# Patient Record
Sex: MALE | Race: WHITE | Employment: OTHER | URBAN - METROPOLITAN AREA
[De-identification: names, ages, dates, MRNs, and addresses within clinical notes are randomized per-mention and may not be internally consistent; named-entity substitution may affect disease eponyms.]

---

## 2017-03-08 ENCOUNTER — OFFICE VISIT (OUTPATIENT)
Dept: FAMILY MEDICINE | Facility: CLINIC | Age: 67
End: 2017-03-08
Payer: COMMERCIAL

## 2017-03-08 ENCOUNTER — TRANSFERRED RECORDS (OUTPATIENT)
Dept: HEALTH INFORMATION MANAGEMENT | Facility: CLINIC | Age: 67
End: 2017-03-08

## 2017-03-08 VITALS
BODY MASS INDEX: 28.01 KG/M2 | DIASTOLIC BLOOD PRESSURE: 70 MMHG | SYSTOLIC BLOOD PRESSURE: 124 MMHG | WEIGHT: 189.1 LBS | TEMPERATURE: 98.4 F | HEART RATE: 76 BPM | HEIGHT: 69 IN

## 2017-03-08 DIAGNOSIS — L08.9 RIGHT KNEE SKIN INFECTION: Primary | ICD-10-CM

## 2017-03-08 DIAGNOSIS — Z96.651 STATUS POST TOTAL RIGHT KNEE REPLACEMENT: ICD-10-CM

## 2017-03-08 DIAGNOSIS — D50.9 IRON DEFICIENCY ANEMIA, UNSPECIFIED IRON DEFICIENCY ANEMIA TYPE: ICD-10-CM

## 2017-03-08 LAB
BASOPHILS # BLD AUTO: 0 10E9/L (ref 0–0.2)
BASOPHILS NFR BLD AUTO: 0.2 %
DIFFERENTIAL METHOD BLD: ABNORMAL
EOSINOPHIL # BLD AUTO: 0.1 10E9/L (ref 0–0.7)
EOSINOPHIL NFR BLD AUTO: 0.8 %
ERYTHROCYTE [DISTWIDTH] IN BLOOD BY AUTOMATED COUNT: 14.2 % (ref 10–15)
ERYTHROCYTE [SEDIMENTATION RATE] IN BLOOD BY WESTERGREN METHOD: 56 MM/H (ref 0–20)
HCT VFR BLD AUTO: 33.3 % (ref 40–53)
HGB BLD-MCNC: 10.6 G/DL (ref 13.3–17.7)
IMM GRANULOCYTES # BLD: 0 10E9/L (ref 0–0.4)
IMM GRANULOCYTES NFR BLD: 0.1 %
LYMPHOCYTES # BLD AUTO: 1.9 10E9/L (ref 0.8–5.3)
LYMPHOCYTES NFR BLD AUTO: 18.4 %
MCH RBC QN AUTO: 29.4 PG (ref 26.5–33)
MCHC RBC AUTO-ENTMCNC: 31.8 G/DL (ref 31.5–36.5)
MCV RBC AUTO: 92 FL (ref 78–100)
MONOCYTES # BLD AUTO: 1.4 10E9/L (ref 0–1.3)
MONOCYTES NFR BLD AUTO: 14 %
NEUTROPHILS # BLD AUTO: 6.8 10E9/L (ref 1.6–8.3)
NEUTROPHILS NFR BLD AUTO: 66.5 %
PLATELET # BLD AUTO: 337 10E9/L (ref 150–450)
RBC # BLD AUTO: 3.61 10E12/L (ref 4.4–5.9)
WBC # BLD AUTO: 10.2 10E9/L (ref 4–11)

## 2017-03-08 PROCEDURE — 99214 OFFICE O/P EST MOD 30 MIN: CPT | Performed by: PHYSICIAN ASSISTANT

## 2017-03-08 PROCEDURE — 85652 RBC SED RATE AUTOMATED: CPT | Performed by: PHYSICIAN ASSISTANT

## 2017-03-08 PROCEDURE — 85025 COMPLETE CBC W/AUTO DIFF WBC: CPT | Performed by: PHYSICIAN ASSISTANT

## 2017-03-08 PROCEDURE — 36415 COLL VENOUS BLD VENIPUNCTURE: CPT | Performed by: PHYSICIAN ASSISTANT

## 2017-03-08 RX ORDER — AMOXICILLIN 250 MG
1 CAPSULE ORAL
COMMUNITY
Start: 2017-02-09 | End: 2018-03-27

## 2017-03-08 RX ORDER — HYDROXYZINE PAMOATE 25 MG/1
25 CAPSULE ORAL
COMMUNITY
Start: 2017-01-11 | End: 2019-07-11

## 2017-03-08 ASSESSMENT — ANXIETY QUESTIONNAIRES
3. WORRYING TOO MUCH ABOUT DIFFERENT THINGS: NOT AT ALL
5. BEING SO RESTLESS THAT IT IS HARD TO SIT STILL: NOT AT ALL
GAD7 TOTAL SCORE: 0
2. NOT BEING ABLE TO STOP OR CONTROL WORRYING: NOT AT ALL
7. FEELING AFRAID AS IF SOMETHING AWFUL MIGHT HAPPEN: NOT AT ALL
6. BECOMING EASILY ANNOYED OR IRRITABLE: NOT AT ALL
1. FEELING NERVOUS, ANXIOUS, OR ON EDGE: NOT AT ALL

## 2017-03-08 ASSESSMENT — PATIENT HEALTH QUESTIONNAIRE - PHQ9: 5. POOR APPETITE OR OVEREATING: NOT AT ALL

## 2017-03-08 NOTE — MR AVS SNAPSHOT
"              After Visit Summary   3/8/2017    Juan Lindsay    MRN: [id number]           Patient Information     Date Of Birth          1950        Visit Information        Provider Department      3/8/2017 11:40 AM Nguyen Lincoln PA-C [facility]        Today's Diagnoses     Right knee skin infection    -  1    Status post total right knee replacement           Follow-ups after your visit        Who to contact     Normal or non-critical lab and imaging results will be communicated to you by [email or web address], letter or phone within 4 business days after the clinic has received the results. If you do not hear from us within 7 days, please contact the clinic through [email or web address] or phone. If you have a critical or abnormal lab result, we will notify you by phone as soon as possible.  Submit refill requests through Consumr or call your pharmacy and they will forward the refill request to us. Please allow 3 business days for your refill to be completed.          If you need to speak with a  for additional information , please call: [phone]             Additional Information About Your Visit        [email or web address] Information     Consumr gives you secure access to your electronic health record. If you see a primary care provider, you can also send messages to your care team and make appointments. If you have questions, please call your primary care clinic.  If you do not have a primary care provider, please call [phone] and they will assist you.        Care EveryWhere ID     This is your Care EveryWhere ID. This could be used by other organizations to access your [facility] medical records  [id number]        Your Vitals Were     Pulse Temperature Height BMI (Body Mass Index)          76 98.4  F (36.9  C) (Tympanic) 5' 9.25\" (1.759 m) 27.72 kg/m2         Blood Pressure from Last 3 Encounters:   03/08/17 124/70   10/20/16 138/82   07/27/16 120/68    Weight from Last 3 Encounters:   03/08/17 189 lb " 1.6 oz (85.8 kg)   10/20/16 184 lb (83.5 kg)   07/27/16 197 lb 8 oz (89.6 kg)              We Performed the Following     CBC with platelets differential     Erythrocyte sedimentation rate auto          Today's Medication Changes          These changes are accurate as of: 3/8/17 11:59 PM.  If you have any questions, ask your nurse or doctor.               These medicines have changed or have updated prescriptions.        Dose/Directions    escitalopram 20 MG tablet   Commonly known as:  LEXAPRO   This may have changed:  how much to take   Used for:  Anxiety state        Dose:  20 mg   Take 1 tablet (20 mg) by mouth daily   Quantity:  30 tablet   Refills:  0                Primary Care Provider Office Phone # Fax #    Radha Vivar [phone]       [facility] [address] [facility] [address]        Thank you!     Thank you for choosing [facility]  for your care. Our goal is always to provide you with excellent care. Hearing back from our patients is one way we can continue to improve our services. Please take a few minutes to complete the written survey that you may receive in the mail after your visit with us. Thank you!             Your Updated Medication List - Protect others around you: Learn how to safely use, store and throw away your medicines at www.disposemymeds.org.          This list is accurate as of: 3/8/17 11:59 PM.  Always use your most recent med list.                   Brand Name Dispense Instructions for use    escitalopram 20 MG tablet    LEXAPRO    30 tablet    Take 1 tablet (20 mg) by mouth daily       fluticasone 50 MCG/ACT spray    FLONASE    3 Bottle    Spray 1-2 sprays into both nostrils daily       gabapentin 400 MG capsule    NEURONTIN     Take 1 capsule by mouth 3 times daily       hydrOXYzine 25 MG capsule    VISTARIL     Take 25 mg by mouth       MS CONTIN 15 MG 12 hr tablet   Generic drug:  morphine      Take 2 tablets (30 mg) by mouth 4  times daily       NORCO  MG per tablet   Generic drug:  HYDROcodone-acetaminophen      Take 2 tablets by mouth 3 times daily.       SENIOR MENS FORMULA OR      Take 1 tablet by mouth daily.       senna-docusate 8.6-50 MG per tablet    SENOKOT-S;PERICOLACE     Take 1 tablet by mouth       VITAMIN B12 PO      Take 1 tablet by mouth daily

## 2017-03-08 NOTE — NURSING NOTE
"Chief Complaint   Patient presents with     Knee Pain       Initial /75 (BP Location: Right arm, Patient Position: Chair, Cuff Size: Adult Regular)  Pulse 76  Temp 98.4  F (36.9  C) (Tympanic)  Ht 5' 9.25\" (1.759 m)  Wt 189 lb 1.6 oz (85.8 kg)  BMI 27.72 kg/m2 Estimated body mass index is 27.72 kg/(m^2) as calculated from the following:    Height as of this encounter: 5' 9.25\" (1.759 m).    Weight as of this encounter: 189 lb 1.6 oz (85.8 kg).  Medication Reconciliation: complete   Kathy Mcgill CMA  "

## 2017-03-08 NOTE — LETTER
[facility]  [address] [place] [id number]  [phone]        February 26, 2018    Juan Lindsay  [id number] [facility] [address]              Dear Juan Lindsay    This is to remind you that your non fasting lab is due.    You may call our office at [phone] to schedule an appointment.    Please disregard this notice if you have already had your labs drawn or made an appointment.        Sincerely,        Nguyen Lincoln PA-C

## 2017-03-09 ASSESSMENT — ANXIETY QUESTIONNAIRES: GAD7 TOTAL SCORE: 0

## 2017-03-09 ASSESSMENT — PATIENT HEALTH QUESTIONNAIRE - PHQ9: SUM OF ALL RESPONSES TO PHQ QUESTIONS 1-9: 1

## 2017-03-11 ENCOUNTER — TRANSFERRED RECORDS (OUTPATIENT)
Dept: HEALTH INFORMATION MANAGEMENT | Facility: CLINIC | Age: 67
End: 2017-03-11

## 2017-03-15 ENCOUNTER — TELEPHONE (OUTPATIENT)
Dept: FAMILY MEDICINE | Facility: CLINIC | Age: 67
End: 2017-03-15

## 2017-03-15 NOTE — TELEPHONE ENCOUNTER
DIANA Mendoza: He sees the surgeon on 3/ Abram said he has not been taking extra iron the past 2 months.  He said that we will start taking it again. He said that he saw the Orthopedist and was admitted to the hospital and on  3/16/17 they cleaned it all out. The infection went into my system and so I have PICC line and I have to self give IV antibiotic- cefazolin 3 times a day. Also taking rifampin. Feeling better now and the PICC line is going well. He sees the surgeon on 3/17/17. He said that the homecare nurse visits too to check on the PICC line.  Saúl Burch RN

## 2017-03-15 NOTE — TELEPHONE ENCOUNTER
Patient Result Comments   Entered by Nguyen Lincoln PA-C at 3/10/2017  6:38 PM   Red Valverde,   Your hemoglobin is low. Are you still taking iron?   How did the ortho appointment go? Did you have a joint infection?   Kelly Lincoln PA-C

## 2017-03-15 NOTE — TELEPHONE ENCOUNTER
Message left on answering machine to call the [facility] RN back.  Saúl Burch RN

## 2017-03-17 ENCOUNTER — TRANSFERRED RECORDS (OUTPATIENT)
Dept: HEALTH INFORMATION MANAGEMENT | Facility: CLINIC | Age: 67
End: 2017-03-17

## 2017-03-22 ENCOUNTER — MEDICAL CORRESPONDENCE (OUTPATIENT)
Dept: HEALTH INFORMATION MANAGEMENT | Facility: CLINIC | Age: 67
End: 2017-03-22

## 2017-04-06 ENCOUNTER — TRANSFERRED RECORDS (OUTPATIENT)
Dept: HEALTH INFORMATION MANAGEMENT | Facility: CLINIC | Age: 67
End: 2017-04-06

## 2017-04-07 ENCOUNTER — TELEPHONE (OUTPATIENT)
Dept: FAMILY MEDICINE | Facility: CLINIC | Age: 67
End: 2017-04-07

## 2017-04-07 NOTE — TELEPHONE ENCOUNTER
Nano from [facility] calling to state that they are discharging Juan.  They were seeing him for infusion through his pic line. [facility] had contacted [facility] as they thought they would be helping him with his future infusions, but they will not be.  She is wondering what the options are for his future treatments?  They needed to discharge him as he is no longer home bound.  Please review and advise.  Thank you..Toya Hoyos

## 2017-04-07 NOTE — TELEPHONE ENCOUNTER
He has never followed up in this office and we are not managing this antibiotic treatment. I think his surgeon has been so I don't know what they are planning on doing. Maybe they should be asked. Radha Vivar M.D.

## 2017-04-07 NOTE — TELEPHONE ENCOUNTER
I jus tfound a consult from Dr. Tramaine fair in [place] about him he needs ivabx until 4/18. He will need to come in for the infusions somewhere I guess. Radha Vivar M.D.

## 2017-04-10 NOTE — TELEPHONE ENCOUNTER
So, do I tell Abram to contact Dr. Redd for the orders and have Abram call the Infusion Clinic?  Saúl Burch RN

## 2017-04-11 NOTE — TELEPHONE ENCOUNTER
I think Dr. Redd has been ordering the iv antibiotics he also need s labs that have not been being done. It is either his offic or orthopedics . I have not been doing anything. Radha Vivar M.D.;

## 2017-04-12 NOTE — TELEPHONE ENCOUNTER
Called patient and told him Dr. Vivar reccomends contacting Dr. Redd. Patient then stated that he saw Tramaine last week and gave him instructions to stop the infusion on 4-18 and then start oral antibiotic medications but patient's current  home care is discharging him from IV therapy as he is not home bound now. Patient then states that he only needs the PICC re-taped and then removed on 4-18 as patient is doing his own infusions 3 times per day. Patient was told by the [facility] to seek a [facility] to have this last step done. Told the patient this nurse would contact [facility] and get information as to how to proceed. Contacted Radha at [facility] who informs this nurse that due to insurance the patient will need to have this done at an infusion clinic as not able to be done in the home. Radha gave this nurse 2 contacts: [phone] in [place] or [phone] in [place]. Called patient back and patient informed this nurse that [facility] put in the PICC and he could contact their clinic to see if he can have the last steps finished through [facility]. Told patient that this seems like the logical route. Asked patient to call the clinic back to follow up that this taken care of or if patient wishes to switch back to [facility], can go from there.  Dot

## 2017-04-19 ENCOUNTER — MEDICAL CORRESPONDENCE (OUTPATIENT)
Dept: HEALTH INFORMATION MANAGEMENT | Facility: CLINIC | Age: 67
End: 2017-04-19

## 2017-04-26 ENCOUNTER — OFFICE VISIT (OUTPATIENT)
Dept: FAMILY MEDICINE | Facility: CLINIC | Age: 67
End: 2017-04-26
Payer: COMMERCIAL

## 2017-04-26 VITALS
WEIGHT: 174.2 LBS | HEIGHT: 69 IN | DIASTOLIC BLOOD PRESSURE: 74 MMHG | BODY MASS INDEX: 25.8 KG/M2 | HEART RATE: 72 BPM | SYSTOLIC BLOOD PRESSURE: 132 MMHG | TEMPERATURE: 97.7 F

## 2017-04-26 DIAGNOSIS — M25.50 MULTIPLE JOINT PAIN: ICD-10-CM
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
## 2017-04-26 DIAGNOSIS — Z96.659 INFECTION OF PROSTHETIC KNEE JOINT, SEQUELA: ICD-10-CM

## 2017-04-26 DIAGNOSIS — T84.59XS INFECTION OF PROSTHETIC KNEE JOINT, SEQUELA: ICD-10-CM

## 2017-04-26 PROCEDURE — G0180 MD CERTIFICATION HHA PATIENT: HCPCS | Performed by: FAMILY MEDICINE

## 2017-04-26 PROCEDURE — 99214 OFFICE O/P EST MOD 30 MIN: CPT | Performed by: PHYSICIAN ASSISTANT

## 2017-04-26 RX ORDER — DOXYCYCLINE 100 MG/1
200 CAPSULE ORAL ONCE
Qty: 2 CAPSULE | Refills: 0 | Status: SHIPPED | OUTPATIENT
Start: 2017-04-26 | End: 2017-04-26

## 2017-04-26 RX ORDER — GABAPENTIN 600 MG/1
600 TABLET ORAL 3 TIMES DAILY
Status: ON HOLD | COMMUNITY
Start: 2017-04-11 | End: 2020-07-19

## 2017-04-26 RX ORDER — DIAZEPAM 5 MG
5 TABLET ORAL PRN
COMMUNITY
Start: 2017-04-24 | End: 2019-09-03

## 2017-04-26 RX ORDER — IBUPROFEN 600 MG/1
600 TABLET, FILM COATED ORAL 2 TIMES DAILY
Status: ON HOLD | COMMUNITY
Start: 2017-03-20 | End: 2020-07-19

## 2017-04-26 RX ORDER — ZOLPIDEM TARTRATE 10 MG/1
10 TABLET ORAL
COMMUNITY
Start: 2017-04-05

## 2017-04-26 NOTE — PROGRESS NOTES
"  SUBJECTIVE:                                                    Juan Lindsay is a 66 year old male who presents to clinic today for the following health issues:      *Having inflamed joints in shoulders and right foot. Wondering if these are side effects from the antibiotics he is on or from getting bit by a deer tick. Noticed the tick on Monday. He did have a bullseye.  **Making sure that he doesn't have lyme's.    He pulled tick off 2 days ago. Wasn't engorged. Was a deer tick he thinks  Had 1 inch bullseye right around tick he thinks    He's not sure when he got the tick, [place]  \"I've always had some\" joint pain from suspected lymes in 1998 - was treated \"permanent damage\"  - bilateral shoulders, right foot  Last few days right foot, left shoulder, left knee  On chronic pain meds for low back, knee    No rashes  Eating/drinking okay  No other new symptoms   Fevers/chills - no    Total knee right side 11/2016. Then treated for joint infection 3/2017  Had surgery to \"clean out knee\" after last visit, kept him in the hospital for sepsis  He is not taking iron tablets due to levaquin  He is currently on Rifampin - he thinks for 50 days  1 week ago started levaquin. Will be on this he thinks 4 months  Was getting infusions, took out picc line out 1 week ago, site healed fine  He is going to see infectious disease around May 18  He sees surgeon in 2 days    His ortho/ID records are in care everywhere  ID:  Kirk Redd MD - 04/06/2017 10:20 AM CDT  Formatting of this note may be different from the original.  Assessment:  1. R TKA done 11/8/16  2. Fell on R knee one month ago. Knee became acutely swollen over past 4 days:  Infected R TKA s/p I&D, liner exchange 3/8. Culture with mssa.  Tolerating Rx well. CRP is declining.  Recommendations:  1. 6 weeks of IV cefazolin + Rifampin 600 mg/day planned from 3/8- 4/18.  Followed by 18 weeks of Levaquin 750 mg/day + Rifampin 600 mg/day starting "   2. Monitor CBC, CRP, creatinine weekly on IV antibiotics (this has not been done yet!  4. RTC with me in 4 weeks.      Problem list and histories reviewed & adjusted, as indicated.  Additional history: as documented    Patient Active Problem List   Diagnosis     Sleep apnea     ALCOH DEP not active for 20 years.     Tobacco use disorder     Allergic rhinitis due to other allergen     PAIN BACK, LOW     Anxiety state     Sedative, hypnotic or anxiolytic abuse, continuous     Moderate major depression (H)     Osteopenia     Health Care Home     Obstructive sleep apnea     Iron deficiency anemia     Anemia     Advanced directives, counseling/discussion     Long QT interval     Aftercare following joint replacement surgery     Infection of prosthetic knee joint (H)     Myofascial pain     Arthralgia of shoulder     Encounter for other administrative examinations     Prepatellar bursitis     Degeneration of intervertebral disc of lumbosacral region     Past Surgical History:   Procedure Laterality Date     COLONOSCOPY  10/8/1998    Colonoscopy     SURGICAL HISTORY OF -   1999    Uvulopalatopharyngoplasty with Tonsillecotomy     SURGICAL HISTORY OF -   2003    Septoplasty     SURGICAL HISTORY OF -       L Elbow     SURGICAL HISTORY OF -   2004     L Knee Arthroscopy     SURGICAL HISTORY OF -   10/2004    L Rotator Cuff Repair     SURGICAL HISTORY OF -       Ruptured appendix       Social History   Substance Use Topics     Smoking status: Light Tobacco Smoker     Packs/day: 0.50     Years: 25.00     Types: Cigarettes     Last attempt to quit: 2014     Smokeless tobacco: Never Used     Alcohol use No     Family History   Problem Relation Age of Onset     Lipids Mother      Alzheimer Disease Mother            Anemia Mother      Questionable     Lipids Father      CEREBROVASCULAR DISEASE Father      CANCER Father      Neurologic Disorder Brother      atacksia     Substance Abuse Brother   "    Alcohol/Drug Brother      Alcohal and Rx abuse. Suicide 2007     Psychotic Disorder Brother      Thyroid Disease Brother            Reviewed and updated as needed this visit by clinical staff  Tobacco  Allergies  Meds  Problems  Med Hx  Surg Hx  Fam Hx  Soc Hx        Reviewed and updated as needed this visit by Provider  Tobacco  Allergies  Meds  Problems  Med Hx  Surg Hx  Fam Hx  Soc Hx          ROS:  Other than noted above, general, HEENT, respiratory, cardiac, MS, and gastrointestinal systems are negative.     OBJECTIVE:                                                    /74 (BP Location: Right arm, Patient Position: Chair, Cuff Size: Adult Large)  Pulse 72  Temp 97.7  F (36.5  C) (Tympanic)  Ht 5' 9.25\" (1.759 m)  Wt 174 lb 3.2 oz (79 kg)  BMI 25.54 kg/m2  Body mass index is 25.54 kg/(m^2).  GENERAL: healthy, alert and no distress  EYES: Eyes grossly normal to inspection, PERRL and conjunctivae and sclerae normal  HENT: ear canals and TM's normal, nose and mouth without ulcers or lesions  NECK: no adenopathy, no asymmetry, masses, or scars and thyroid normal to palpation  RESP: lungs clear to auscultation - no rales, rhonchi or wheezes  CV: regular rate and rhythm, normal S1 S2, no S3 or S4, no murmur, click or rub, no peripheral edema and peripheral pulses strong  ABDOMEN: soft, nontender, no hepatosplenomegaly, no masses and bowel sounds normal  MS: no gross musculoskeletal defects noted, no edema  Shoulders full ROM  Bilateral knees show large scars from knee replacements  No tenderness to palpate hands/fingers  Foot/ankle full ROM. No tenderness to palpation. He states pain is at top of foot  NEURO: Normal strength and tone, mentation intact and speech normal  BACK: no CVA tenderness, no paralumbar tenderness    SKIN: POSITIVE Left lower inner thigh - small <1cm scab no bullseye or spreading redness     ASSESSMENT/PLAN:                                                  "     ASSESSMENT/PLAN:      ICD-10-CM    1. Tick bite, initial encounter W57.XXXA doxycycline Monohydrate 100 MG CAPS   2. Infection of prosthetic knee joint, sequela T84.59XS     Z96.659    3. Multiple joint pain M25.50      Discussed unlikely for Lymes transmission given amount of time with tick contact, not really enough time for joint pain from tick bite. discussed symptoms and treatment. Patient has chronic joint/muscle pain, is on chronic narcotics, and currently being treated with 2 antibiotics for joint infection. He is followed by infectious disease.  To be seen if bullseye rash, fevers, chills, joint pains, body aches, feeling ill.     Patient Instructions   Take 1 dose of doxycycline to prevent Lymes  Follow up with infectious disease for this    Nguyen Lincoln PA-C  [facility]

## 2017-04-26 NOTE — NURSING NOTE
"Chief Complaint   Patient presents with     other     thinks he might have lymes       Initial There were no vitals taken for this visit. Estimated body mass index is 27.72 kg/(m^2) as calculated from the following:    Height as of 3/8/17: 5' 9.25\" (1.759 m).    Weight as of 3/8/17: 189 lb 1.6 oz (85.8 kg).  Medication Reconciliation: complete   Chetna Pulliam CMA    "

## 2017-04-26 NOTE — LETTER
My Depression Action Plan  Name: Juan Lindsay   Date of Birth 1950  Date: 4/26/2017    My doctor: Radha Vivar   My clinic: [address] [address] [phone]          GREEN    ZONE   Good Control    What it looks like:     Things are going generally well. You have normal up s and down s. You may even feel depressed from time to time, but bad moods usually last less than a day.   What you need to do:  1. Continue to care for yourself (see self care plan)  2. Check your depression survival kit and update it as needed  3. Follow your physician s recommendations including any medication.  4. Do not stop taking medication unless you consult with your physician first.           YELLOW         ZONE Getting Worse    What it looks like:     Depression is starting to interfere with your life.     It may be hard to get out of bed; you may be starting to isolate yourself from others.    Symptoms of depression are starting to last most all day and this has happened for several days.     You may have suicidal thoughts but they are not constant.   What you need to do:     1. Call your care team, your response to treatment will improve if you keep your care team informed of your progress. Yellow periods are signs an adjustment may need to be made.     2. Continue your self-care, even if you have to fake it!    3. Talk to someone in your support network    4. Open up your depression survival kit           RED    ZONE Medical Alert - Get Help    What it looks like:     Depression is seriously interfering with your life.     You may experience these or other symptoms: You can t get out of bed most days, can t work or engage in other necessary activities, you have trouble taking care of basic hygiene, or basic responsibilities, thoughts of suicide or death that will not go away, self-injurious behavior.     What you need to do:  1. Call your care team and request a  same-day appointment. If they are not available (weekends or after hours) call your local crisis line, emergency room or 911.      Electronically signed by: Chetna Pulliam, April 26, 2017    Depression Self Care Plan / Survival Kit    Self-Care for Depression  Here s the deal. Your body and mind are really not as separate as most people think.  What you do and think affects how you feel and how you feel influences what you do and think. This means if you do things that people who feel good do, it will help you feel better.  Sometimes this is all it takes.  There is also a place for medication and therapy depending on how severe your depression is, so be sure to consult with your medical provider and/ or Behavioral Health Consultant if your symptoms are worsening or not improving.     In order to better manage my stress, I will:    Exercise  Get some form of exercise, every day. This will help reduce pain and release endorphins, the  feel good  chemicals in your brain. This is almost as good as taking antidepressants!  This is not the same as joining a gym and then never going! (they count on that by the way ) It can be as simple as just going for a walk or doing some gardening, anything that will get you moving.      Hygiene   Maintain good hygiene (Get out of bed in the morning, Make your bed, Brush your teeth, Take a shower, and Get dressed like you were going to work, even if you are unemployed).  If your clothes don't fit try to get ones that do.    Diet  I will strive to eat foods that are good for me, drink plenty of water, and avoid excessive sugar, caffeine, alcohol, and other mood-altering substances.  Some foods that are helpful in depression are: complex carbohydrates, B vitamins, flaxseed, fish or fish oil, fresh fruits and vegetables.    Psychotherapy  I agree to participate in Individual Therapy (if recommended).    Medication  If prescribed medications, I agree to take them.  Missing doses can result in  serious side effects.  I understand that drinking alcohol, or other illicit drug use, may cause potential side effects.  I will not stop my medication abruptly without first discussing it with my provider.    Staying Connected With Others  I will stay in touch with my friends, family members, and my primary care provider/team.    Use your imagination  Be creative.  We all have a creative side; it doesn t matter if it s oil painting, sand castles, or mud pies! This will also kick up the endorphins.    Witness Beauty  (AKA stop and smell the roses) Take a look outside, even in mid-winter. Notice colors, textures. Watch the squirrels and birds.     Service to others  Be of service to others.  There is always someone else in need.  By helping others we can  get out of ourselves  and remember the really important things.  This also provides opportunities for practicing all the other parts of the program.    Humor  Laugh and be silly!  Adjust your TV habits for less news and crime-drama and more comedy.    Control your stress  Try breathing deep, massage therapy, biofeedback, and meditation. Find time to relax each day.     My support system    Clinic Contact:  Phone number:    Contact 1:  Phone number:    Contact 2:  Phone number:    Confucianism/:  Phone number:    Therapist:  Phone number:    Local crisis center:    Phone number:    Other community support:  Phone number:

## 2017-04-26 NOTE — MR AVS SNAPSHOT
"              After Visit Summary   4/26/2017    Juan Lindsay    MRN: [id number]           Patient Information     Date Of Birth          1950        Visit Information        Provider Department      4/26/2017 11:20 AM Nguyen Lincoln PA-C [facility]        Today's Diagnoses     Tick bite, initial encounter    -  1      Care Instructions    Take 1 dose of doxycycline to prevent Lymes  Follow up with infectious disease for this        Follow-ups after your visit        Who to contact     Normal or non-critical lab and imaging results will be communicated to you by [employer], letter or phone within 4 business days after the clinic has received the results. If you do not hear from us within 7 days, please contact the clinic through [employer] or phone. If you have a critical or abnormal lab result, we will notify you by phone as soon as possible.  Submit refill requests through [employer] or call your pharmacy and they will forward the refill request to us. Please allow 3 business days for your refill to be completed.          If you need to speak with a  for additional information , please call: [phone]             Additional Information About Your Visit        MyChart Information     [employer] gives you secure access to your electronic health record. If you see a primary care provider, you can also send messages to your care team and make appointments. If you have questions, please call your primary care clinic.  If you do not have a primary care provider, please call [phone] and they will assist you.        Care EveryWhere ID     This is your Care EveryWhere ID. This could be used by other organizations to access your [place] medical records  [id number]        Your Vitals Were     Pulse Temperature Height BMI (Body Mass Index)          72 97.7  F (36.5  C) (Tympanic) 5' 9.25\" (1.759 m) 25.54 kg/m2         Blood Pressure from Last 3 Encounters:   04/26/17 132/74   03/08/17 " 124/70   10/20/16 138/82    Weight from Last 3 Encounters:   04/26/17 174 lb 3.2 oz (79 kg)   03/08/17 189 lb 1.6 oz (85.8 kg)   10/20/16 184 lb (83.5 kg)              We Performed the Following     DEPRESSION ACTION PLAN (DAP)          Today's Medication Changes          These changes are accurate as of: 4/26/17 12:34 PM.  If you have any questions, ask your nurse or doctor.               Start taking these medicines.        Dose/Directions    doxycycline Monohydrate 100 MG Caps   Used for:  Tick bite, initial encounter   Started by:  Nguyen Lincoln PA-C        Dose:  200 mg   Take 2 capsules (200 mg) by mouth once for 1 dose   Quantity:  2 capsule   Refills:  0         These medicines have changed or have updated prescriptions.        Dose/Directions    escitalopram 20 MG tablet   Commonly known as:  LEXAPRO   This may have changed:  how much to take   Used for:  Anxiety state        Dose:  20 mg   Take 1 tablet (20 mg) by mouth daily   Quantity:  30 tablet   Refills:  0            Where to get your medicines      These medications were sent to [facility] - [address]  [address], [place] [address]    Hours:  SEL[facility] Phone:  [phone]     doxycycline Monohydrate 100 MG Caps                Primary Care Provider Office Phone # Fax #    Radha Vivar [phone]       [facility] [id number] [place] [address]        Thank you!     Thank you for choosing [facility]  for your care. Our goal is always to provide you with excellent care. Hearing back from our patients is one way we can continue to improve our services. Please take a few minutes to complete the written survey that you may receive in the mail after your visit with us. Thank you!             Your Updated Medication List - Protect others around you: Learn how to safely use, store and throw away your medicines at  www.disposemymeds.org.          This list is accurate as of: 4/26/17 12:34 PM.  Always use your most recent med list.                   Brand Name Dispense Instructions for use    aspirin  MG EC tablet      Take 325 mg by mouth       diazepam 5 MG tablet    VALIUM     Take 5 mg by mouth       DILAUDID PO      Take by mouth every 4 hours as needed for moderate to severe pain       doxycycline Monohydrate 100 MG Caps     2 capsule    Take 2 capsules (200 mg) by mouth once for 1 dose       escitalopram 20 MG tablet    LEXAPRO    30 tablet    Take 1 tablet (20 mg) by mouth daily       fluticasone 50 MCG/ACT spray    FLONASE    3 Bottle    Spray 1-2 sprays into both nostrils daily       * gabapentin 400 MG capsule    NEURONTIN     Take 1 capsule by mouth 3 times daily       * gabapentin 600 MG tablet    NEURONTIN     Take 600 mg by mouth 3 times daily       hydrOXYzine 25 MG capsule    VISTARIL     Take 25 mg by mouth       ibuprofen 600 MG tablet    ADVIL/MOTRIN     Take 600 mg by mouth 2 times daily       LEVOFLOXACIN PO      Take 750 mg by mouth daily       MS CONTIN 15 MG 12 hr tablet   Generic drug:  morphine      Take 2 tablets (30 mg) by mouth 4 times daily       NORCO  MG per tablet   Generic drug:  HYDROcodone-acetaminophen      Take 2 tablets by mouth 3 times daily.       RIFAMPIN PO      Take 300 mg by mouth daily       SENIOR MENS FORMULA OR      Take 1 tablet by mouth daily Reported on 4/26/2017       senna-docusate 8.6-50 MG per tablet    SENOKOT-S;PERICOLACE     Take 1 tablet by mouth       VITAMIN B12 PO      Take 1 tablet by mouth daily Reported on 4/26/2017       zolpidem 10 MG tablet    AMBIEN     Take 10 mg by mouth       * Notice:  This list has 2 medication(s) that are the same as other medications prescribed for you. Read the directions carefully, and ask your doctor or other care provider to review them with you.

## 2017-04-29 PROBLEM — Z47.1 AFTERCARE FOLLOWING JOINT REPLACEMENT SURGERY: Status: ACTIVE | Noted: 2017-03-17

## 2017-04-29 PROBLEM — M70.40 PREPATELLAR BURSITIS: Status: ACTIVE | Noted: 2017-03-08

## 2017-04-29 PROBLEM — Z96.659 INFECTION OF PROSTHETIC KNEE JOINT (H): Status: ACTIVE | Noted: 2017-04-29

## 2017-04-29 PROBLEM — T84.59XA INFECTION OF PROSTHETIC KNEE JOINT (H): Status: ACTIVE | Noted: 2017-04-29

## 2017-07-05 ENCOUNTER — COMMUNICATION - HEALTHEAST (OUTPATIENT)
Dept: INFECTIOUS DISEASES | Facility: CLINIC | Age: 67
End: 2017-07-05

## 2017-07-05 DIAGNOSIS — Z96.659 INFECTED PROSTHETIC KNEE JOINT, SUBSEQUENT ENCOUNTER: ICD-10-CM

## 2017-07-05 DIAGNOSIS — T84.59XD INFECTED PROSTHETIC KNEE JOINT, SUBSEQUENT ENCOUNTER: ICD-10-CM

## 2017-07-10 ENCOUNTER — COMMUNICATION - HEALTHEAST (OUTPATIENT)
Dept: INFECTIOUS DISEASES | Facility: CLINIC | Age: 67
End: 2017-07-10

## 2017-07-19 ENCOUNTER — COMMUNICATION - HEALTHEAST (OUTPATIENT)
Dept: INFECTIOUS DISEASES | Facility: CLINIC | Age: 67
End: 2017-07-19

## 2017-07-24 ENCOUNTER — COMMUNICATION - HEALTHEAST (OUTPATIENT)
Dept: INFECTIOUS DISEASES | Facility: CLINIC | Age: 67
End: 2017-07-24

## 2017-07-24 DIAGNOSIS — T84.59XS INFECTED PROSTHETIC KNEE JOINT, SEQUELA: ICD-10-CM

## 2017-07-24 DIAGNOSIS — Z96.659 INFECTED PROSTHETIC KNEE JOINT, SEQUELA: ICD-10-CM

## 2017-08-02 ENCOUNTER — COMMUNICATION - HEALTHEAST (OUTPATIENT)
Dept: INFECTIOUS DISEASES | Facility: CLINIC | Age: 67
End: 2017-08-02

## 2017-08-02 DIAGNOSIS — Z96.659 INFECTED PROSTHETIC KNEE JOINT, SUBSEQUENT ENCOUNTER: ICD-10-CM

## 2017-08-02 DIAGNOSIS — T84.59XD INFECTED PROSTHETIC KNEE JOINT, SUBSEQUENT ENCOUNTER: ICD-10-CM

## 2017-09-05 ENCOUNTER — COMMUNICATION - HEALTHEAST (OUTPATIENT)
Dept: INFECTIOUS DISEASES | Facility: CLINIC | Age: 67
End: 2017-09-05

## 2017-09-05 DIAGNOSIS — Z96.659 INFECTED PROSTHETIC KNEE JOINT, SUBSEQUENT ENCOUNTER: ICD-10-CM

## 2017-09-05 DIAGNOSIS — T84.59XD INFECTED PROSTHETIC KNEE JOINT, SUBSEQUENT ENCOUNTER: ICD-10-CM

## 2017-09-28 ENCOUNTER — TRANSFERRED RECORDS (OUTPATIENT)
Dept: HEALTH INFORMATION MANAGEMENT | Facility: CLINIC | Age: 67
End: 2017-09-28

## 2018-03-27 ENCOUNTER — OFFICE VISIT (OUTPATIENT)
Dept: FAMILY MEDICINE | Facility: CLINIC | Age: 68
End: 2018-03-27
Payer: COMMERCIAL

## 2018-03-27 VITALS
DIASTOLIC BLOOD PRESSURE: 84 MMHG | BODY MASS INDEX: 25.92 KG/M2 | HEART RATE: 64 BPM | OXYGEN SATURATION: 97 % | TEMPERATURE: 98.8 F | HEIGHT: 69 IN | WEIGHT: 175 LBS | SYSTOLIC BLOOD PRESSURE: 139 MMHG

## 2018-03-27 DIAGNOSIS — F17.200 TOBACCO USE DISORDER: ICD-10-CM

## 2018-03-27 DIAGNOSIS — T84.59XS INFECTION OF PROSTHETIC KNEE JOINT, SEQUELA: ICD-10-CM

## 2018-03-27 DIAGNOSIS — Z13.6 ENCOUNTER FOR ABDOMINAL AORTIC ANEURYSM SCREENING: ICD-10-CM

## 2018-03-27 DIAGNOSIS — J01.01 ACUTE RECURRENT MAXILLARY SINUSITIS: Primary | ICD-10-CM

## 2018-03-27 DIAGNOSIS — Z11.59 NEED FOR HEPATITIS C SCREENING TEST: ICD-10-CM

## 2018-03-27 DIAGNOSIS — Z96.659 INFECTION OF PROSTHETIC KNEE JOINT, SEQUELA: ICD-10-CM

## 2018-03-27 DIAGNOSIS — F32.1 MODERATE MAJOR DEPRESSION (H): ICD-10-CM

## 2018-03-27 DIAGNOSIS — Z13.220 LIPID SCREENING: ICD-10-CM

## 2018-03-27 DIAGNOSIS — J30.2 CHRONIC SEASONAL ALLERGIC RHINITIS, UNSPECIFIED TRIGGER: ICD-10-CM

## 2018-03-27 PROCEDURE — 99213 OFFICE O/P EST LOW 20 MIN: CPT | Performed by: PHYSICIAN ASSISTANT

## 2018-03-27 RX ORDER — FLUTICASONE PROPIONATE 50 MCG
1-2 SPRAY, SUSPENSION (ML) NASAL DAILY
Qty: 3 BOTTLE | Refills: 3 | Status: SHIPPED | OUTPATIENT
Start: 2018-03-27 | End: 2019-04-29

## 2018-03-27 NOTE — PATIENT INSTRUCTIONS
Stop your OTC nasal sprays  Start flonase  augmentin antibiotics  Let us know if this is not improving your congestion    Abdominal aorta ultrasound: For [facility] ([place], [place], [facility]) imaging departments: call [phone] to schedule    You are due for lab tests  - future lab order is already placed in computer system  Fasting labs: no food or drink (except water) for 12 hours before labs, make lab appointment   [facility] Lab Hours  Mon 7:45 am - 6:30 pm   Tues-Fri 7:45 am - 4:45 pm   Ph: [phone] - to schedule

## 2018-03-27 NOTE — PROGRESS NOTES
"SUBJECTIVE:                                                    Juan Lindsay is a 67 year old male who presents to clinic today for the following health issues:    ENT Symptoms             Symptoms: cc Present Absent Comment   Fever/Chills   x    Fatigue  x  Not sleeping well do to sinus pressure and pain   Muscle Aches   x    Eye Irritation   x    Sneezing   x    Nasal Brooks/Drg  x     Sinus Pressure/Pain x x     Loss of smell   x    Dental pain   x    Sore Throat   x    Swollen Glands   x    Ear Pain/Fullness   xx    Cough   x    Wheeze   x    Chest Pain   x    Shortness of breath   x    Rash       Other         Symptom duration:  a couple months   Symptom severity:  moderate   Treatments tried:  nasal spray, allegra D at night   Contacts:  none     Allergies to dust, mold in the winter, pollens. Was tested years ago  He uses flonase sometimes  He used decongestant nasal spray \"too long\"    He is taking lexapro 30 mg from psychiatry [facility], in [place]  See care everywhere for records from ortho and pain clinic  He currently does not have knee infection. Has upcoming appointment with ortho to discuss next steps as he still has a lot of knee pain  He is not taking antibiotics now, since September    Problem list and histories reviewed & adjusted, as indicated.  Additional history: none  According to [facility] Database, last controlled prescription was:  Regular fills of medications from Dr. Rupesh Roach [place]. See care everywhere    Patient Active Problem List   Diagnosis     Sleep apnea     ALCOH DEP not active for 20 years.     Tobacco use disorder     Allergic rhinitis due to other allergen     PAIN BACK, LOW     Anxiety state     Sedative, hypnotic or anxiolytic abuse, continuous     Moderate major depression (H)     Osteopenia     Health Care Home     Obstructive sleep apnea     Iron deficiency anemia     Anemia     Advanced directives, counseling/discussion     Long QT interval     Aftercare " "following joint replacement surgery     Infection of prosthetic knee joint (H)     Myofascial pain     Arthralgia of shoulder     Encounter for other administrative examinations     Prepatellar bursitis     Degeneration of intervertebral disc of lumbosacral region     Past Surgical History:   Procedure Laterality Date     COLONOSCOPY  10/8/1998    Colonoscopy     SURGICAL HISTORY OF -   1999    Uvulopalatopharyngoplasty with Tonsillecotomy     SURGICAL HISTORY OF -   2003    Septoplasty     SURGICAL HISTORY OF -       L Elbow     SURGICAL HISTORY OF -   2004     L Knee Arthroscopy     SURGICAL HISTORY OF -   10/2004    L Rotator Cuff Repair     SURGICAL HISTORY OF -       Ruptured appendix       Social History   Substance Use Topics     Smoking status: Light Tobacco Smoker     Packs/day: 0.50     Years: 25.00     Types: Cigarettes     Last attempt to quit: 2014     Smokeless tobacco: Never Used     Alcohol use No     Family History   Problem Relation Age of Onset     Lipids Mother      Alzheimer Disease Mother            Anemia Mother      Questionable     Lipids Father      CEREBROVASCULAR DISEASE Father      CANCER Father      Neurologic Disorder Brother      atacksia     Substance Abuse Brother      Alcohol/Drug Brother      Alcohal and Rx abuse. Suicide 2007     Psychotic Disorder Brother      Thyroid Disease Brother          Labs reviewed in EPIC    ROS:  Other than noted above, general, HEENT, respiratory, cardiac, MS, and gastrointestinal systems are negative.     OBJECTIVE:                                                    /84  Pulse 64  Temp 98.8  F (37.1  C) (Tympanic)  Ht 5' 9.29\" (1.76 m)  Wt 175 lb (79.4 kg)  SpO2 97%  BMI 25.63 kg/m2 Body mass index is 25.63 kg/(m^2).   GENERAL: healthy, alert, well nourished, well hydrated, no distress  HENT: ear canals- normal; TMs- normal; Nose- POSITIVE congestion; Mouth- no ulcers, no lesions POSITIVE tender with sinus " palpation  NECK: no tenderness, no adenopathy, no asymmetry, no masses, no stiffness; thyroid- normal to palpation  RESP: lungs clear to auscultation - no rales, no rhonchi, no wheezes  CV: regular rates and rhythm, normal S1 S2, no S3 or S4 and no murmur, no click or rub -  ABDOMEN: soft, no tenderness, no  hepatosplenomegaly, no masses, normal bowel sounds  MS: extremities- no gross deformities noted, no edema       ASSESSMENT/PLAN:                                                      ASSESSMENT/PLAN:      ICD-10-CM    1. Acute recurrent maxillary sinusitis J01.01 amoxicillin-clavulanate (AUGMENTIN) 875-125 MG per tablet   2. Chronic seasonal allergic rhinitis, unspecified trigger J30.2 fluticasone (FLONASE) 50 MCG/ACT spray   3. Tobacco use disorder F17.200    4. Moderate major depression (H) F32.1    5. Encounter for abdominal aortic aneurysm screening Z13.6 **Comprehensive metabolic panel FUTURE 1yr     US abdominal aorta limited   6. Need for hepatitis C screening test Z11.59 **Hepatitis C Screen Reflex to RNA FUTURE anytime   7. Lipid screening Z13.220 Lipid panel reflex to direct LDL Fasting   8. Infection of prosthetic knee joint, sequela T84.59XS     Z96.659      Health maintenance discussed.  Did discuss overuse of nasal decongestant sprays causing rebound/worsening symptoms. He states he knows this and this has happened in the past, and he uses flonase and that helps.    Patient Instructions   Stop your OTC nasal sprays  Start flonase  augmentin antibiotics  Let us know if this is not improving your congestion    Abdominal aorta ultrasound: For [facility] ([facility]) imaging departments: call [phone] to schedule    You are due for lab tests     reports that he has been smoking Cigarettes.  He has a 12.50 pack-year smoking history. He has never used smokeless tobacco.  Tobacco Cessation Action Plan: Information offered: Patient not interested at this time  Still smoking, patch has  worked in the past. <1ppd. He feels he is getting near ready to  Quit    Nguyen Lincoln PA-C   [facility]

## 2018-03-27 NOTE — MR AVS SNAPSHOT
After Visit Summary   3/27/2018    Juan Lindsay    MRN: [id number]           Patient Information     Date Of Birth          1950        Visit Information        Provider Department      3/27/2018 10:20 AM Nguyen Lincoln PA-C [facility]        Today's Diagnoses     Acute recurrent maxillary sinusitis    -  1    Chronic seasonal allergic rhinitis, unspecified trigger        Tobacco use disorder        Moderate major depression (H)        Encounter for abdominal aortic aneurysm screening        Need for hepatitis C screening test        Lipid screening          Care Instructions    Stop your OTC nasal sprays  Start flonase  augmentin antibiotics  Let us know if this is not improving your congestion    Abdominal aorta ultrasound: For [facility] imaging departments: call [phone] to schedule    You are due for lab tests  - future lab order is already placed in computer system  Fasting labs: no food or drink (except water) for 12 hours before labs, make lab appointment   [facility] Lab Hours  Mon 7:45 am - 6:30 pm   Tues-Fri 7:45 am - 4:45 pm   Ph: [phone] - to schedule           Follow-ups after your visit        Future tests that were ordered for you today     Open Future Orders        Priority Expected Expires Ordered    Lipid panel reflex to direct LDL Fasting Routine 2/25/2019 3/27/2019 3/27/2018    **Comprehensive metabolic panel FUTURE 1yr Routine 2/25/2019 3/27/2019 3/27/2018    **Hepatitis C Screen Reflex to RNA FUTURE anytime Routine 3/27/2018 3/27/2019 3/27/2018    US abdominal aorta limited Routine  3/27/2019 3/27/2018            Who to contact     Normal or non-critical lab and imaging results will be communicated to you by MyChart, letter or phone within 4 business days after the clinic has received the results. If you do not hear from us within 7 days, please contact the clinic through MyChart or phone. If you have a critical or  "abnormal lab result, we will notify you by phone as soon as possible.  Submit refill requests through [employer] or call your pharmacy and they will forward the refill request to us. Please allow 3 business days for your refill to be completed.          If you need to speak with a  for additional information , please call: [phone]             Additional Information About Your Visit        [employer] Information     [employer] gives you secure access to your electronic health record. If you see a primary care provider, you can also send messages to your care team and make appointments. If you have questions, please call your primary care clinic.  If you do not have a primary care provider, please call [phone] and they will assist you.        Care EveryWhere ID     This is your Care EveryWhere ID. This could be used by other organizations to access your [place] medical records  [id number]        Your Vitals Were     Pulse Temperature Height Pulse Oximetry BMI (Body Mass Index)       64 98.8  F (37.1  C) (Tympanic) 5' 9.29\" (1.76 m) 97% 25.63 kg/m2        Blood Pressure from Last 3 Encounters:   03/27/18 139/84   04/26/17 132/74   03/08/17 124/70    Weight from Last 3 Encounters:   03/27/18 175 lb (79.4 kg)   04/26/17 174 lb 3.2 oz (79 kg)   03/08/17 189 lb 1.6 oz (85.8 kg)                 Today's Medication Changes          These changes are accurate as of 3/27/18 11:16 AM.  If you have any questions, ask your nurse or doctor.               Start taking these medicines.        Dose/Directions    amoxicillin-clavulanate 875-125 MG per tablet   Commonly known as:  AUGMENTIN   Used for:  Acute recurrent maxillary sinusitis   Started by:  Nguyen Lincoln PA-C        Dose:  1 tablet   Take 1 tablet by mouth 2 times daily   Quantity:  20 tablet   Refills:  0         These medicines have changed or have updated prescriptions.        Dose/Directions    escitalopram 20 MG tablet   Commonly known as:  " LEXAPRO   This may have changed:  how much to take   Used for:  Anxiety state        Dose:  20 mg   Take 1 tablet (20 mg) by mouth daily   Quantity:  30 tablet   Refills:  0       gabapentin 600 MG tablet   Commonly known as:  NEURONTIN   This may have changed:  Another medication with the same name was removed. Continue taking this medication, and follow the directions you see here.   Changed by:  Nguyen Lincoln PA-C        Dose:  600 mg   Take 600 mg by mouth 3 times daily   Refills:  0            Where to get your medicines      These medications were sent to [facility] - [address]  [address], [place] [address]    [facility]:  [facility] Phone:  [phone]     amoxicillin-clavulanate 875-125 MG per tablet    fluticasone 50 MCG/ACT spray                Primary Care Provider Office Phone # Fax #    Radha Vivar [phone] [address] [facility] [address]        Equal Access to Services     [facility] AH: Hadii aad ku hadasho Soomaali, waaxda luqadaha, qaybta kaalmada adeegyada, waxay idiin hayshayn jesenia peoples . So [facility] [phone].    ATENCIÓN: Si habla español, tiene a alejo disposición servicios gratuitos de asistencia lingüística. [facility] [phone].    We comply with applicable federal civil rights laws and [place] laws. We do not discriminate on the basis of race, color, national origin, age, disability, sex, sexual orientation, or gender identity.            Thank you!     Thank you for choosing [facility]  for your care. Our goal is always to provide you with excellent care. Hearing back from our patients is one way we can continue to improve our services. Please take a few minutes to complete the written survey that you may receive in the mail after your visit with us. Thank you!             Your Updated Medication List - Protect others around you: Learn how to safely use, store  and throw away your medicines at www.disposemymeds.org.          This list is accurate as of 3/27/18 11:16 AM.  Always use your most recent med list.                   Brand Name Dispense Instructions for use Diagnosis    amoxicillin-clavulanate 875-125 MG per tablet    AUGMENTIN    20 tablet    Take 1 tablet by mouth 2 times daily    Acute recurrent maxillary sinusitis       aspirin  MG EC tablet      Take 325 mg by mouth        diazepam 5 MG tablet    VALIUM     Take 5 mg by mouth        DILAUDID PO      Take by mouth every 4 hours as needed for moderate to severe pain        escitalopram 20 MG tablet    LEXAPRO    30 tablet    Take 1 tablet (20 mg) by mouth daily    Anxiety state       fluticasone 50 MCG/ACT spray    FLONASE    3 Bottle    Spray 1-2 sprays into both nostrils daily    Chronic seasonal allergic rhinitis, unspecified trigger       gabapentin 600 MG tablet    NEURONTIN     Take 600 mg by mouth 3 times daily        hydrOXYzine 25 MG capsule    VISTARIL     Take 25 mg by mouth        ibuprofen 600 MG tablet    ADVIL/MOTRIN     Take 600 mg by mouth 2 times daily        IRON (FERROUS GLUCONATE) PO           MS CONTIN 15 MG 12 hr tablet   Generic drug:  morphine      Take 2 tablets (30 mg) by mouth 4 times daily        NORCO  MG per tablet   Generic drug:  HYDROcodone-acetaminophen      Take 2 tablets by mouth 3 times daily.        SENIOR MENS FORMULA OR      Take 1 tablet by mouth daily Reported on 4/26/2017        VITAMIN B12 PO      Take 1 tablet by mouth daily Reported on 4/26/2017        zolpidem 10 MG tablet    AMBIEN     Take 10 mg by mouth

## 2018-03-28 ASSESSMENT — PATIENT HEALTH QUESTIONNAIRE - PHQ9: SUM OF ALL RESPONSES TO PHQ QUESTIONS 1-9: 6

## 2018-08-03 ENCOUNTER — OFFICE VISIT (OUTPATIENT)
Dept: FAMILY MEDICINE | Facility: CLINIC | Age: 68
End: 2018-08-03
Payer: COMMERCIAL

## 2018-08-03 VITALS
SYSTOLIC BLOOD PRESSURE: 118 MMHG | TEMPERATURE: 97.9 F | HEART RATE: 86 BPM | DIASTOLIC BLOOD PRESSURE: 72 MMHG | BODY MASS INDEX: 26.62 KG/M2 | WEIGHT: 181.8 LBS | OXYGEN SATURATION: 97 %

## 2018-08-03 DIAGNOSIS — M25.512 CHRONIC LEFT SHOULDER PAIN: ICD-10-CM

## 2018-08-03 DIAGNOSIS — R53.83 OTHER FATIGUE: Primary | ICD-10-CM

## 2018-08-03 DIAGNOSIS — D50.9 IRON DEFICIENCY ANEMIA, UNSPECIFIED IRON DEFICIENCY ANEMIA TYPE: ICD-10-CM

## 2018-08-03 DIAGNOSIS — G89.29 CHRONIC LEFT SHOULDER PAIN: ICD-10-CM

## 2018-08-03 LAB
ALBUMIN SERPL-MCNC: 3.5 G/DL (ref 3.4–5)
ALP SERPL-CCNC: 75 U/L (ref 40–150)
ALT SERPL W P-5'-P-CCNC: 14 U/L (ref 0–70)
ANION GAP SERPL CALCULATED.3IONS-SCNC: 5 MMOL/L (ref 3–14)
AST SERPL W P-5'-P-CCNC: 19 U/L (ref 0–45)
BASOPHILS # BLD AUTO: 0 10E9/L (ref 0–0.2)
BASOPHILS NFR BLD AUTO: 0.2 %
BILIRUB SERPL-MCNC: 0.4 MG/DL (ref 0.2–1.3)
BUN SERPL-MCNC: 14 MG/DL (ref 7–30)
CALCIUM SERPL-MCNC: 8.4 MG/DL (ref 8.5–10.1)
CHLORIDE SERPL-SCNC: 109 MMOL/L (ref 94–109)
CO2 SERPL-SCNC: 26 MMOL/L (ref 20–32)
CREAT SERPL-MCNC: 1.2 MG/DL (ref 0.66–1.25)
DIFFERENTIAL METHOD BLD: ABNORMAL
EOSINOPHIL # BLD AUTO: 0.1 10E9/L (ref 0–0.7)
EOSINOPHIL NFR BLD AUTO: 0.8 %
ERYTHROCYTE [DISTWIDTH] IN BLOOD BY AUTOMATED COUNT: 13.8 % (ref 10–15)
GFR SERPL CREATININE-BSD FRML MDRD: 61 ML/MIN/1.7M2
GLUCOSE SERPL-MCNC: 90 MG/DL (ref 70–99)
HCT VFR BLD AUTO: 38 % (ref 40–53)
HGB BLD-MCNC: 12.3 G/DL (ref 13.3–17.7)
IRON SATN MFR SERPL: 4 % (ref 15–46)
IRON SERPL-MCNC: 13 UG/DL (ref 35–180)
LYMPHOCYTES # BLD AUTO: 3.4 10E9/L (ref 0.8–5.3)
LYMPHOCYTES NFR BLD AUTO: 22 %
MCH RBC QN AUTO: 32.4 PG (ref 26.5–33)
MCHC RBC AUTO-ENTMCNC: 32.4 G/DL (ref 31.5–36.5)
MCV RBC AUTO: 100 FL (ref 78–100)
MONOCYTES # BLD AUTO: 0.8 10E9/L (ref 0–1.3)
MONOCYTES NFR BLD AUTO: 5.2 %
NEUTROPHILS # BLD AUTO: 11.1 10E9/L (ref 1.6–8.3)
NEUTROPHILS NFR BLD AUTO: 71.8 %
PLATELET # BLD AUTO: 230 10E9/L (ref 150–450)
POTASSIUM SERPL-SCNC: 4.1 MMOL/L (ref 3.4–5.3)
PROT SERPL-MCNC: 6.8 G/DL (ref 6.8–8.8)
RBC # BLD AUTO: 3.8 10E12/L (ref 4.4–5.9)
SODIUM SERPL-SCNC: 140 MMOL/L (ref 133–144)
TIBC SERPL-MCNC: 294 UG/DL (ref 240–430)
WBC # BLD AUTO: 15.5 10E9/L (ref 4–11)

## 2018-08-03 PROCEDURE — 36415 COLL VENOUS BLD VENIPUNCTURE: CPT | Performed by: FAMILY MEDICINE

## 2018-08-03 PROCEDURE — 85025 COMPLETE CBC W/AUTO DIFF WBC: CPT | Performed by: FAMILY MEDICINE

## 2018-08-03 PROCEDURE — 99214 OFFICE O/P EST MOD 30 MIN: CPT | Performed by: FAMILY MEDICINE

## 2018-08-03 PROCEDURE — 83540 ASSAY OF IRON: CPT | Performed by: FAMILY MEDICINE

## 2018-08-03 PROCEDURE — 80053 COMPREHEN METABOLIC PANEL: CPT | Performed by: FAMILY MEDICINE

## 2018-08-03 PROCEDURE — 83550 IRON BINDING TEST: CPT | Performed by: FAMILY MEDICINE

## 2018-08-03 NOTE — MR AVS SNAPSHOT
After Visit Summary   8/3/2018    Juan Lindsay    MRN: [id number]           Patient Information     Date Of Birth          1950        Visit Information        Provider Department      8/3/2018 1:00 PM Víctor Grant MD [facility]        Today's Diagnoses     Other fatigue    -  1    Chronic left shoulder pain           Follow-ups after your visit        Additional Services     ORTHO  REFERRAL       [facility] is referring you to the Orthopedic  Services at [facility].       The  Representative will assist you in the coordination of your Orthopedic and Musculoskeletal Care as prescribed by your physician.    The  Representative will call you within 1 business day to help schedule your appointment, or you may contact the  Representative at:    All areas ~ [phone]     Type of Referral : Non Surgical     Has had left shoulder pain for many months. Had surgery on the left shoulder over 30 yrs ago. An old x-ray showed separation of the AC joint. ? If he will benefit from a cortisone shot  Timeframe requested: 3 - 5 days    Coverage of these services is subject to the terms and limitations of your health insurance plan.  Please call member services at your health plan with any benefit or coverage questions.      If X-rays, CT or MRI's have been performed, please contact the facility where they were done to arrange for , prior to your scheduled appointment.  Please bring this referral request to your appointment and present it to your specialist.                  Who to contact     If you have questions or need follow up information about today's clinic visit or your schedule please contact [facility] directly at [phone].  Normal or non-critical lab and imaging results will be communicated to you by MyChart, letter or phone within 4 business days after the clinic  has received the results. If you do not hear from us within 7 days, please contact the clinic through [employer] or phone. If you have a critical or abnormal lab result, we will notify you by phone as soon as possible.  Submit refill requests through [employer] or call your pharmacy and they will forward the refill request to us. Please allow 3 business days for your refill to be completed.          Additional Information About Your Visit        [facility] Information     [employer] gives you secure access to your electronic health record. If you see a primary care provider, you can also send messages to your care team and make appointments. If you have questions, please call your primary care clinic.  If you do not have a primary care provider, please call [phone] and they will assist you.        Care EveryWhere ID     This is your Care EveryWhere ID. This could be used by other organizations to access your [facility] medical records  [id number]        Your Vitals Were     Pulse Temperature Pulse Oximetry BMI (Body Mass Index)          86 97.9  F (36.6  C) (Tympanic) 97% 26.62 kg/m2         Blood Pressure from Last 3 Encounters:   08/03/18 118/72   03/27/18 139/84   04/26/17 132/74    Weight from Last 3 Encounters:   08/03/18 181 lb 12.8 oz (82.5 kg)   03/27/18 175 lb (79.4 kg)   04/26/17 174 lb 3.2 oz (79 kg)              We Performed the Following     CBC with platelets differential     Comprehensive metabolic panel     Iron and iron binding capacity     ORTHO [facility] REFERRAL          Today's Medication Changes          These changes are accurate as of 8/3/18  1:12 PM.  If you have any questions, ask your nurse or doctor.               These medicines have changed or have updated prescriptions.        Dose/Directions    escitalopram 20 MG tablet   Commonly known as:  LEXAPRO   This may have changed:  how much to take   Used for:  Anxiety state        Dose:  20 mg   Take 1 tablet (20 mg) by mouth daily   Quantity:  30  tablet   Refills:  0                Primary Care Provider Office Phone # Fax #    Radha Vivar [phone] [address] [place] [address]        Equal Access to Services     JESÚS GEORGESDEANNA : Haddeja rodríguez palacios keyanao Soniya, waaxda luqadaha, qaybta kaalmada roberto, ryen hernandezstephanie ruelas. So [facility] [phone].    ATENCIÓN: Si habla español, tiene a alejo disposición servicios gratuitos de asistencia lingüística. Ll[facility] [phone].    We comply with applicable federal civil rights laws and [place] laws. We do not discriminate on the basis of race, color, national origin, age, disability, sex, sexual orientation, or gender identity.            Thank you!     Thank you for choosing [facility]  for your care. Our goal is always to provide you with excellent care. Hearing back from our patients is one way we can continue to improve our services. Please take a few minutes to complete the written survey that you may receive in the mail after your visit with us. Thank you!             Your Updated Medication List - Protect others around you: Learn how to safely use, store and throw away your medicines at www.disposemymeds.org.          This list is accurate as of 8/3/18  1:12 PM.  Always use your most recent med list.                   Brand Name Dispense Instructions for use Diagnosis    aspirin 325 MG EC tablet      Take 325 mg by mouth        diazepam 5 MG tablet    VALIUM     Take 5 mg by mouth        DILAUDID PO      Take by mouth every 4 hours as needed for moderate to severe pain        escitalopram 20 MG tablet    LEXAPRO    30 tablet    Take 1 tablet (20 mg) by mouth daily    Anxiety state       fluticasone 50 MCG/ACT spray    FLONASE    3 Bottle    Spray 1-2 sprays into both nostrils daily    Chronic seasonal allergic rhinitis, unspecified trigger       gabapentin 600 MG tablet    NEURONTIN     Take 600 mg by mouth 3 times daily        hydrOXYzine  25 MG capsule    VISTARIL     Take 25 mg by mouth        ibuprofen 600 MG tablet    ADVIL/MOTRIN     Take 600 mg by mouth 2 times daily        IRON (FERROUS GLUCONATE) PO           MS CONTIN 15 MG 12 hr tablet   Generic drug:  morphine      Take 2 tablets (30 mg) by mouth 4 times daily        NORCO  MG per tablet   Generic drug:  HYDROcodone-acetaminophen      Take 2 tablets by mouth 3 times daily.        SENIOR MENS FORMULA OR      Take 1 tablet by mouth daily Reported on 4/26/2017        VITAMIN B12 PO      Take 1 tablet by mouth daily Reported on 4/26/2017        zolpidem 10 MG tablet    AMBIEN     Take 10 mg by mouth

## 2018-08-03 NOTE — PROGRESS NOTES
SUBJECTIVE:   Juan Lindsay is a 68 year old male who presents to clinic today for the following health issues:      Joint Pain    Onset: 2-3 months, worsening    Description:   Location: left shoulder  Character: Sharp and Gnawing    Intensity: moderate    Progression of Symptoms: worse    Accompanying Signs & Symptoms:  Other symptoms: numbness and radiates to his neck    History:   Previous similar pain: YES- HX of shoulder surgery      Precipitating factors:   Trauma or overuse: no     Alleviating factors:  Improved by: Norco, Dilaudid- has through [facility] from low back injury    Therapies Tried and outcome: Ibu, asa, chronic pain meds for low back pain    History as above also he has been more tired than usual.willike some labs done.        Problem list and histories reviewed & adjusted, as indicated.  Additional history: as documented    Patient Active Problem List   Diagnosis     Sleep apnea     ALCOH DEP not active for 28 years     Tobacco use disorder     Allergic rhinitis due to other allergen     PAIN BACK, LOW     Anxiety state     Sedative, hypnotic or anxiolytic abuse, continuous     Moderate major depression (H)     Osteopenia     Health Care Home     Obstructive sleep apnea     Iron deficiency anemia     Anemia     Advanced directives, counseling/discussion     Long QT interval     Aftercare following joint replacement surgery     Infection of prosthetic knee joint (H)     Myofascial pain     Arthralgia of shoulder     Encounter for other administrative examinations     Prepatellar bursitis     Degeneration of intervertebral disc of lumbosacral region     Past Surgical History:   Procedure Laterality Date     COLONOSCOPY  10/8/1998    Colonoscopy     SURGICAL HISTORY OF -   7/12/1999    Uvulopalatopharyngoplasty with Tonsillecotomy     SURGICAL HISTORY OF -   9/2003    Septoplasty     SURGICAL HISTORY OF -   2002    L Elbow     SURGICAL HISTORY OF -   4/2004     L Knee  Arthroscopy     SURGICAL HISTORY OF -   10/2004    L Rotator Cuff Repair     SURGICAL HISTORY OF -       Ruptured appendix       Social History   Substance Use Topics     Smoking status: Light Tobacco Smoker     Packs/day: 0.50     Years: 25.00     Types: Cigarettes     Last attempt to quit: 2014     Smokeless tobacco: Never Used     Alcohol use No     Family History   Problem Relation Age of Onset     Lipids Mother      Alzheimer Disease Mother            Anemia Mother      Questionable     Lipids Father      Cerebrovascular Disease Father      Cancer Father      Neurologic Disorder Brother      atacksia     Substance Abuse Brother      Alcohol/Drug Brother      Alcohal and Rx abuse. Suicide 2007     Psychotic Disorder Brother      Thyroid Disease Brother          Current Outpatient Prescriptions   Medication Sig Dispense Refill     aspirin  MG EC tablet Take 325 mg by mouth       Cyanocobalamin (VITAMIN B12 PO) Take 1 tablet by mouth daily Reported on 2017       escitalopram (LEXAPRO) 20 MG tablet Take 1 tablet (20 mg) by mouth daily (Patient taking differently: Take 30 mg by mouth daily ) 30 tablet 0     ferrous gluconate (FERGON) 324 (38 Fe) MG tablet Take 1 tablet (324 mg) by mouth daily (with breakfast) 100 tablet 0     gabapentin (NEURONTIN) 600 MG tablet Take 600 mg by mouth 3 times daily       hydrocodone-acetaminophen (NORCO)  MG per tablet Take 2 tablets by mouth 3 times daily.       HYDROmorphone HCl (DILAUDID PO) Take by mouth every 4 hours as needed for moderate to severe pain       hydrOXYzine (VISTARIL) 25 MG capsule Take 25 mg by mouth       ibuprofen (ADVIL/MOTRIN) 600 MG tablet Take 600 mg by mouth 2 times daily       IRON, FERROUS GLUCONATE, PO        morphine (MS CONTIN) 15 MG 12 hr tablet Take 2 tablets (30 mg) by mouth 4 times daily       diazepam (VALIUM) 5 MG tablet Take 5 mg by mouth       fluticasone (FLONASE) 50 MCG/ACT spray Spray 1-2 sprays into both  nostrils daily 3 Bottle 3     Nutritional Supplements (SENIOR MENS FORMULA OR) Take 1 tablet by mouth daily Reported on 4/26/2017       zolpidem (AMBIEN) 10 MG tablet Take 10 mg by mouth       No Active Allergies  BP Readings from Last 3 Encounters:   08/03/18 118/72   03/27/18 139/84   04/26/17 132/74    Wt Readings from Last 3 Encounters:   08/03/18 181 lb 12.8 oz (82.5 kg)   03/27/18 175 lb (79.4 kg)   04/26/17 174 lb 3.2 oz (79 kg)                  Labs reviewed in EPIC    Reviewed and updated as needed this visit by clinical staff       Reviewed and updated as needed this visit by Provider         ROS:  Constitutional, HEENT, cardiovascular, pulmonary, gi and gu systems are negative, except as otherwise noted.    OBJECTIVE:     /72 (BP Location: Left arm, Patient Position: Chair, Cuff Size: Adult Regular)  Pulse 86  Temp 97.9  F (36.6  C) (Tympanic)  Wt 181 lb 12.8 oz (82.5 kg)  SpO2 97%  BMI 26.62 kg/m2  Body mass index is 26.62 kg/(m^2).  GENERAL: healthy, alert and no distress  EYES: Eyes grossly normal to inspection, PERRL and conjunctivae and sclerae normal  HENT: ear canals and TM's normal, nose and mouth without ulcers or lesions  NECK: no adenopathy, no asymmetry, masses, or scars and thyroid normal to palpation  RESP: lungs clear to auscultation - no rales, rhonchi or wheezes  CV: regular rate and rhythm, normal S1 S2, no S3 or S4, no murmur, click or rub, no peripheral edema and peripheral pulses strong  ABDOMEN: soft, nontender, no hepatosplenomegaly, no masses and bowel sounds normal  MS: decreased range of motion in left shoulder    Diagnostic Test Results:  none     ASSESSMENT/PLAN:         ICD-10-CM    1. Other fatigue R53.83 CBC with platelets differential     Comprehensive metabolic panel     Iron and iron binding capacity   2. Chronic left shoulder pain M25.512 ORTHO  REFERRAL    G89.29    3. Iron deficiency anemia, unspecified iron deficiency anemia type D50.9 ferrous  gluconate (FERGON) 324 (38 Fe) MG tablet       FUTURE APPOINTMENTS:       - Follow-up visit as needed.    Víctor Grant MD  [facility]

## 2018-08-03 NOTE — NURSING NOTE
"Initial /72 (BP Location: Left arm, Patient Position: Chair, Cuff Size: Adult Regular)  Pulse 86  Temp 97.9  F (36.6  C) (Tympanic)  Wt 181 lb 12.8 oz (82.5 kg)  SpO2 97%  BMI 26.62 kg/m2 Estimated body mass index is 26.62 kg/(m^2) as calculated from the following:    Height as of 3/27/18: 5' 9.29\" (1.76 m).    Weight as of this encounter: 181 lb 12.8 oz (82.5 kg). .    Melani Israel    "

## 2018-08-06 NOTE — PROGRESS NOTES
Please inform patient that test result showed iron deficiency anemia.I would recommend taking Iron supplements. White count was a bit high. We can recheck this as patient was asymtomatic  Thank you.     Víctor Grant M.D.

## 2018-08-08 RX ORDER — FERROUS GLUCONATE 324(38)MG
324 TABLET ORAL
Qty: 100 TABLET | Refills: 0 | Status: SHIPPED | OUTPATIENT
Start: 2018-08-08

## 2018-08-16 ENCOUNTER — OFFICE VISIT (OUTPATIENT)
Dept: ORTHOPEDICS | Facility: CLINIC | Age: 68
End: 2018-08-16
Payer: COMMERCIAL

## 2018-08-16 ENCOUNTER — RADIANT APPOINTMENT (OUTPATIENT)
Dept: GENERAL RADIOLOGY | Facility: CLINIC | Age: 68
End: 2018-08-16
Attending: FAMILY MEDICINE
Payer: COMMERCIAL

## 2018-08-16 VITALS
BODY MASS INDEX: 26.81 KG/M2 | DIASTOLIC BLOOD PRESSURE: 76 MMHG | SYSTOLIC BLOOD PRESSURE: 122 MMHG | HEIGHT: 69 IN | WEIGHT: 181 LBS

## 2018-08-16 DIAGNOSIS — M25.512 PAIN IN JOINT OF LEFT SHOULDER: Primary | ICD-10-CM

## 2018-08-16 DIAGNOSIS — M25.512 PAIN IN JOINT OF LEFT SHOULDER: ICD-10-CM

## 2018-08-16 PROCEDURE — 99203 OFFICE O/P NEW LOW 30 MIN: CPT | Mod: 25 | Performed by: FAMILY MEDICINE

## 2018-08-16 PROCEDURE — 73030 X-RAY EXAM OF SHOULDER: CPT | Mod: LT

## 2018-08-16 PROCEDURE — 20611 DRAIN/INJ JOINT/BURSA W/US: CPT | Mod: LT | Performed by: FAMILY MEDICINE

## 2018-08-16 RX ORDER — TRIAMCINOLONE ACETONIDE 40 MG/ML
40 INJECTION, SUSPENSION INTRA-ARTICULAR; INTRAMUSCULAR ONCE
Qty: 1 ML | Refills: 0 | OUTPATIENT
Start: 2018-08-16 | End: 2018-08-16

## 2018-08-16 NOTE — PROGRESS NOTES
"Juan Lindsay  :  1950  DOS: 2018  MRN: [id number]    Sports Medicine Clinic Visit    PCP: Radha Vivar    Juan Lindsay is a 68 year old Right hand dominant male who is seen in consultation at the request of  Víctor Grant M.D. presenting with left shoulder pain.    Injury: chronic, worse in the last 5 year(s).  Pain located over left anterior shoulder.  Reports intermittent radiating, pain to left elbow.  Additional Features:  Positive: weakness.  Symptoms are better with Rest.  Symptoms are worse with: position needed to drive car or .  Other evaluation and/or treatments so far consists of: No Treatment tried to date.  Recent imaging completed: No recent imaging completed.  Prior History of related problems: 20+ yrs shoulde surgery related to never    Social History: retired, former [employer]    Review of Systems  Musculoskeletal: as above  Remainder of review of systems is negative including constitutional, CV, pulmonary, GI, Skin and Neurologic except as noted in HPI or medical history.    Past Medical History:   Diagnosis Date     Allergic rhinitis due to other allergen      Depressive disorder, not elsewhere classified      Other and unspecified alcohol dependence, unspecified drinking behavior      Tobacco use disorder      Unspecified sleep apnea      Past Surgical History:   Procedure Laterality Date     COLONOSCOPY  10/8/1998    Colonoscopy     SURGICAL HISTORY OF -   1999    Uvulopalatopharyngoplasty with Tonsillecotomy     SURGICAL HISTORY OF -   2003    Septoplasty     SURGICAL HISTORY OF -       L Elbow     SURGICAL HISTORY OF -   2004     L Knee Arthroscopy     SURGICAL HISTORY OF -   10/2004    L Rotator Cuff Repair     SURGICAL HISTORY OF -       Ruptured appendix       Objective  /76 (BP Location: Right arm, Patient Position: Chair, Cuff Size: Adult Regular)  Ht 5' 9\" (1.753 m)  Wt 181 lb (82.1 kg)  BMI 26.73 kg/m2    General: " healthy, alert and in no distress    HEENT: no scleral icterus or conjunctival erythema   Skin: no suspicious lesions or rash. No jaundice.   CV: regular rhythm by palpation, 2+ distal pulses, no pedal edema    Resp: normal respiratory effort without conversational dyspnea   Psych: normal mood and affect    Gait: nonantalgic, appropriate coordination and balance   Neuro: normal light touch sensory exam of the extremities. Motor strength as noted below     Left Shoulder exam    ROM:        Full active and passive ROM with flexion, extension, abduction, internal and external rotation.    Tender:        acromioclavicular joint       subacromial space    Non Tender:       remainder of shoulder       sternoclavicular joint       posterior shoulder       periscapular region    Strength:        abduction 5/5       internal rotation 5/5       external rotation 5/5       adduction 5/5    Impingement testing:        neg (-) Natarajan       positive (+) empty can       positive (+) crossover       neg (-) O'dimas       neg (-) crank    Stability testing:       neg (-) anterior glide       neg (-) sulcus sign    Skin:       no visible deformities       well perfused       capillary refill brisk    Sensation:        normal sensation over shoulder and upper extremity     Ultrasound Guided Left Subacromial Injection    Technique: The risks of the procedure were explained to the patient.  A consent was signed for the subacromial injection.  The patient was evaluated with a Codota ultrasound machine using a 12 MHz linear probe.     The Left subacromial area was prepped and draped in a sterile manner.  Ultrasound identification of the supraspinatus tendon and subacromial space. The probe was placed in long axis to the supraspinatus tendon.   A 1.5 inch 22 gauge needle was placed under ultrasound guidance into the subacromial space.  A mixture of 3 ml's of 0.5% ropivacaine and 1 ml kenalog (40mg/ml) was injected without difficulty.   The needle was removed and there was good hemostasis without complications.  There was ultrasound documentation of needle placement and injection.       Radiology  XR SHOULDER LT G/E 3 VW 8/16/2018 9:25 AM      HISTORY: ; Pain in joint of left shoulder         IMPRESSION: Absence of the distal clavicular head, presumably related  prior osteotomy, unchanged from 5/14/2014. The shoulder otherwise  appears within normal limits.       Assessment:  1. Pain in joint of left shoulder        Plan:  Discussed the assessment with the patient.  Follow up: prn  Some pain over AC joint today, as well as subacromial space  No AC joint injection available given prior distal clavicular osteotomy  XR images independently visualized and reviewed with patient today in clinic  PT available  Reassuring XR from standpoint of DJD, no GH significant OA appreciated   RTC appears strong and functional, despite irritation/inflammation sx  Expectations and goals of CSI reviewed  Often 2-3 days for steroid effect, and can take up to two weeks for maximum effect  We discussed modified progressive pain-free activity as tolerated  Do not overuse in first two weeks if feeling better due to concern for vulnerability while steroid is working  Supportive care reviewed  All questions were answered today  Contact us with additional questions or concerns  Signs and sx of concern reviewed    Thanks very much for sending this nice gentleman to us, I will keep you updated with his progress      Jovany Holbrook DO, CAQ  Primary Care Sports Medicine  [facility]

## 2018-08-16 NOTE — MR AVS SNAPSHOT
After Visit Summary   8/16/2018    Juan Lindsay    MRN: [id number]           Patient Information     Date Of Birth          1950        Visit Information        Provider Department      8/16/2018 9:00 AM Jovany Holbrook DO [facility]        Today's Diagnoses     Pain in joint of left shoulder    -  1       Follow-ups after your visit        Your next 10 appointments already scheduled     Aug 21, 2018 10:00 AM CDT   Pre-Op physical with Radha Vivar MD   [facility] ([facility]    [address] [facility] [address] [phone]              Who to contact     If you have questions or need follow up information about today's clinic visit or your schedule please contact [facility] directly at [phone].  Normal or non-critical lab and imaging results will be communicated to you by MyChart, letter or phone within 4 business days after the clinic has received the results. If you do not hear from us within 7 days, please contact the clinic through MyChart or phone. If you have a critical or abnormal lab result, we will notify you by phone as soon as possible.  Submit refill requests through [employer] or call your pharmacy and they will forward the refill request to us. Please allow 3 business days for your refill to be completed.          Additional Information About Your Visit        MyChart Information     [employer] gives you secure access to your electronic health record. If you see a primary care provider, you can also send messages to your care team and make appointments. If you have questions, please call your primary care clinic.  If you do not have a primary care provider, please call [phone] and they will assist you.        Care EveryWhere ID     This is your Care EveryWhere ID. This could be used by other organizations to access your [facility] medical records  [id number]       "  Your Vitals Were     Height BMI (Body Mass Index)                5' 9\" (1.753 m) 26.73 kg/m2           Blood Pressure from Last 3 Encounters:   08/16/18 122/76   08/03/18 118/72   03/27/18 139/84    Weight from Last 3 Encounters:   08/16/18 181 lb (82.1 kg)   08/03/18 181 lb 12.8 oz (82.5 kg)   03/27/18 175 lb (79.4 kg)              We Performed the Following     HC ARTHROCENTESIS ASPIR&/INJ MAJOR JT/BURSA W/US     TRIAMCINOLONE ACET INJ NOS          Today's Medication Changes          These changes are accurate as of 8/16/18 11:40 AM.  If you have any questions, ask your nurse or doctor.               Start taking these medicines.        Dose/Directions    triamcinolone acetonide 40 MG/ML injection   Commonly known as:  KENALOG   Used for:  Pain in joint of left shoulder   Started by:  Jovany Holbrook DO        Dose:  40 mg   1 mL (40 mg) by INTRA-ARTICULAR route once for 1 dose   Quantity:  1 mL   Refills:  0         These medicines have changed or have updated prescriptions.        Dose/Directions    escitalopram 20 MG tablet   Commonly known as:  LEXAPRO   This may have changed:  how much to take   Used for:  Anxiety state        Dose:  20 mg   Take 1 tablet (20 mg) by mouth daily   Quantity:  30 tablet   Refills:  0            Where to get your medicines      Some of these will need a paper prescription and others can be bought over the counter.  Ask your nurse if you have questions.     You don't need a prescription for these medications     triamcinolone acetonide 40 MG/ML injection                Primary Care Provider Office Phone # Fax #    Radha Vivar [phone] [address] [facility] [address]        Equal Access to Services     [facility] RYAN AH: Norman Koehler, shandra swain, qaybta kaalmada ryne mejia. So [facility] [phone].    ATENCIÓN: Si habla español, tiene a alejo disposición servicios gratuitos de " asistencia lingüística. Avinash al [phone].    We comply with applicable federal civil rights laws and [place] laws. We do not discriminate on the basis of race, color, national origin, age, disability, sex, sexual orientation, or gender identity.            Thank you!     Thank you for choosing [facility]  for your care. Our goal is always to provide you with excellent care. Hearing back from our patients is one way we can continue to improve our services. Please take a few minutes to complete the written survey that you may receive in the mail after your visit with us. Thank you!             Your Updated Medication List - Protect others around you: Learn how to safely use, store and throw away your medicines at www.disposemymeds.org.          This list is accurate as of 8/16/18 11:40 AM.  Always use your most recent med list.                   Brand Name Dispense Instructions for use Diagnosis    aspirin 325 MG EC tablet      Take 325 mg by mouth        diazepam 5 MG tablet    VALIUM     Take 5 mg by mouth        DILAUDID PO      Take by mouth every 4 hours as needed for moderate to severe pain        escitalopram 20 MG tablet    LEXAPRO    30 tablet    Take 1 tablet (20 mg) by mouth daily    Anxiety state       fluticasone 50 MCG/ACT spray    FLONASE    3 Bottle    Spray 1-2 sprays into both nostrils daily    Chronic seasonal allergic rhinitis, unspecified trigger       gabapentin 600 MG tablet    NEURONTIN     Take 600 mg by mouth 3 times daily        hydrOXYzine 25 MG capsule    VISTARIL     Take 25 mg by mouth        ibuprofen 600 MG tablet    ADVIL/MOTRIN     Take 600 mg by mouth 2 times daily        * IRON (FERROUS GLUCONATE) PO           * ferrous gluconate 324 (38 Fe) MG tablet    FERGON    100 tablet    Take 1 tablet (324 mg) by mouth daily (with breakfast)    Iron deficiency anemia, unspecified iron deficiency anemia type       MS CONTIN 15 MG 12 hr tablet   Generic  drug:  morphine      Take 2 tablets (30 mg) by mouth 4 times daily        NORCO  MG per tablet   Generic drug:  HYDROcodone-acetaminophen      Take 2 tablets by mouth 3 times daily.        SENIOR MENS FORMULA OR      Take 1 tablet by mouth daily Reported on 4/26/2017        triamcinolone acetonide 40 MG/ML injection    KENALOG    1 mL    1 mL (40 mg) by INTRA-ARTICULAR route once for 1 dose    Pain in joint of left shoulder       VITAMIN B12 PO      Take 1 tablet by mouth daily Reported on 4/26/2017        zolpidem 10 MG tablet    AMBIEN     Take 10 mg by mouth        * Notice:  This list has 2 medication(s) that are the same as other medications prescribed for you. Read the directions carefully, and ask your doctor or other care provider to review them with you.

## 2018-08-16 NOTE — LETTER
2018         RE: Juan Lindsay  [address] [facility] [address]        Dear Colleague,    Thank you for referring your patient, Juan Lindsay, to the [facility]. Please see a copy of my visit note below.    Juan Lindsay  :  1950  DOS: 2018  MRN: [id number]    Sports Medicine Clinic Visit    PCP: Radha Vivar    Juan Lindsay is a 68 year old Right hand dominant male who is seen in consultation at the request of  Víctor Grant M.D. presenting with left shoulder pain.    Injury: chronic, worse in the last 5 year(s).  Pain located over left anterior shoulder.  Reports intermittent radiating, pain to left elbow.  Additional Features:  Positive: weakness.  Symptoms are better with Rest.  Symptoms are worse with: position needed to drive car or .  Other evaluation and/or treatments so far consists of: No Treatment tried to date.  Recent imaging completed: No recent imaging completed.  Prior History of related problems: 20+ yrs shoulde surgery related to never    Social History: retired, former [employer]    Review of Systems  Musculoskeletal: as above  Remainder of review of systems is negative including constitutional, CV, pulmonary, GI, Skin and Neurologic except as noted in HPI or medical history.    Past Medical History:   Diagnosis Date     Allergic rhinitis due to other allergen      Depressive disorder, not elsewhere classified      Other and unspecified alcohol dependence, unspecified drinking behavior      Tobacco use disorder      Unspecified sleep apnea      Past Surgical History:   Procedure Laterality Date     COLONOSCOPY  10/8/1998    Colonoscopy     SURGICAL HISTORY OF -   1999    Uvulopalatopharyngoplasty with Tonsillecotomy     SURGICAL HISTORY OF -   2003    Septoplasty     SURGICAL HISTORY OF -       L Elbow     SURGICAL HISTORY OF -   2004     L Knee Arthroscopy     SURGICAL HISTORY OF -    "10/2004    L Rotator Cuff Repair     SURGICAL HISTORY OF -   19996    Ruptured appendix       Objective  /76 (BP Location: Right arm, Patient Position: Chair, Cuff Size: Adult Regular)  Ht 5' 9\" (1.753 m)  Wt 181 lb (82.1 kg)  BMI 26.73 kg/m2    General: healthy, alert and in no distress    HEENT: no scleral icterus or conjunctival erythema   Skin: no suspicious lesions or rash. No jaundice.   CV: regular rhythm by palpation, 2+ distal pulses, no pedal edema    Resp: normal respiratory effort without conversational dyspnea   Psych: normal mood and affect    Gait: nonantalgic, appropriate coordination and balance   Neuro: normal light touch sensory exam of the extremities. Motor strength as noted below     Left Shoulder exam    ROM:        Full active and passive ROM with flexion, extension, abduction, internal and external rotation.    Tender:        acromioclavicular joint       subacromial space    Non Tender:       remainder of shoulder       sternoclavicular joint       posterior shoulder       periscapular region    Strength:        abduction 5/5       internal rotation 5/5       external rotation 5/5       adduction 5/5    Impingement testing:        neg (-) Natarajan       positive (+) empty can       positive (+) crossover       neg (-) O[place]imas       neg (-) crank    Stability testing:       neg (-) anterior glide       neg (-) sulcus sign    Skin:       no visible deformities       well perfused       capillary refill brisk    Sensation:        normal sensation over shoulder and upper extremity     Ultrasound Guided Left Subacromial Injection    Technique: The risks of the procedure were explained to the patient.  A consent was signed for the subacromial injection.  The patient was evaluated with a [employer] ultrasound machine using a 12 MHz linear probe.     The Left subacromial area was prepped and draped in a sterile manner.  Ultrasound identification of the supraspinatus tendon and subacromial " space. The probe was placed in long axis to the supraspinatus tendon.   A 1.5 inch 22 gauge needle was placed under ultrasound guidance into the subacromial space.  A mixture of 3 ml's of 0.5% ropivacaine and 1 ml kenalog (40mg/ml) was injected without difficulty.  The needle was removed and there was good hemostasis without complications.  There was ultrasound documentation of needle placement and injection.       Radiology  XR SHOULDER LT G/E 3 VW 8/16/2018 9:25 AM      HISTORY: ; Pain in joint of left shoulder         IMPRESSION: Absence of the distal clavicular head, presumably related  prior osteotomy, unchanged from 5/14/2014. The shoulder otherwise  appears within normal limits.       Assessment:  1. Pain in joint of left shoulder        Plan:  Discussed the assessment with the patient.  Follow up: prn  Some pain over AC joint today, as well as subacromial space  No AC joint injection available given prior distal clavicular osteotomy  XR images independently visualized and reviewed with patient today in clinic  PT available  Reassuring XR from standpoint of DJD, no GH significant OA appreciated   RTC appears strong and functional, despite irritation/inflammation sx  Expectations and goals of CSI reviewed  Often 2-3 days for steroid effect, and can take up to two weeks for maximum effect  We discussed modified progressive pain-free activity as tolerated  Do not overuse in first two weeks if feeling better due to concern for vulnerability while steroid is working  Supportive care reviewed  All questions were answered today  Contact us with additional questions or concerns  Signs and sx of concern reviewed    Thanks very much for sending this nice gentleman to us, I will keep you updated with his progress      Jovany Holbrook DO, CATISHA  Primary Care Sports Medicine  [facility]               Again, thank you for allowing me to participate in the care of your patient.         Sincerely,        Jovany Holbrook, DO

## 2018-08-21 ENCOUNTER — OFFICE VISIT (OUTPATIENT)
Dept: FAMILY MEDICINE | Facility: CLINIC | Age: 68
End: 2018-08-21
Payer: COMMERCIAL

## 2018-08-21 VITALS
WEIGHT: 182.7 LBS | HEART RATE: 76 BPM | HEIGHT: 69 IN | BODY MASS INDEX: 27.06 KG/M2 | SYSTOLIC BLOOD PRESSURE: 132 MMHG | TEMPERATURE: 97.9 F | DIASTOLIC BLOOD PRESSURE: 72 MMHG

## 2018-08-21 DIAGNOSIS — T84.038S LOOSENING OF KNEE JOINT PROSTHESIS, SEQUELA: ICD-10-CM

## 2018-08-21 DIAGNOSIS — D50.9 IRON DEFICIENCY ANEMIA, UNSPECIFIED IRON DEFICIENCY ANEMIA TYPE: ICD-10-CM

## 2018-08-21 DIAGNOSIS — Z96.659 LOOSENING OF KNEE JOINT PROSTHESIS, SEQUELA: ICD-10-CM

## 2018-08-21 DIAGNOSIS — Z01.818 PREOP GENERAL PHYSICAL EXAM: Primary | ICD-10-CM

## 2018-08-21 LAB
ALBUMIN UR-MCNC: 30 MG/DL
APPEARANCE UR: CLEAR
BACTERIA #/AREA URNS HPF: ABNORMAL /HPF
BASOPHILS # BLD AUTO: 0 10E9/L (ref 0–0.2)
BASOPHILS NFR BLD AUTO: 0.4 %
BILIRUB UR QL STRIP: NEGATIVE
COLOR UR AUTO: YELLOW
DIFFERENTIAL METHOD BLD: ABNORMAL
EOSINOPHIL # BLD AUTO: 0.2 10E9/L (ref 0–0.7)
EOSINOPHIL NFR BLD AUTO: 1.9 %
ERYTHROCYTE [DISTWIDTH] IN BLOOD BY AUTOMATED COUNT: 14.2 % (ref 10–15)
FERRITIN SERPL-MCNC: 52 NG/ML (ref 26–388)
GLUCOSE UR STRIP-MCNC: NEGATIVE MG/DL
HCT VFR BLD AUTO: 42.5 % (ref 40–53)
HGB BLD-MCNC: 13.6 G/DL (ref 13.3–17.7)
HGB UR QL STRIP: NEGATIVE
KETONES UR STRIP-MCNC: NEGATIVE MG/DL
LEUKOCYTE ESTERASE UR QL STRIP: NEGATIVE
LYMPHOCYTES # BLD AUTO: 3.7 10E9/L (ref 0.8–5.3)
LYMPHOCYTES NFR BLD AUTO: 33.3 %
MCH RBC QN AUTO: 31.6 PG (ref 26.5–33)
MCHC RBC AUTO-ENTMCNC: 32 G/DL (ref 31.5–36.5)
MCV RBC AUTO: 99 FL (ref 78–100)
MONOCYTES # BLD AUTO: 1.1 10E9/L (ref 0–1.3)
MONOCYTES NFR BLD AUTO: 10.3 %
NEUTROPHILS # BLD AUTO: 6 10E9/L (ref 1.6–8.3)
NEUTROPHILS NFR BLD AUTO: 54.1 %
NITRATE UR QL: NEGATIVE
PH UR STRIP: 5.5 PH (ref 5–7)
PLATELET # BLD AUTO: 358 10E9/L (ref 150–450)
RBC # BLD AUTO: 4.3 10E12/L (ref 4.4–5.9)
RBC #/AREA URNS AUTO: ABNORMAL /HPF
SOURCE: ABNORMAL
SP GR UR STRIP: >1.03 (ref 1–1.03)
UROBILINOGEN UR STRIP-ACNC: 0.2 EU/DL (ref 0.2–1)
WBC # BLD AUTO: 11.1 10E9/L (ref 4–11)
WBC #/AREA URNS AUTO: ABNORMAL /HPF

## 2018-08-21 PROCEDURE — 85025 COMPLETE CBC W/AUTO DIFF WBC: CPT | Performed by: FAMILY MEDICINE

## 2018-08-21 PROCEDURE — 99214 OFFICE O/P EST MOD 30 MIN: CPT | Performed by: FAMILY MEDICINE

## 2018-08-21 PROCEDURE — 82728 ASSAY OF FERRITIN: CPT | Performed by: FAMILY MEDICINE

## 2018-08-21 PROCEDURE — 81001 URINALYSIS AUTO W/SCOPE: CPT | Performed by: FAMILY MEDICINE

## 2018-08-21 PROCEDURE — 36415 COLL VENOUS BLD VENIPUNCTURE: CPT | Performed by: FAMILY MEDICINE

## 2018-08-21 PROCEDURE — 93000 ELECTROCARDIOGRAM COMPLETE: CPT | Performed by: FAMILY MEDICINE

## 2018-08-21 NOTE — PROGRESS NOTES
[facility]  [address] [address]  [phone]  Dept: [phone]    PRE-OP EVALUATION:  Today's date: 2018    Juan Lindsay (: 1950) presents for pre-operative evaluation assessment as requested by [facility].  He requires evaluation and anesthesia risk assessment prior to undergoing surgery/procedure for treatment of RIGHT TOTAL KNEE ARTHROPLASTY REVISION .    Proposed Surgery/ Procedure: Right Knee Replacement   Date of Surgery/ Procedure: 18  Time of Surgery/ Procedure: [facility]  Hospital/Surgical Facility: [facility]   Fax number for surgical facility: [phone]  Primary Physician: Radha Vivar  Type of Anesthesia Anticipated: to be determined    Patient has a Health Care Directive or Living Will:  YES    1. NO - Do you have a history of heart attack, stroke, stent, bypass or surgery on an artery in the head, neck, heart or legs?  2. NO - Do you ever have any pain or discomfort in your chest?  3. NO - Do you have a history of  Heart Failure?  4. NO - Are you troubled by shortness of breath when: walking on the level, up a slight hill or at night?  5. NO - Do you currently have a cold, bronchitis or other respiratory infection?  6. NO - Do you have a cough, shortness of breath or wheezing?  7. YES - DO YOU SOMETIMES GET PAINS IN THE CALVES OF YOUR LEGS WHEN YOU WALK? Due to theknees   8. NO - Do you or anyone in your family have previous history of blood clots?  9. NO - Do you or does anyone in your family have a serious bleeding problem such as prolonged bleeding following surgeries or cuts?  10. YES - HAVE YOU EVER HAD PROBLEMS WITH ANEMIA OR BEEN TOLD TO TAKE IRON PILLS? Currently   11. NO - Have you had any abnormal blood loss such as black, tarry or bloody stools, or abnormal vaginal bleeding?  12. NO - Have you ever had a blood transfusion?  13. NO - Have you or any of your relatives ever had problems with  anesthesia?  14. NO - Do you have sleep apnea, excessive snoring or daytime drowsiness?  15. NO - Do you have any prosthetic heart valves?  16. YES - DO YOU HAVE PROSTHETIC JOINTS? Knees   17. NO - Is there any chance that you may be pregnant?      HPI:     HPI related to upcoming procedure: previous knee infection now with loose components         See problem list for active medical problems.  Problems all longstanding and stable, except as noted/documented.  See ROS for pertinent symptoms related to these conditions.                                                                                                                                                          .    MEDICAL HISTORY:     Patient Active Problem List    Diagnosis Date Noted     Infection of prosthetic knee joint (H) 04/29/2017     Priority: Medium     Aftercare following joint replacement surgery 03/17/2017     Priority: Medium     Prepatellar bursitis 03/08/2017     Priority: Medium     Long QT interval 08/03/2016     Priority: Medium     Myofascial pain 05/08/2015     Priority: Medium     Advanced directives, counseling/discussion 01/12/2015     Priority: Medium     Advance Care Planning:   ACP Review and Resources Provided:  Reviewed chart for advance care plan.  Juan RUIZ Angélica has no plan or code status on file however states presence of ACP document. Copy requested. Added by Keisha Hernandez on 1/12/2015             Anemia 11/03/2014     Priority: Medium     Iron deficiency anemia 04/30/2014     Priority: Medium     Encounter for other administrative examinations 03/04/2014     Priority: Medium     Overview:   [facility] with Dr. Rupesh Roach       Obstructive sleep apnea 07/01/2013     Priority: Medium     Arthralgia of shoulder 07/23/2012     Priority: Medium     Osteopenia 10/19/2011     Priority: Medium     10/19/2011 - recommended 5oo mg calcium with vit D tid. recheck in two years.       Moderate major depression (H) 10/12/2011      Priority: Medium     Sedative, hypnotic or anxiolytic abuse, continuous 03/04/2010     Priority: Medium     He came to see me one week ago complaining of significant increase in his anxiety. He told me that he had been off the Valium 5 mg twice a day since last May.  This was not true.  He had just seen Dr. Mallory at [facility] on February 8, 2010 and had been prescribed Valium 5 mg twice a day. During the visit I told him I would give him Ativan 1 mg up to three times a day for one months for love #90 pills.  I gave him a referral to psychiatry.  I will not be prescribing narcotics or tranquilizers in the future.       Anxiety state 09/18/2006     Priority: Medium     Problem list name updated by automated process. Provider to review       PAIN BACK, LOW 06/19/2006     Priority: Medium     Chronic - seen at [facility] 1/13/11 - continue same MS contin, norco, ambien dosing  F/u in March       Sleep apnea      Priority: Medium     Problem list name updated by automated process. Provider to review       ALCOH DEP not active for 28 years      Priority: Medium     Tobacco use disorder      Priority: Medium     Allergic rhinitis due to other allergen      Priority: Medium     Degeneration of intervertebral disc of lumbosacral region 05/25/2006     Priority: Medium     Health Care Home 06/27/2013     Priority: Low     EMERGENCY CARE PLAN  June 27, 2013: No current Care Coordination follow up planned. Please refer if Care Coordination services are needed.    Presenting Problem Signs and Symptoms Treatment Plan   Questions or concerns   during clinic hours   I will call my clinic directly:  [facility]  [id number] [place] [address]  [phone].    Questions or concerns outside clinic hours   I will call the 24 hour nurse line at   [phone] or [phone].   Need to schedule an appointment   I will call the 24 hour scheduling team at [phone] or my clinic directly at  [phone].    Same day treatment     I will call my clinic first, nurse line if after hours, urgent care and express care if needed.   Clinic care coordination services (regular clinic hours)     I will call a clinic care coordinator directly:     Festus Osman RN  Mon, Tues, Fri - [phone]  Wed, Thurs - [phone]    Ania Moyer, :    [phone]    Or call my clinic at [phone] and ask to speak with care coordination.   Crisis Services: Behavioral or Mental Health  [facility] 24 Hour Phone Line  [phone]    [facility] 24 Hour Crisis Services  [phone]    [facility] (Behavioral Health Providers) Network [phone]    [facility]   [phone]       Emergency treatment -- Immediately    CAll 911           Past Medical History:   Diagnosis Date     Allergic rhinitis due to other allergen      Depressive disorder, not elsewhere classified      Other and unspecified alcohol dependence, unspecified drinking behavior      Tobacco use disorder      Unspecified sleep apnea      Past Surgical History:   Procedure Laterality Date     COLONOSCOPY  10/8/1998    Colonoscopy     SURGICAL HISTORY OF -   7/12/1999    Uvulopalatopharyngoplasty with Tonsillecotomy     SURGICAL HISTORY OF -   9/2003    Septoplasty     SURGICAL HISTORY OF -   2002    L Elbow     SURGICAL HISTORY OF -   4/2004     L Knee Arthroscopy     SURGICAL HISTORY OF -   10/2004    L Rotator Cuff Repair     SURGICAL HISTORY OF -   19996    Ruptured appendix     Current Outpatient Prescriptions   Medication Sig Dispense Refill     aspirin  MG EC tablet Take 325 mg by mouth       Cyanocobalamin (VITAMIN B12 PO) Take 1 tablet by mouth 2 times daily Reported on 4/26/2017       diazepam (VALIUM) 5 MG tablet Take 5 mg by mouth as needed        escitalopram (LEXAPRO) 20 MG tablet Take 1 tablet (20 mg) by mouth daily (Patient taking differently: Take 30 mg by mouth daily ) 30 tablet 0     ferrous gluconate (FERGON)  "324 (38 Fe) MG tablet Take 1 tablet (324 mg) by mouth daily (with breakfast) 100 tablet 0     fluticasone (FLONASE) 50 MCG/ACT spray Spray 1-2 sprays into both nostrils daily 3 Bottle 3     gabapentin (NEURONTIN) 600 MG tablet Take 600 mg by mouth 3 times daily       hydrocodone-acetaminophen (NORCO)  MG per tablet Take 2 tablets by mouth 3 times daily.       HYDROmorphone HCl (DILAUDID PO) Take by mouth every 4 hours as needed for moderate to severe pain       hydrOXYzine (VISTARIL) 25 MG capsule Take 25 mg by mouth       ibuprofen (ADVIL/MOTRIN) 600 MG tablet Take 600 mg by mouth 2 times daily       morphine (MS CONTIN) 15 MG 12 hr tablet Take 2 tablets (30 mg) by mouth 4 times daily       Nutritional Supplements (SENIOR MENS FORMULA OR) Take 1 tablet by mouth daily Reported on 4/26/2017       zolpidem (AMBIEN) 10 MG tablet Take 10 mg by mouth       OTC products: None, except as noted above    No Active Allergies   Latex Allergy: NO    Social History   Substance Use Topics     Smoking status: Light Tobacco Smoker     Packs/day: 0.50     Years: 25.00     Types: Cigarettes     Last attempt to quit: 9/1/2014     Smokeless tobacco: Never Used     Alcohol use No     History   Drug Use No       REVIEW OF SYSTEMS:   Constitutional, neuro, ENT, endocrine, pulmonary, cardiac, gastrointestinal, genitourinary, musculoskeletal, integument and psychiatric systems are negative, except as otherwise noted.    EXAM:   /72  Pulse 76  Temp 97.9  F (36.6  C) (Tympanic)  Ht 5' 9\" (1.753 m)  Wt 182 lb 11.2 oz (82.9 kg)  BMI 26.98 kg/m2    GENERAL APPEARANCE: healthy, alert and no distress     EYES: EOMI,  PERRL     HENT: ear canals and TM's normal and nose and mouth without ulcers or lesions     NECK: no adenopathy, no asymmetry, masses, or scars and thyroid normal to palpation     RESP: lungs clear to auscultation - no rales, rhonchi or wheezes     CV: regular rates and rhythm, normal S1 S2, no S3 or S4 and no murmur, " click or rub     ABDOMEN:  soft, nontender, no HSM or masses and bowel sounds normal     MS: extremities normal- no gross deformities noted, no evidence of inflammation in joints, FROM in all extremities.     SKIN: no suspicious lesions or rashes     NEURO: Normal strength and tone, sensory exam grossly normal, mentation intact and speech normal     PSYCH: mentation appears normal. and affect normal/bright     LYMPHATICS: No cervical adenopathy    DIAGNOSTICS:     EKG: appears normal, NSR, normal axis, normal intervals, no acute ST/T changes c/w ischemia, no LVH by voltage criteria, unchanged from previous tracings  Labs Resulted Today:   Results for orders placed or performed in visit on 08/21/18   CBC with platelets and differential   Result Value Ref [facility]    WBC 11.1 (H) 4.0 - 11.0 10e9/L    RBC Count 4.30 (L) 4.4 - 5.9 10e12/L    Hemoglobin 13.6 13.3 - 17.7 g/dL    Hematocrit 42.5 40.0 - 53.0 %    MCV 99 78 - 100 fl    MCH 31.6 26.5 - 33.0 pg    MCHC 32.0 31.5 - 36.5 g/dL    RDW 14.2 10.0 - 15.0 %    Platelet Count 358 150 - 450 10e9/L    Diff Method Automated Method     % Neutrophils 54.1 %    % Lymphocytes 33.3 %    % Monocytes 10.3 %    % Eosinophils 1.9 %    % Basophils 0.4 %    Absolute Neutrophil 6.0 1.6 - 8.3 10e9/L    Absolute Lymphocytes 3.7 0.8 - 5.3 10e9/L    Absolute Monocytes 1.1 0.0 - 1.3 10e9/L    Absolute Eosinophils 0.2 0.0 - 0.7 10e9/L    Absolute Basophils 0.0 0.0 - 0.2 10e9/L   Ferritin   Result Value Ref Range    Ferritin 52 26 - 388 ng/mL   *UA reflex to Microscopic and Culture ([facility] (except [place] and [place])   Result Value Ref [facility]    Color Urine Yellow     Appearance Urine Clear     Glucose Urine Negative NEG^Negative mg/dL    Bilirubin Urine Negative NEG^Negative    Ketones Urine Negative NEG^Negative mg/dL    Specific Gravity Urine >1.030 1.003 - 1.035    Blood Urine Negative NEG^Negative    pH Urine 5.5 5.0 - 7.0 pH    Protein Albumin Urine 30 (A)  NEG^Negative mg/dL    Urobilinogen Urine 0.2 0.2 - 1.0 EU/dL    Nitrite Urine Negative NEG^Negative    Leukocyte Esterase Urine Negative NEG^Negative    Source Midstream Urine    Urine Microscopic   Result Value Ref [facility]    WBC Urine 0 - 5 OTO5^0 - 5 /HPF    RBC Urine O - 2 OTO2^O - 2 /HPF    Bacteria Urine Few (A) NEG^Negative /HPF       Recent Labs   Lab Test  08/03/18   1313  03/08/17   1232  10/20/16   1453   HGB  12.3*  10.6*  11.3*   PLT  230  337  321   NA  140   --   139   POTASSIUM  4.1   --   4.5   CR  1.20   --   1.19        IMPRESSION:   Reason for surgery/procedure: revision of knee due to pain     The proposed surgical procedure is considered INTERMEDIATE risk.    REVISED CARDIAC RISK INDEX  The patient has the following serious cardiovascular risks for perioperative complications such as (MI, PE, VFib and 3  AV Block):  No serious cardiac risks  INTERPRETATION: 0 risks: Class I (very low risk - 0.4% complication rate)    The patient has the following additional risks for perioperative complications:  High tolerance to opioid analgesics due to chronic narcotic use       ICD-10-CM    1. Preop general physical exam Z01.818 EKG 12-lead complete w/read - Clinics     *UA reflex to Microscopic and Culture ([facility] (except [place] and [place])     *UA reflex to Microscopic and Culture ([facility] (except [place] and [place])     Urine Microscopic     CANCELED: *UA reflex to Microscopic and Culture ([facility] (except [place] and [place])   2. Loosening of knee joint prosthesis, sequela T84.038S     Z96.659    3. Iron deficiency anemia, unspecified iron deficiency anemia type D50.9 CBC with platelets and differential     Ferritin       RECOMMENDATIONS:     --Consult hospital rounder / IM to assist post-op medical management    --Patient is to take all scheduled medications on the day of surgery EXCEPT for modifications listed below.    APPROVAL  GIVEN to proceed with proposed procedure, without further diagnostic evaluation       Signed Electronically by: Radha Vivar MD    Copy of this evaluation report is provided to requesting physician.    [facility] Preop Guidelines    Revised Cardiac Risk Index

## 2018-08-21 NOTE — MR AVS SNAPSHOT
After Visit Summary   8/21/2018    Juan Lindsay    MRN: [id number]           Patient Information     Date Of Birth          1950        Visit Information        Provider Department      8/21/2018 10:00 AM Radha Vivar MD [facility]        Today's Diagnoses     Preop general physical exam    -  1    Iron deficiency anemia, unspecified iron deficiency anemia type          Care Instructions      Before Your Surgery      Call your surgeon if there is any change in your health. This includes signs of a cold or flu (such as a sore throat, runny nose, cough, rash or fever).    Do not smoke, drink alcohol or take over the counter medicine (unless your surgeon or primary care doctor tells you to) for the 24 hours before and after surgery.    If you take prescribed drugs: Follow your doctor s orders about which medicines to take and which to stop until after surgery.    Eating and drinking prior to surgery: follow the instructions from your surgeon    Take a shower or bath the night before surgery. Use the soap your surgeon gave you to gently clean your skin. If you do not have soap from your surgeon, use your regular soap. Do not shave or scrub the surgery site.  Wear clean pajamas and have clean sheets on your bed.           Follow-ups after your visit        Who to contact     Normal or non-critical lab and imaging results will be communicated to you by [employer]te[employer], letter or phone within 4 business days after the clinic has received the results. If you do not hear from us within 7 days, please contact the clinic through MyChart or phone. If you have a critical or abnormal lab result, we will notify you by phone as soon as possible.  Submit refill requests through [email or web address] or call your pharmacy and they will forward the refill request to us. Please allow 3 business days for your refill to be completed.          If you need to speak with a  for additional information , please  "call: [phone]             Additional Information About Your Visit        [employer] Information     [facility] gives you secure access to your electronic health record. If you see a primary care provider, you can also send messages to your care team and make appointments. If you have questions, please call your primary care clinic.  If you do not have a primary care provider, please call [phone] and they will assist you.        Care EveryWhere ID     This is your Care EveryWhere ID. This could be used by other organizations to access your [facility] medical records  [id number]        Your Vitals Were     Pulse Temperature Height BMI (Body Mass Index)          76 97.9  F (36.6  C) (Tympanic) 5' 9\" (1.753 m) 26.98 kg/m2         Blood Pressure from Last 3 Encounters:   08/21/18 132/72   08/16/18 122/76   08/03/18 118/72    Weight from Last 3 Encounters:   08/21/18 182 lb 11.2 oz (82.9 kg)   08/16/18 181 lb (82.1 kg)   08/03/18 181 lb 12.8 oz (82.5 kg)              We Performed the Following     *UA reflex to Microscopic and Culture ([facility] (except [place] and [place])     CBC with platelets and differential     EKG 12-lead complete w/read - Clinics     Ferritin          Today's Medication Changes          These changes are accurate as of 8/21/18 10:29 AM.  If you have any questions, ask your nurse or doctor.               These medicines have changed or have updated prescriptions.        Dose/Directions    escitalopram 20 MG tablet   Commonly known as:  LEXAPRO   This may have changed:  how much to take   Used for:  Anxiety state        Dose:  20 mg   Take 1 tablet (20 mg) by mouth daily   Quantity:  30 tablet   Refills:  0       ferrous gluconate 324 (38 Fe) MG tablet   Commonly known as:  FERGON   This may have changed:  Another medication with the same name was removed. Continue taking this medication, and follow the directions you see here.   Used for:  Iron deficiency anemia, " unspecified iron deficiency anemia type        Dose:  324 mg   Take 1 tablet (324 mg) by mouth daily (with breakfast)   Quantity:  100 tablet   Refills:  0                Primary Care Provider Office Phone # Fax #    Radha Vivar [phone] [address] [facility] [address]        Equal Access to Services     [facility] Center[facility] : Hadii aad ku hadasho Soomaali, waaxda luqadaha, qaybta kaalmada adeegyada, waxshelly sarain hayshayn adeyang nassar lamaria tn . So [facility] [phone].    ATENCIÓN: Si habla español, tiene a alejo disposición servicios gratuitos de asistencia lingüística. Miname al [phone].    We comply with applicable federal civil rights laws and [place] laws. We do not discriminate on the basis of race, color, national origin, age, disability, sex, sexual orientation, or gender identity.            Thank you!     Thank you for choosing [facility]  for your care. Our goal is always to provide you with excellent care. Hearing back from our patients is one way we can continue to improve our services. Please take a few minutes to complete the written survey that you may receive in the mail after your visit with us. Thank you!             Your Updated Medication List - Protect others around you: Learn how to safely use, store and throw away your medicines at www.disposemymeds.org.          This list is accurate as of 8/21/18 10:29 AM.  Always use your most recent med list.                   Brand Name Dispense Instructions for use Diagnosis    aspirin 325 MG EC tablet      Take 325 mg by mouth        diazepam 5 MG tablet    VALIUM     Take 5 mg by mouth as needed        DILAUDID PO      Take by mouth every 4 hours as needed for moderate to severe pain        escitalopram 20 MG tablet    LEXAPRO    30 tablet    Take 1 tablet (20 mg) by mouth daily    Anxiety state       ferrous gluconate 324 (38 Fe) MG tablet    FERGON    100 tablet    Take 1 tablet (324 mg) by mouth daily (with  breakfast)    Iron deficiency anemia, unspecified iron deficiency anemia type       fluticasone 50 MCG/ACT spray    FLONASE    3 Bottle    Spray 1-2 sprays into both nostrils daily    Chronic seasonal allergic rhinitis, unspecified trigger       gabapentin 600 MG tablet    NEURONTIN     Take 600 mg by mouth 3 times daily        hydrOXYzine 25 MG capsule    VISTARIL     Take 25 mg by mouth        ibuprofen 600 MG tablet    ADVIL/MOTRIN     Take 600 mg by mouth 2 times daily        MS CONTIN 15 MG 12 hr tablet   Generic drug:  morphine      Take 2 tablets (30 mg) by mouth 4 times daily        NORCO  MG per tablet   Generic drug:  HYDROcodone-acetaminophen      Take 2 tablets by mouth 3 times daily.        SENIOR MENS FORMULA OR      Take 1 tablet by mouth daily Reported on 4/26/2017        VITAMIN B12 PO      Take 1 tablet by mouth 2 times daily Reported on 4/26/2017        zolpidem 10 MG tablet    AMBIEN     Take 10 mg by mouth

## 2018-08-27 ENCOUNTER — TELEPHONE (OUTPATIENT)
Dept: FAMILY MEDICINE | Facility: CLINIC | Age: 68
End: 2018-08-27

## 2018-08-27 NOTE — TELEPHONE ENCOUNTER
Message  Received: 4 days ago       Radha Vivar MD  P [facility] Triage Pool                     Please call and tell patient that his labs were much better. I have cleared him for surgery .

## 2018-08-27 NOTE — TELEPHONE ENCOUNTER
Called patient to relay Dr Nicholson response. Informed patient of order to recheck his CBC at his convince at any [place] lab/clinic. Faxed H&P [place] FORBES/ELIAS

## 2018-08-30 DIAGNOSIS — Z13.220 LIPID SCREENING: ICD-10-CM

## 2018-08-30 DIAGNOSIS — D72.829 LEUKOCYTOSIS, UNSPECIFIED TYPE: ICD-10-CM

## 2018-08-30 DIAGNOSIS — Z11.59 NEED FOR HEPATITIS C SCREENING TEST: ICD-10-CM

## 2018-08-30 LAB
BASOPHILS # BLD AUTO: 0.1 10E9/L (ref 0–0.2)
BASOPHILS NFR BLD AUTO: 0.5 %
CHOLEST SERPL-MCNC: 179 MG/DL
DIFFERENTIAL METHOD BLD: ABNORMAL
EOSINOPHIL # BLD AUTO: 0.2 10E9/L (ref 0–0.7)
EOSINOPHIL NFR BLD AUTO: 2.3 %
ERYTHROCYTE [DISTWIDTH] IN BLOOD BY AUTOMATED COUNT: 14.6 % (ref 10–15)
HCT VFR BLD AUTO: 41.2 % (ref 40–53)
HCV AB SERPL QL IA: NONREACTIVE
HDLC SERPL-MCNC: 70 MG/DL
HGB BLD-MCNC: 13.4 G/DL (ref 13.3–17.7)
LDLC SERPL CALC-MCNC: 88 MG/DL
LYMPHOCYTES # BLD AUTO: 2.9 10E9/L (ref 0.8–5.3)
LYMPHOCYTES NFR BLD AUTO: 31 %
MCH RBC QN AUTO: 31.8 PG (ref 26.5–33)
MCHC RBC AUTO-ENTMCNC: 32.5 G/DL (ref 31.5–36.5)
MCV RBC AUTO: 98 FL (ref 78–100)
MONOCYTES # BLD AUTO: 0.8 10E9/L (ref 0–1.3)
MONOCYTES NFR BLD AUTO: 8.4 %
NEUTROPHILS # BLD AUTO: 5.4 10E9/L (ref 1.6–8.3)
NEUTROPHILS NFR BLD AUTO: 57.8 %
NONHDLC SERPL-MCNC: 109 MG/DL
PLATELET # BLD AUTO: 250 10E9/L (ref 150–450)
RBC # BLD AUTO: 4.22 10E12/L (ref 4.4–5.9)
TRIGL SERPL-MCNC: 105 MG/DL
WBC # BLD AUTO: 9.4 10E9/L (ref 4–11)

## 2018-08-30 PROCEDURE — 86803 HEPATITIS C AB TEST: CPT | Performed by: FAMILY MEDICINE

## 2018-08-30 PROCEDURE — 36415 COLL VENOUS BLD VENIPUNCTURE: CPT | Performed by: FAMILY MEDICINE

## 2018-08-30 PROCEDURE — 80061 LIPID PANEL: CPT | Performed by: PHYSICIAN ASSISTANT

## 2018-08-30 PROCEDURE — 85025 COMPLETE CBC W/AUTO DIFF WBC: CPT | Performed by: FAMILY MEDICINE

## 2018-08-31 ENCOUNTER — MYC MEDICAL ADVICE (OUTPATIENT)
Dept: FAMILY MEDICINE | Facility: CLINIC | Age: 68
End: 2018-08-31

## 2018-09-11 ENCOUNTER — TRANSFERRED RECORDS (OUTPATIENT)
Dept: HEALTH INFORMATION MANAGEMENT | Facility: CLINIC | Age: 68
End: 2018-09-11

## 2018-09-17 ENCOUNTER — OFFICE VISIT (OUTPATIENT)
Dept: FAMILY MEDICINE | Facility: CLINIC | Age: 68
End: 2018-09-17
Payer: COMMERCIAL

## 2018-09-17 VITALS
WEIGHT: 182 LBS | BODY MASS INDEX: 26.96 KG/M2 | HEIGHT: 69 IN | HEART RATE: 63 BPM | TEMPERATURE: 97.8 F | SYSTOLIC BLOOD PRESSURE: 119 MMHG | DIASTOLIC BLOOD PRESSURE: 63 MMHG

## 2018-09-17 DIAGNOSIS — Z96.651 S/P REVISION OF TOTAL KNEE, RIGHT: Primary | ICD-10-CM

## 2018-09-17 PROCEDURE — 99213 OFFICE O/P EST LOW 20 MIN: CPT | Performed by: FAMILY MEDICINE

## 2018-09-17 ASSESSMENT — ANXIETY QUESTIONNAIRES
GAD7 TOTAL SCORE: 0
6. BECOMING EASILY ANNOYED OR IRRITABLE: NOT AT ALL
1. FEELING NERVOUS, ANXIOUS, OR ON EDGE: NOT AT ALL
5. BEING SO RESTLESS THAT IT IS HARD TO SIT STILL: NOT AT ALL
3. WORRYING TOO MUCH ABOUT DIFFERENT THINGS: NOT AT ALL
2. NOT BEING ABLE TO STOP OR CONTROL WORRYING: NOT AT ALL
7. FEELING AFRAID AS IF SOMETHING AWFUL MIGHT HAPPEN: NOT AT ALL
IF YOU CHECKED OFF ANY PROBLEMS ON THIS QUESTIONNAIRE, HOW DIFFICULT HAVE THESE PROBLEMS MADE IT FOR YOU TO DO YOUR WORK, TAKE CARE OF THINGS AT HOME, OR GET ALONG WITH OTHER PEOPLE: NOT DIFFICULT AT ALL

## 2018-09-17 ASSESSMENT — PAIN SCALES - GENERAL: PAINLEVEL: SEVERE PAIN (6)

## 2018-09-17 ASSESSMENT — PATIENT HEALTH QUESTIONNAIRE - PHQ9: 5. POOR APPETITE OR OVEREATING: NOT AT ALL

## 2018-09-17 NOTE — PATIENT INSTRUCTIONS
Elevate the leg more when sitting for the next few days  Keep getting up every hour while awake   Keep up the icing

## 2018-09-17 NOTE — MR AVS SNAPSHOT
"              After Visit Summary   9/17/2018    Juan Lindsay    MRN: [id number]           Patient Information     Date Of Birth          1950        Visit Information        Provider Department      9/17/2018 1:00 PM Radha Vivar MD [facility]        Care Instructions    Elevate the leg more when sitting for the next few days  Keep getting up every hour while awake   Keep up the icing             Follow-ups after your visit        Who to contact     Normal or non-critical lab and imaging results will be communicated to you by Displairhart, letter or phone within 4 business days after the clinic has received the results. If you do not hear from us within 7 days, please contact the clinic through Exuru[employer] or phone. If you have a critical or abnormal lab result, we will notify you by phone as soon as possible.  Submit refill requests through [employer] or call your pharmacy and they will forward the refill request to us. Please allow 3 business days for your refill to be completed.          If you need to speak with a  for additional information , please call: [phone]             Additional Information About Your Visit        DisplairharKadient Information     [employer] gives you secure access to your electronic health record. If you see a primary care provider, you can also send messages to your care team and make appointments. If you have questions, please call your primary care clinic.  If you do not have a primary care provider, please call [phone] and they will assist you.        Care EveryWhere ID     This is your Care EveryWhere ID. This could be used by other organizations to access your [facility] medical records  [id number]        Your Vitals Were     Pulse Temperature Height BMI (Body Mass Index)          63 97.8  F (36.6  C) (Tympanic) 5' 9\" (1.753 m) 26.88 kg/m2         Blood Pressure from Last 3 Encounters:   09/17/18 119/63   08/21/18 132/72   08/16/18 122/76    Weight from " Last 3 Encounters:   09/17/18 182 lb (82.6 kg)   08/21/18 182 lb 11.2 oz (82.9 kg)   08/16/18 181 lb (82.1 kg)              Today, you had the following     No orders found for display         Today's Medication Changes          These changes are accurate as of 9/17/18  1:25 PM.  If you have any questions, ask your nurse or doctor.               These medicines have changed or have updated prescriptions.        Dose/Directions    escitalopram 20 MG tablet   Commonly known as:  LEXAPRO   This may have changed:  how much to take   Used for:  Anxiety state        Dose:  20 mg   Take 1 tablet (20 mg) by mouth daily   Quantity:  30 tablet   Refills:  0                Primary Care Provider Office Phone # Fax #    Radha Vivar [phone] [id number] [facility] [address]        Equal Access to Services     [facility] [place] : Hadii rodríguez ridleyo Soniya, waaxda luqadaha, qaybta kaalmada roberto, ryne peoples . So [facility] [phone].    ATENCIÓN: Si habla español, tiene a alejo disposición servicios gratuitos de asistencia lingüística. Llame al [phone].    We comply with applicable federal civil rights laws and [place] laws. We do not discriminate on the basis of race, color, national origin, age, disability, sex, sexual orientation, or gender identity.            Thank you!     Thank you for choosing [facility]  for your care. Our goal is always to provide you with excellent care. Hearing back from our patients is one way we can continue to improve our services. Please take a few minutes to complete the written survey that you may receive in the mail after your visit with us. Thank you!             Your Updated Medication List - Protect others around you: Learn how to safely use, store and throw away your medicines at www.disposemymeds.org.          This list is accurate as of 9/17/18  1:25 PM.  Always use your most recent med list.                    Brand Name Dispense Instructions for use Diagnosis    aspirin 325 MG EC tablet      Take 325 mg by mouth        diazepam 5 MG tablet    VALIUM     Take 5 mg by mouth as needed        DILAUDID PO      Take by mouth every 4 hours as needed for moderate to severe pain        escitalopram 20 MG tablet    LEXAPRO    30 tablet    Take 1 tablet (20 mg) by mouth daily    Anxiety state       ferrous gluconate 324 (38 Fe) MG tablet    FERGON    100 tablet    Take 1 tablet (324 mg) by mouth daily (with breakfast)    Iron deficiency anemia, unspecified iron deficiency anemia type       fluticasone 50 MCG/ACT spray    FLONASE    3 Bottle    Spray 1-2 sprays into both nostrils daily    Chronic seasonal allergic rhinitis, unspecified trigger       gabapentin 600 MG tablet    NEURONTIN     Take 600 mg by mouth 3 times daily        hydrOXYzine 25 MG capsule    VISTARIL     Take 25 mg by mouth        ibuprofen 600 MG tablet    ADVIL/MOTRIN     Take 600 mg by mouth 2 times daily        MS CONTIN 15 MG 12 hr tablet   Generic drug:  morphine      Take 2 tablets (30 mg) by mouth 4 times daily        NORCO  MG per tablet   Generic drug:  HYDROcodone-acetaminophen      Take 2 tablets by mouth 3 times daily.        SENIOR MENS FORMULA OR      Take 1 tablet by mouth daily Reported on 4/26/2017        VITAMIN B12 PO      Take 1 tablet by mouth 2 times daily Reported on 4/26/2017        zolpidem 10 MG tablet    AMBIEN     Take 10 mg by mouth

## 2018-09-17 NOTE — PROGRESS NOTES
SUBJECTIVE:                                                    Juan Lindsay is a 68 year old male who presents to clinic today for the following health issues:        Hospital Follow-up Visit:    Hospital/Nursing Home/IP Rehab Facility: [facility]  Date of Admission: 09/11/2018  Date of Discharge: 09/13/2018  Reason(s) for Admission: total knee replacement            Problems taking medications regularly:  None       Medication changes since discharge: None       Problems adhering to non-medication therapy:  None        Summary of hospitalization:  [facility] discharge summary reviewed  Diagnostic Tests/Treatments reviewed.  Follow up needed: none  Other Healthcare Providers Involved in Patient s Care:         None  Update since discharge: improved. Now not even using the walker     Post Discharge Medication Reconciliation: discharge medications reconciled, continue medications without change.  Plan of care communicated with patient     Coding guidelines for this visit:  Type of Medical   Decision Making Face-to-Face Visit       within 7 Days of discharge Face-to-Face Visit        within 14 days of discharge   Moderate Complexity [id number]   High Complexity [id number]              Problem list and histories reviewed & adjusted, as indicated.  Additional history: reviewed discharge labs all good   The wbc was back to normal  He has not had any fever no chills pain is minimal   No cough   Sleeping on the couch due to pain       Patient Active Problem List   Diagnosis     Sleep apnea     ALCOH DEP not active for 28 years     Tobacco use disorder     Allergic rhinitis due to other allergen     PAIN BACK, LOW     Anxiety state     Sedative, hypnotic or anxiolytic abuse, continuous     Moderate major depression (H)     Osteopenia     Health Care Home     Obstructive sleep apnea     Iron deficiency anemia     Anemia     Advanced directives, counseling/discussion     Long QT interval     Aftercare following joint  "replacement surgery     Infection of prosthetic knee joint (H)     Myofascial pain     Arthralgia of shoulder     Encounter for other administrative examinations     Prepatellar bursitis     Degeneration of intervertebral disc of lumbosacral region     Past Surgical History:   Procedure Laterality Date     COLONOSCOPY  10/8/1998    Colonoscopy     SURGICAL HISTORY OF -   1999    Uvulopalatopharyngoplasty with Tonsillecotomy     SURGICAL HISTORY OF -   2003    Septoplasty     SURGICAL HISTORY OF -       L Elbow     SURGICAL HISTORY OF -   2004     L Knee Arthroscopy     SURGICAL HISTORY OF -   10/2004    L Rotator Cuff Repair     SURGICAL HISTORY OF -       Ruptured appendix       Social History   Substance Use Topics     Smoking status: Light Tobacco Smoker     Packs/day: 0.50     Years: 25.00     Types: Cigarettes     Last attempt to quit: 2014     Smokeless tobacco: Never Used     Alcohol use No     Family History   Problem Relation Age of Onset     Lipids Mother      Alzheimer Disease Mother            Anemia Mother      Questionable     Lipids Father      Cerebrovascular Disease Father      Cancer Father      Neurologic Disorder Brother      atacksia     Substance Abuse Brother      Alcohol/Drug Brother      Alcohal and Rx abuse. Suicide      Psychotic Disorder Brother      Thyroid Disease Brother            ROS:  Constitutional, HEENT, cardiovascular, pulmonary, gi and gu systems are negative, except as otherwise noted.    OBJECTIVE:                                                    /63 (BP Location: Right arm, Patient Position: Sitting, Cuff Size: Adult Regular)  Pulse 63  Temp 97.8  F (36.6  C) (Tympanic)  Ht 5' 9\" (1.753 m)  Wt 182 lb (82.6 kg)  BMI 26.88 kg/m2 Body mass index is 26.88 kg/(m^2).   GENERAL: healthy, alert, well nourished, well hydrated, no distress  NECK: no tenderness, no adenopathy, no asymmetry, no masses, no stiffness; thyroid- normal to " palpation  RESP: lungs clear to auscultation - no rales, no rhonchi, no wheezes  CV: regular rates and rhythm, normal S1 S2, no S3 or S4 and no murmur, no click or rub -  Ms edema with socks on the knee is warm not red non tender distally no calf tenderness no swelling left leg           ASSESSMENT/PLAN:                                                    1. S/P revision of total knee, right    No labs needed after reviewing   Doing well  Patient Instructions   Elevate the leg more when sitting for the next few days  Keep getting up every hour while awake   Keep up the icing                reports that he has been smoking Cigarettes.  He has a 12.50 pack-year smoking history. He has never used smokeless tobacco.  Tobacco Cessation Action Plan: Information offered: Patient not interested at this time        Radha Vivar M.D.    [facility]

## 2018-09-18 ASSESSMENT — ANXIETY QUESTIONNAIRES: GAD7 TOTAL SCORE: 0

## 2018-09-18 ASSESSMENT — PATIENT HEALTH QUESTIONNAIRE - PHQ9: SUM OF ALL RESPONSES TO PHQ QUESTIONS 1-9: 0

## 2018-10-08 ENCOUNTER — TRANSFERRED RECORDS (OUTPATIENT)
Dept: HEALTH INFORMATION MANAGEMENT | Facility: CLINIC | Age: 68
End: 2018-10-08

## 2018-12-04 ENCOUNTER — HOSPITAL ENCOUNTER (OUTPATIENT)
Dept: PHYSICAL THERAPY | Facility: CLINIC | Age: 68
Setting detail: THERAPIES SERIES
End: 2018-12-04
Attending: ORTHOPAEDIC SURGERY
Payer: MEDICARE

## 2018-12-04 PROCEDURE — G8979 MOBILITY GOAL STATUS: HCPCS | Mod: GP,CI | Performed by: PHYSICAL THERAPIST

## 2018-12-04 PROCEDURE — 40000718 ZZHC STATISTIC PT DEPARTMENT ORTHO VISIT: Performed by: PHYSICAL THERAPIST

## 2018-12-04 PROCEDURE — 97140 MANUAL THERAPY 1/> REGIONS: CPT | Mod: GP | Performed by: PHYSICAL THERAPIST

## 2018-12-04 PROCEDURE — G8978 MOBILITY CURRENT STATUS: HCPCS | Mod: GP,CJ | Performed by: PHYSICAL THERAPIST

## 2018-12-04 PROCEDURE — 97110 THERAPEUTIC EXERCISES: CPT | Mod: GP | Performed by: PHYSICAL THERAPIST

## 2018-12-04 PROCEDURE — 97161 PT EVAL LOW COMPLEX 20 MIN: CPT | Mod: GP | Performed by: PHYSICAL THERAPIST

## 2018-12-05 NOTE — PROGRESS NOTES
[facility]          OUTPATIENT PHYSICAL THERAPY ORTHOPEDIC EVALUATION  PLAN OF TREATMENT FOR OUTPATIENT REHABILITATION  (COMPLETE FOR INITIAL CLAIMS ONLY)  Patient's Last Name, First Name, M.I.  YOB: 1950  Juan Lindsay    Provider s Name:  [facility]   Medical Record No.  [id number]   Start of Care Date:  12/04/18   Onset Date:  09/11/18 (Date of Surgery)   Type:     _X__PT   ___OT   ___SLP Medical Diagnosis:  S/P right total knee arthroplasty revision     PT Diagnosis:  TKA revision leading to strength and flexibility defiicts of LE and difficulty with ambulation   Visits from SOC:  1      _________________________________________________________________________________  Plan of Treatment/Functional Goals:  balance training, gait training, joint mobilization, manual therapy, neuromuscular re-education, strengthening, stretching, ROM     Cryotherapy, Hot packs     Goals  Goal Identifier: HEP  Goal Description: Pt will be independent in [facility] in order to achieve and maintain long term treatment goals.  Target Date: 01/04/19    Goal Identifier: Stairs  Goal Description: Pt will be able to ascend and descend a flight of stairs at home with a reciprical gait pattern and a handrail assist safely.  Target Date: 01/29/19    Goal Identifier: Ambulation  Goal Description: Pt will be able to ambulate community distances to run errands with a minimal increase in LE pain 1-2/10.  Target Date: 01/29/19       Therapy Frequency:   (1-2 times / week)  Predicted Duration of Therapy Intervention:  8 weeks    Segundo Phillips, PT                 I CERTIFY THE NEED FOR THESE SERVICES FURNISHED UNDER        THIS PLAN OF TREATMENT AND WHILE UNDER MY CARE .             Physician Signature               Date    X_____________________________________________________                             Certification Date From:  12/04/18   Certification Date To:  01/29/19    Referring  Provider:  Bright Gonzales    Initial Assessment        See Epic Evaluation Start of Care Date: 12/04/18

## 2018-12-05 NOTE — PROGRESS NOTES
"Juan Lindsay  1950 Physical Therapy Progress Note  12/04/18 1400   General Information   Type of Visit Initial OP Ortho PT Evaluation   Start of Care Date 12/04/18   Referring Physician Bright Gonzales   Patient/Family Goals Statement Go up stairs withou pain   Orders Evaluate and Treat   Date of Order 11/29/18   Insurance Type Medicare;[employer]   Insurance Comments/Visits Authorized 365   Medical Diagnosis S/P right total knee arthroplasty revision   Surgical/Medical history reviewed Yes   Precautions/Limitations no known precautions/limitations   Body Part(s)   Body Part(s) Knee   Presentation and Etiology   Pertinent history of current problem (include personal factors and/or comorbidities that impact the POC) Had a TKA in 2006 and \"had nothing but trouble\". Knee got infected and had to flush it out twice and was on antibiotics for a long time. Then \"knee got loose\". Had knee revised 9/11/2018. Had some pain on outside of knee so went back to doctor. Was doing HEP at home, no formal physical therapy. Gets pain in outside of knee and goes to top of foot. Can only wear comfortable shoes because of pain on top of foot. / Comorbidities - Tobacco use disorder, Anxiety, Moderate major depression, Osteopenia, Iron deficiency anemia, Low back pain     Impairments A. Pain;E. Decreased flexibility;F. Decreased strength and endurance;H. Impaired gait   Functional Limitations perform activities of daily living;perform desired leisure / sports activities   Symptom Location Medial and lateral knee joint / Lateral lower leg to dorsum of foot   How/Where did it occur Other  (Surgery)   Onset date of current episode/exacerbation 09/11/18  (Date of Surgery)   Chronicity Chronic   Pain rating (0-10 point scale) Best (/10);Worst (/10)   Best (/10) 4   Worst (/10) 8   Pain quality A. Sharp;C. Aching;F. Stabbing   Frequency of pain/symptoms C. With activity   Pain/symptoms are: The same all the time   Pain/symptoms exacerbated " by B. Walking;C. Lifting;D. Carrying;M. Other   Pain exacerbation comment Stairs   Pain/symptoms eased by A. Sitting;C. Rest;K. Other   Pain eased by comment Lying down   Progression of symptoms since onset: Unchanged   Current Level of Function   Patient role/employment history F. Retired   Living environment House/townhome   Home/community accessibility 2 flights of stairs with rail   Current equipment-Gait/Locomotion None   Fall Risk Screen   Fall screen completed by PT   Have you fallen 2 or more times in the past year? Yes   Have you fallen and had an injury in the past year? No   Timed Up and Go score (seconds) 10   Is patient a fall risk? No   Knee Objective Findings   Side (if bilateral, select both right and left) Right   Observation Mild swelling of knee joint and lower leg   Integumentary  No signs of infection noted at surgical site   Gait/Locomotion Ambulates with good heel strike and toe off. Good balance. No AD.   Knee Special Test Comments Negative SLR and Slump tests   Palpation Hypertonicity of tibialis anterior, gastrocnemius and hamstrings   Right Knee Extension PROM 4 degrees short of TKE / Left - 2 degrees of hyper-extension   Right Knee Flexion PROM 130 / Left - 127   Right Knee Flexion Strength 3/5 with pain   Right Knee Extension Strength 3/5 with pain   Right Hip Abduction Strength 4/5 B   Right Gastrocnemius Flexibility Moderately restricted   Right Hamstring Flexibility Moderatly restricted on right / Mildly restricted on left   Planned Therapy Interventions   Planned Therapy Interventions balance training;gait training;joint mobilization;manual therapy;neuromuscular re-education;strengthening;stretching;ROM   Planned Modality Interventions   Planned Modality Interventions Cryotherapy;Hot packs   Clinical Impression   Criteria for Skilled Therapeutic Interventions Met yes, treatment indicated   PT Diagnosis TKA revision leading to strength and flexibility defiicts of LE and difficulty  with ambulation   Influenced by the following impairments Pain, Flexibility and strength deficits   Functional limitations due to impairments Difficulty climbing stairs, ambulation   Clinical Presentation Stable/Uncomplicated   Clinical Presentation Rationale Several comorbidities impacting PT / 1 body system / Stable   Clinical Decision Making (Complexity) Low complexity   Therapy Frequency (1-2 times / week)   Predicted Duration of Therapy Intervention (days/wks) 8 weeks   Risk & Benefits of therapy have been explained Yes   Patient, Family & other staff in agreement with plan of care Yes   Education Assessment   Preferred Learning Style Listening;Reading;Demonstration;Pictures/video   Barriers to Learning No barriers   ORTHO GOALS   PT Ortho Eval Goals 1;2;3;4   Ortho Goal 1   Goal Identifier HEP   Goal Description Pt will be independent in HEP in order to achieve and maintain long term treatment goals.   Target Date 01/04/19   Ortho Goal 2   Goal Identifier Stairs   Goal Description Pt will be able to ascend and descend a flight of stairs at home with a reciprical gait pattern and a handrail assist safely.   Target Date 01/29/19   Ortho Goal 3   Goal Identifier Ambulation   Goal Description Pt will be able to ambulate community distances to run errands with a minimal increase in LE pain 1-2/10.   Target Date 01/29/19   Total Evaluation Time   Total Evaluation Time 20   Therapy Certification   Certification date from 12/04/18   Certification date to 01/29/19   Medical Diagnosis S/P right total knee arthroplasty revision     Stewart Phillips, PT, DPT

## 2018-12-06 ENCOUNTER — HOSPITAL ENCOUNTER (OUTPATIENT)
Dept: PHYSICAL THERAPY | Facility: CLINIC | Age: 68
Setting detail: THERAPIES SERIES
End: 2018-12-06
Attending: ORTHOPAEDIC SURGERY
Payer: MEDICARE

## 2018-12-06 PROCEDURE — 97110 THERAPEUTIC EXERCISES: CPT | Mod: GP | Performed by: PHYSICAL THERAPIST

## 2018-12-06 PROCEDURE — 40000718 ZZHC STATISTIC PT DEPARTMENT ORTHO VISIT: Performed by: PHYSICAL THERAPIST

## 2018-12-11 ENCOUNTER — HOSPITAL ENCOUNTER (OUTPATIENT)
Dept: PHYSICAL THERAPY | Facility: CLINIC | Age: 68
Setting detail: THERAPIES SERIES
End: 2018-12-11
Attending: ORTHOPAEDIC SURGERY
Payer: MEDICARE

## 2018-12-11 PROCEDURE — 97110 THERAPEUTIC EXERCISES: CPT | Mod: GP | Performed by: PHYSICAL THERAPIST

## 2018-12-13 ENCOUNTER — HOSPITAL ENCOUNTER (OUTPATIENT)
Dept: PHYSICAL THERAPY | Facility: CLINIC | Age: 68
Setting detail: THERAPIES SERIES
End: 2018-12-13
Attending: ORTHOPAEDIC SURGERY
Payer: MEDICARE

## 2018-12-13 PROCEDURE — 97140 MANUAL THERAPY 1/> REGIONS: CPT | Mod: GP | Performed by: PHYSICAL THERAPIST

## 2018-12-13 PROCEDURE — 97110 THERAPEUTIC EXERCISES: CPT | Mod: GP | Performed by: PHYSICAL THERAPIST

## 2018-12-20 ENCOUNTER — HOSPITAL ENCOUNTER (OUTPATIENT)
Dept: PHYSICAL THERAPY | Facility: CLINIC | Age: 68
Setting detail: THERAPIES SERIES
End: 2018-12-20
Attending: ORTHOPAEDIC SURGERY
Payer: MEDICARE

## 2018-12-20 PROCEDURE — 97110 THERAPEUTIC EXERCISES: CPT | Mod: GP | Performed by: PHYSICAL THERAPIST

## 2019-01-03 ENCOUNTER — HOSPITAL ENCOUNTER (OUTPATIENT)
Dept: PHYSICAL THERAPY | Facility: CLINIC | Age: 69
Setting detail: THERAPIES SERIES
End: 2019-01-03
Attending: ORTHOPAEDIC SURGERY
Payer: MEDICARE

## 2019-01-03 PROCEDURE — 97110 THERAPEUTIC EXERCISES: CPT | Mod: GP | Performed by: PHYSICAL THERAPIST

## 2019-01-03 NOTE — PROGRESS NOTES
Juan Lindsay  1950  Diagnosis - S/P right total knee arthroplasty revision Physical Therapy Progress Note  01/03/19 0900   Signing Clinician's Name / Credentials   Signing clinician's name / credentials Stewart Phillips, PT, DPT   Session Number   Session Number 6 (Start of Care Date - 12/4/2018)   Progress Note/Recertification   Progress Note Due Date 01/04/19   Progress Note Completed Date 01/03/19   Recertification Due Date 01/29/19   Adult Goals   PT Ortho Eval Goals 1;2;3;4   Ortho Goal 1   Goal Identifier HEP   Goal Description Pt will be independent in HEP in order to achieve and maintain long term treatment goals.  (Mostly met. Had difficulty doing knee excs due to back pain)   Target Date 01/04/19   Ortho Goal 2   Goal Identifier Stairs   Goal Description Pt will be able to ascend and descend a flight of stairs at home with a reciprical gait pattern and a handrail assist safely.  (Not met)   Target Date 01/29/19   Ortho Goal 3   Goal Identifier Ambulation   Goal Description Pt will be able to ambulate community distances to run errands with a minimal increase in LE pain 1-2/10.  (Partially met. Gets pain after 15-20 minutes. Improving.)   Target Date 01/29/19   Subjective Report   Subjective Report Was lifting in the basement and was twisting at the same time and hurt his back. Has not been able to do much for the knee this week because of back. Sharp pain in knee is gone though.    Objective Measures   Objective Measures Objective Measure 1;Objective Measure 2;Objective Measure 3   Objective Measure 1   Objective Measure ROM   Details 0-129   Objective Measure 3   Objective Measure Integument   Details Small open wound over incision with no discharge noted and minimal redness. Minimal swelling. No fever reported.   Treatment Interventions   Interventions Therapeutic Procedure/Exercise;Manual Therapy   Therapeutic Procedure/exercise   Therapeutic Procedures: strength, endurance, ROM, flexibillity minutes  (97240) 28   Skilled Intervention Stretching and strengthening exercise education   Patient Response Able to perform all exercises with a minimal increase in back discomfort.   Treatment Detail Lumbar rotations x5B with 20 second holds / Double knee to chest 2x30 seconds / Gastroc stretch 3x30 seconds / AAROM for extension in supine x3 with 60 second holds / Mini lunges x15 B / Standing hip abduction x15 B / Mini squats at sink 2x15   Education   Learner Patient   Readiness Acceptance   Method Booklet/handout;Explanation;Demonstration   Response Verbalizes Understanding;Demonstrates Understanding   Plan   Homework Pt advised to keep an eye on incision and to contact surgeon if he gets a fever, swelling increases, discharge from wound is noted, redness around wound increases substantially.   Home program Lumbar rotations / Double knee to chest / Standing hip abduction / Gastroc stretch / Mini squats at sink / TKE in standing / HS curls in standing / HS stretch / Tibialis anterior stretch 4x30 seconds / Heel slides x15 / HS stretch in sitting x10 with 20 second holds / SLR in supine 2x5   Plan for next session Stairs / Sit to stand / STM / Hip abduction and extension strengthening   Comments   Comments Pt is doing well overall as his ROM and strength continue to improve. Pt has not met goals as of yet, but is making progress towards all goals. Pt had a setback this week due to a flare-up in back pain which limited his ability to perform HEP. Pt was able to perform all HEP exercises today without an increase in low back pain. Pt would benefit from continued physical therapy in order to build on these gains and improve ability to perform functional activities.   Total Session Time   Timed Code Treatment Minutes 28   Total Treatment Time (sum of timed and untimed services) 28     Referring Physician - Bright Gonzales

## 2019-01-09 ENCOUNTER — OFFICE VISIT (OUTPATIENT)
Dept: FAMILY MEDICINE | Facility: CLINIC | Age: 69
End: 2019-01-09
Payer: MEDICARE

## 2019-01-09 ENCOUNTER — ANCILLARY PROCEDURE (OUTPATIENT)
Dept: GENERAL RADIOLOGY | Facility: CLINIC | Age: 69
End: 2019-01-09
Payer: MEDICARE

## 2019-01-09 VITALS
HEART RATE: 72 BPM | SYSTOLIC BLOOD PRESSURE: 138 MMHG | DIASTOLIC BLOOD PRESSURE: 74 MMHG | WEIGHT: 180 LBS | TEMPERATURE: 96.5 F | BODY MASS INDEX: 26.66 KG/M2 | HEIGHT: 69 IN

## 2019-01-09 DIAGNOSIS — G89.29 CHRONIC BILATERAL LOW BACK PAIN WITHOUT SCIATICA: Primary | ICD-10-CM

## 2019-01-09 DIAGNOSIS — M54.50 CHRONIC BILATERAL LOW BACK PAIN WITHOUT SCIATICA: ICD-10-CM

## 2019-01-09 DIAGNOSIS — F11.20 NARCOTIC DEPENDENCE (H): ICD-10-CM

## 2019-01-09 DIAGNOSIS — G89.29 CHRONIC BILATERAL LOW BACK PAIN WITHOUT SCIATICA: ICD-10-CM

## 2019-01-09 DIAGNOSIS — F32.1 MODERATE MAJOR DEPRESSION (H): ICD-10-CM

## 2019-01-09 DIAGNOSIS — M54.50 CHRONIC BILATERAL LOW BACK PAIN WITHOUT SCIATICA: Primary | ICD-10-CM

## 2019-01-09 PROCEDURE — 72100 X-RAY EXAM L-S SPINE 2/3 VWS: CPT | Mod: FY

## 2019-01-09 PROCEDURE — 99213 OFFICE O/P EST LOW 20 MIN: CPT | Performed by: FAMILY MEDICINE

## 2019-01-09 RX ORDER — METHOCARBAMOL 750 MG/1
750 TABLET, FILM COATED ORAL 4 TIMES DAILY PRN
Qty: 120 TABLET | Refills: 1 | Status: SHIPPED | OUTPATIENT
Start: 2019-01-09 | End: 2019-03-07

## 2019-01-09 ASSESSMENT — MIFFLIN-ST. JEOR: SCORE: 1586.85

## 2019-01-09 NOTE — PROGRESS NOTES
SUBJECTIVE:                                                    Juan Lindsay is a 68 year old male who presents to clinic today for the following health issues:    Back Pain     Duration:   Back started acking up again about 1.5 week   Lifting wrong while moving some boxes.   In the morning lower middle back and as the day goes on it spreads to both sides.   Goes to the [facility] and would like a second option.   Sleeping on the couch is helping.     Description:   Location of pain: low back bilateral  Character of pain: deep ache, in the morning stabbing pain.   Pain radiation:none    History:   Pain interferes with job: Not applicable  History of back problems: previous spinal surgery - no  Any previous MRI or X-rays: Yes- Sees a specialist for back pain  Therapies tried without relief: cold and heat    Alleviating factors:   Improved by: none      Precipitating factors:  Worsened by: Walking        Problem list and histories reviewed & adjusted, as indicated.  Additional history: has nt ohad any recent xrays last mri a few years ago he does see the pain specialist he is on a lot of pain medications. It is possible that he is now oversensitized to pain.   He has had this pain in the past usually last johanna a day or 2 no radiation to the legs all in the back       Patient Active Problem List   Diagnosis     Sleep apnea     ALCOH DEP not active for 28 years     Tobacco use disorder     Allergic rhinitis due to other allergen     PAIN BACK, LOW     Anxiety state     Sedative, hypnotic or anxiolytic abuse, continuous (H)     Moderate major depression (H)     Osteopenia     Health Care Home     Obstructive sleep apnea     Iron deficiency anemia     Anemia     Advanced directives, counseling/discussion     Long QT interval     Aftercare following joint replacement surgery     Infection of prosthetic knee joint (H)     Myofascial pain     Arthralgia of shoulder     Encounter for other administrative  "examinations     Prepatellar bursitis     Degeneration of intervertebral disc of lumbosacral region     Past Surgical History:   Procedure Laterality Date     COLONOSCOPY  10/8/1998    Colonoscopy     SURGICAL HISTORY OF -   1999    Uvulopalatopharyngoplasty with Tonsillecotomy     SURGICAL HISTORY OF -   2003    Septoplasty     SURGICAL HISTORY OF -       L Elbow     SURGICAL HISTORY OF -   2004     L Knee Arthroscopy     SURGICAL HISTORY OF -   10/2004    L Rotator Cuff Repair     SURGICAL HISTORY OF -       Ruptured appendix       Social History     Tobacco Use     Smoking status: Light Tobacco Smoker     Packs/day: 0.50     Years: 25.00     Pack years: 12.50     Types: Cigarettes     Last attempt to quit: 2014     Years since quittin.3     Smokeless tobacco: Never Used   Substance Use Topics     Alcohol use: No     Family History   Problem Relation Age of Onset     Lipids Mother      Alzheimer Disease Mother               Anemia Mother         Questionable     Lipids Father      Cerebrovascular Disease Father      Cancer Father      Neurologic Disorder Brother         atacksia     Substance Abuse Brother      Alcohol/Drug Brother         Alcohal and Rx abuse. Suicide      Psychotic Disorder Brother      Thyroid Disease Brother            ROS:  Constitutional, HEENT, cardiovascular, pulmonary, gi and gu systems are negative, except as otherwise noted.    OBJECTIVE:                                                    /74   Pulse 72   Temp 96.5  F (35.8  C) (Tympanic)   Ht 1.753 m (5' 9\")   Wt 81.6 kg (180 lb)   BMI 26.58 kg/m   Body mass index is 26.58 kg/m .   GENERAL APPEARANCE: healthy, alert and no distress  ABDOMEN: soft, nontender, without hepatosplenomegaly or masses and bowel sounds normal  Comprehensive back pain exam:  Tenderness of bilateral iliolumbar areas with some muscle spasm in the paralumbar muscles , Pain limits the following motions: extension and " rotatation, Lower extremity strength functional and equal on both sides and Lower extremity sensation normal and equal on both sides  PSYCH: mentation appears normal, affect normal/bright and fatigued     Xray reviewed with patient. No evidence of fracture ,lysis there is a lot of facet joint arthritis   ASSESSMENT/PLAN:                                                    1. Chronic bilateral low back pain without sciatica    - XR Lumbar Spine 2/3 Views; Future  - [facility]; Future  - methocarbamol (ROBAXIN) 750 MG tablet; Take 1 tablet (750 mg) by mouth 4 times daily as needed for muscle spasms  Dispense: 120 tablet; Refill: 1    2. Narcotic dependence (H)  Seen at pain [facility] makes treating his pain harder      3. Moderate major depression (H)  Stable mood.      reports that he has been smoking cigarettes.  He has a 12.50 pack-year smoking history. he has never used smokeless tobacco.  Tobacco Cessation Action Plan: Information offered: Patient not interested at this time        Radha Vivar M.D.    [facility]

## 2019-01-10 ENCOUNTER — HOSPITAL ENCOUNTER (OUTPATIENT)
Dept: PHYSICAL THERAPY | Facility: CLINIC | Age: 69
Setting detail: THERAPIES SERIES
End: 2019-01-10
Attending: ORTHOPAEDIC SURGERY
Payer: MEDICARE

## 2019-01-10 PROCEDURE — 97110 THERAPEUTIC EXERCISES: CPT | Mod: GP | Performed by: PHYSICAL THERAPIST

## 2019-01-16 ENCOUNTER — TELEPHONE (OUTPATIENT)
Dept: FAMILY MEDICINE | Facility: CLINIC | Age: 69
End: 2019-01-16

## 2019-01-16 NOTE — TELEPHONE ENCOUNTER
Velvet notified of Dr. Vivar's instructions as noted below. She agrees with plan.  Saúl Burch RN

## 2019-01-16 NOTE — TELEPHONE ENCOUNTER
Have him stop right now. He may be taking too much and if his back is better he does not need them. Radha Vivar M.D.

## 2019-01-16 NOTE — TELEPHONE ENCOUNTER
"Dr. Vivar:   Velvet, wife, reports that since starting the robaxin;  Abram is disoriented; trouble keeping his balance. Wife reports that he has been taking robaxin 4 times a day; \"he may be taking even more because he already took 2 this morning. I have now taken possession of this pill bottle.\"   \" He says that his back is better. \"  Velvet reports regarding Abram:    Does not take diazepam    Takes morphine  Takes gabapentin  Takes acetaminophen  Takes ibuprofen    Velvet is not sure of the dosing and frequency of the above 4 medications.     Velvet is wondering if Abram can stop the robaxin or does he need to wean off of them?  Saúl Burch RN    "

## 2019-01-16 NOTE — TELEPHONE ENCOUNTER
Tried calling Velvet at [phone] and  said it was full and cannot accept messages at this time. Need to try a different time.   Saúl Burch RN

## 2019-01-16 NOTE — TELEPHONE ENCOUNTER
Reason for Call:  Other prescription    Detailed comments: Velvet wishing to speak with Dr. Vivar regarding some side effects that Abram is having with a medication.   No other information was given.  Please call and assess. Thank you..Toya Hoyos    Phone Number Patient can be reached at: Best Time: [phone]    Can we leave a detailed message on this number? YES    Call taken on 1/16/2019 at 8:45 AM by Toya Hoyos

## 2019-01-17 ENCOUNTER — HOSPITAL ENCOUNTER (OUTPATIENT)
Dept: PHYSICAL THERAPY | Facility: CLINIC | Age: 69
Setting detail: THERAPIES SERIES
End: 2019-01-17
Attending: ORTHOPAEDIC SURGERY
Payer: MEDICARE

## 2019-01-17 PROCEDURE — 97110 THERAPEUTIC EXERCISES: CPT | Mod: GP | Performed by: PHYSICAL THERAPIST

## 2019-02-15 ENCOUNTER — HEALTH MAINTENANCE LETTER (OUTPATIENT)
Age: 69
End: 2019-02-15

## 2019-03-04 DIAGNOSIS — G89.29 CHRONIC BILATERAL LOW BACK PAIN WITHOUT SCIATICA: ICD-10-CM

## 2019-03-04 DIAGNOSIS — M54.50 CHRONIC BILATERAL LOW BACK PAIN WITHOUT SCIATICA: ICD-10-CM

## 2019-03-04 NOTE — TELEPHONE ENCOUNTER
methocarbamol (ROBAXIN) 750 MG tablet      Last Written Prescription Date:  1/9/19  Last Fill Quantity: 120,   # refills: 1  Last Office Visit: 1/9/19  Future Office visit:       Routing refill request to provider for review/approval because:  Drug not on the [facility], [facility] or [facility] refill protocol or controlled substance

## 2019-03-05 NOTE — TELEPHONE ENCOUNTER
See telephone encoutner from 1/16/19:  He was having disorientation and off balance, and his back was better. Told to stop the robaxin. It looks like his wife Velvet was going to take over the pills  Please call and see what is going on.  Nguyen Lincoln PA-C

## 2019-03-05 NOTE — TELEPHONE ENCOUNTER
Spoke with Abram and he said that he has been taking 3-4 robaxin per day. He said that it does help his back feel better. He said that it only made him feel out of balance when he started taking it.  Saúl Burch RN

## 2019-03-07 RX ORDER — METHOCARBAMOL 750 MG/1
750 TABLET, FILM COATED ORAL 3 TIMES DAILY
Qty: 120 TABLET | Refills: 1 | Status: SHIPPED | OUTPATIENT
Start: 2019-03-07 | End: 2019-06-05

## 2019-03-07 NOTE — TELEPHONE ENCOUNTER
I refilled. Recommend he start trying to cut back to three times a day if he can to prevent side effects and see if this still helps his back pain as much.  He has follow up with Dr. Vivar in May  Nguyen Lincoln PA-C

## 2019-03-07 NOTE — TELEPHONE ENCOUNTER
Call placed to patient.  Voicemail message left explaining refill was sent, recommended dosing 3 times daily.  Call back number given.  Savana Alexis RN

## 2019-03-11 ENCOUNTER — TELEPHONE (OUTPATIENT)
Dept: FAMILY MEDICINE | Facility: CLINIC | Age: 69
End: 2019-03-11

## 2019-03-11 NOTE — TELEPHONE ENCOUNTER
Prior Authorization Retail Medication Request    Medication/Dose: Methocarbamol 750mg Tabs  ICD code (if different than what is on RX):  Chronic bilateral low back pain without sciatica [M54.5, G89.29]  Previously Tried and Failed:    Rationale:      Covermymeds:   Key- [id number]  Last Name- Angélica  - 1950      Pharmacy Information (if different than what is on RX)  Name:  Sammy [facility]ordano  Phone:  [phone]

## 2019-03-13 ENCOUNTER — DOCUMENTATION ONLY (OUTPATIENT)
Dept: PHYSICAL THERAPY | Facility: CLINIC | Age: 69
End: 2019-03-13

## 2019-03-13 NOTE — TELEPHONE ENCOUNTER
Prior Authorization Approval    Authorization Effective Date: 1/1/2019  Authorization Expiration Date: 3/12/2020  Medication: Methocarbamol 750mg Tabs - APPROVED  Approved Dose/Quantity: 120 FOR 40  Reference #:     Insurance Company: [employer] - Phone [phone] Fax [phone]  Expected CoPay:       CoPay Card Available:      Foundation Assistance Needed:    Which Pharmacy is filling the prescription (Not needed for infusion/clinic administered): [facility] - - [place] - [id number] [facility]  Pharmacy Notified: Yes  Patient Notified: Yes

## 2019-03-13 NOTE — PROGRESS NOTES
Outpatient Physical Therapy Discharge Note     Patient: Juan Lindsay  : 1950    Beginning/End Dates of Reporting Period:  2018 to 2019 (Total of 8 visits)    Referring Provider: Bright Gonzales    Diagnosis: S/P right total knee arthroplasty revision     Client Self Report:  (From date of last visit)  Back is feeling much  better today. Able to  concentrate on knee  exercsies again.    Objective Measurements: (From date of last visit)  ROM - 0-132    Integument - Surgical site healing  well. Mild redness  around incision unchang-  ed from previous visit    Treatment Has Consisted Of:  ROM and strengthening exercise education / Pt education regarding diagnosis / Gait training    Goals:  Pt will be independent  in HEP in order to  achieve and maintain  long term treatment  goals.  MOSTLY MET. Had difficulty doing knee exercises due to back pain    Pt will be able to  ascend and descend a  flight of stairs at home  with a reciprical gait  pattern and a handrail  assist safely NOT MET    Pt will be able to  ambulate community  distances to run errands  with a minimal increase  in LE pain 1-2/10. PARTIALLY MET. Gets pain after 15-20 minutes. Improving.    Plan:  Discharge from therapy.    Discharge:    Reason for Discharge: Patient has failed to schedule further appointments.  Medicare G-code: Patient did not attend a final scheduled session prior to discharge. Unable to determine discharge functional status.    Equipment Issued: None    Discharge Plan: Patient to continue home program.    Stewart Phillips, PT, DPT

## 2019-04-26 DIAGNOSIS — J30.2 CHRONIC SEASONAL ALLERGIC RHINITIS: ICD-10-CM

## 2019-04-26 NOTE — TELEPHONE ENCOUNTER
"Requested Prescriptions   Pending Prescriptions Disp Refills     fluticasone (FLONASE) 50 MCG/ACT nasal spray  Last Written Prescription Date:  3/27/18  Last Fill Quantity: 3,  # refills: 3   Last office visit: 1/9/2019 with prescribing provider:  maria alejandra   Future Office Visit:           Sig: Spray 1-2 sprays into both nostrils daily       Inhaled Steroids Protocol Passed - 4/26/2019 11:13 AM        Passed - Patient is age 12 or older        Passed - Recent (12 mo) or future (30 days) visit within the authorizing provider's specialty     Patient had office visit in the last 12 months or has a visit in the next 30 days with authorizing provider or within the authorizing provider's specialty.  See \"Patient Info\" tab in inbasket, or \"Choose Columns\" in Meds & Orders section of the refill encounter.              Passed - Medication is active on med list          "

## 2019-04-29 RX ORDER — FLUTICASONE PROPIONATE 50 MCG
1-2 SPRAY, SUSPENSION (ML) NASAL DAILY
Qty: 48 G | Refills: 0 | Status: SHIPPED | OUTPATIENT
Start: 2019-04-29 | End: 2019-07-19

## 2019-06-03 DIAGNOSIS — G89.29 CHRONIC BILATERAL LOW BACK PAIN WITHOUT SCIATICA: ICD-10-CM

## 2019-06-03 DIAGNOSIS — M54.50 CHRONIC BILATERAL LOW BACK PAIN WITHOUT SCIATICA: ICD-10-CM

## 2019-06-03 NOTE — TELEPHONE ENCOUNTER
methocarbamol (ROBAXIN) 750 MG tablet      Last Written Prescription Date:  3/7/19  Last Fill Quantity: 120,   # refills: 1  Last Office Visit: 1/9/19  Future Office visit:       Routing refill request to provider for review/approval because:  Drug not on the [facility], [facility] or [facility] refill protocol or controlled substance

## 2019-06-05 RX ORDER — METHOCARBAMOL 750 MG/1
750 TABLET, FILM COATED ORAL 3 TIMES DAILY
Qty: 120 TABLET | Refills: 1 | Status: SHIPPED | OUTPATIENT
Start: 2019-06-05 | End: 2019-08-27

## 2019-06-05 NOTE — TELEPHONE ENCOUNTER
Routing refill request to provider for review/approval because:  Drug not on the [facility] refill protocol   Saúl Burch RN

## 2019-07-10 NOTE — PATIENT INSTRUCTIONS
Patient Education   Personalized Prevention Plan  You are due for the preventive services outlined below.  Your care team is available to assist you in scheduling these services.  If you have already completed any of these items, please share that information with your care team to update in your medical record.  Health Maintenance Due   Topic Date Due     URINE DRUG SCREEN  1950     Zoster (Shingles) Vaccine (1 of 2) 05/05/2002     Annual Wellness Visit  05/05/2017     Pneumococcal Vaccine (1 of 2 - PCV13) 05/05/2017     AORTIC ANEURYSM SCREENING (SYSTEM ASSIGNED)  05/05/2017     Colonscopy  10/11/2018     Depression Assessment  03/17/2019     FALL RISK ASSESSMENT  03/27/2019     Diptheria Tetanus Pertussis (DTAP/TDAP/TD) Vaccine (3 - Td) 04/23/2019

## 2019-07-10 NOTE — PROGRESS NOTES
"  SUBJECTIVE:   Juan Lindsay is a 69 year old male who presents for Preventive Visit.    Are you in the first 12 months of your Medicare Part B coverage?  No    Physical Health:    In general, how would you rate your overall physical health? good    Outside of work, how many days during the week do you exercise? daily    Outside of work, approximately how many minutes a day do you exercise? He does hours work and yard work    If you drink alcohol do you typically have >3 drinks per day or >7 drinks per week? No    Do you usually eat at least 4 servings of fruit and vegetables a day, include whole grains & fiber and avoid regularly eating high fat or \"junk\" foods? NO    Do you have any problems taking medications regularly?  No    Do you have any side effects from medications? none    Needs assistance for the following daily activities: no assistance needed    Which of the following safety concerns are present in your home?  none identified     Hearing impairment: No    In the past 6 months, have you been bothered by leaking of urine? no    Mental Health:    In general, how would you rate your overall mental or emotional health? good  PHQ-2 Score:      Do you feel safe in your environment? Yes    Do you have a Health Care Directive? Yes: Patient states has Advance Directive and will bring in a copy to clinic.      Fall risk:  Fallen 2 or more times in the past year?: No  Any fall with injury in the past year?: No        Do you have sleep apnea, excessive snoring or daytime drowsiness?: no      Depression and Anxiety Follow-Up  Lexapro 30mg qd    How are you doing with your depression since your last visit? No change    How are you doing with your anxiety since your last visit?  No change    Are you having other symptoms that might be associated with depression or anxiety? No    Have you had a significant life event? No     Do you have any concerns with your use of alcohol or other drugs? No    Social History "     Tobacco Use     Smoking status: Light Tobacco Smoker     Packs/day: 0.50     Years: 25.00     Pack years: 12.50     Types: Cigarettes     Last attempt to quit: 2014     Years since quittin.8     Smokeless tobacco: Never Used   Substance Use Topics     Alcohol use: No     Drug use: No     PHQ 3/27/2018 2018 2019   PHQ-9 Total Score 6 0 3   Q9: Thoughts of better off dead/self-harm past 2 weeks Not at all Not at all Not at all     ADRY-7 SCORE 2016 3/8/2017 2018   Total Score - - -   Total Score 1 0 0     No flowsheet data found.      Suicide Assessment Five-step Evaluation and Treatment (SAFE-T)      Chronic/Recurring Back Pain Follow Up  See medication list    Where is your back pain located? (Select all that apply) Lumbar spine    How would you describe your back pain?  Burning and sharp pain. Pain is worse when standing or sitting for long periods    Where does your back pain spread? nowhere    Since your last clinic visit for back pain, how has your pain changed? Improved since loosing weight    Does your back pain interfere with your job? Not applicable    Since your last visit, have you tried any new treatment? No      - Colonoscopy last completed 10/11/2013. He is on a q 5 year schedule and is due to repeat.      Reviewed and updated as needed this visit by clinical staff  Tobacco  Allergies  Meds  Med Hx  Surg Hx  Fam Hx  Soc Hx        Reviewed and updated as needed this visit by Provider        Social History     Tobacco Use     Smoking status: Light Tobacco Smoker     Packs/day: 0.50     Years: 25.00     Pack years: 12.50     Types: Cigarettes     Last attempt to quit: 2014     Years since quittin.8     Smokeless tobacco: Never Used   Substance Use Topics     Alcohol use: No                           Current providers sharing in care for this patient include:   Patient Care Team:  Radha Vivar MD as PCP - General (Family Practice)  Rupesh Roach MD as MD  (Anesthesiology)  Robert Barnett MD as MD (Hematology & Oncology)  Miriam Sin, RN as  (Hematology & Oncology)  Radha Vivar MD as Assigned PCP    The following health maintenance items are reviewed in Epic and correct as of today:  Health Maintenance   Topic Date Due     URINE DRUG SCREEN  1950     ZOSTER IMMUNIZATION (1 of 2) 05/05/2002     MEDICARE ANNUAL WELLNESS VISIT  05/05/2017     PNEUMOCOCCAL IMMUNIZATION 65+ LOW/MEDIUM RISK (1 of 2 - PCV13) 05/05/2017     AORTIC ANEURYSM SCREENING (SYSTEM ASSIGNED)  05/05/2017     COLONOSCOPY  10/11/2018     PHQ-9  03/17/2019     FALL RISK ASSESSMENT  03/27/2019     DTAP/TDAP/TD IMMUNIZATION (3 - Td) 04/23/2019     INFLUENZA VACCINE (1) 09/01/2019     ADVANCE CARE PLANNING  01/12/2020     LIPID  08/30/2023     HEPATITIS C SCREENING  Completed     DEPRESSION ACTION PLAN  Addressed     IPV IMMUNIZATION  Aged Out     MENINGITIS IMMUNIZATION  Aged Out     BP Readings from Last 3 Encounters:   07/12/19 129/54   07/11/19 128/66   01/09/19 138/74    Wt Readings from Last 3 Encounters:   07/12/19 72.6 kg (160 lb)   07/11/19 71.4 kg (157 lb 6 oz)   01/09/19 81.6 kg (180 lb)                  Patient Active Problem List   Diagnosis     Sleep apnea     ALCOH DEP not active for 28 years     Tobacco use disorder     Allergic rhinitis due to other allergen     PAIN BACK, LOW     Anxiety state     Sedative, hypnotic or anxiolytic abuse, continuous (H)     Moderate major depression (H)     Osteopenia     Health Care Home     Obstructive sleep apnea     Iron deficiency anemia     Anemia     Advanced directives, counseling/discussion     Long QT interval     Aftercare following joint replacement surgery     Infection of prosthetic knee joint (H)     Myofascial pain     Arthralgia of shoulder     Encounter for other administrative examinations     Prepatellar bursitis     Degeneration of intervertebral disc of lumbosacral region     Past Surgical History:    Procedure Laterality Date     COLONOSCOPY  10/8/1998    Colonoscopy     SURGICAL HISTORY OF -   1999    Uvulopalatopharyngoplasty with Tonsillecotomy     SURGICAL HISTORY OF -   2003    Septoplasty     SURGICAL HISTORY OF -       L Elbow     SURGICAL HISTORY OF -   2004     L Knee Arthroscopy     SURGICAL HISTORY OF -   10/2004    L Rotator Cuff Repair     SURGICAL HISTORY OF -       Ruptured appendix       Social History     Tobacco Use     Smoking status: Light Tobacco Smoker     Packs/day: 0.50     Years: 25.00     Pack years: 12.50     Types: Cigarettes     Last attempt to quit: 2014     Years since quittin.8     Smokeless tobacco: Never Used   Substance Use Topics     Alcohol use: No     Family History   Problem Relation Age of Onset     Lipids Mother      Alzheimer Disease Mother               Anemia Mother         Questionable     Lipids Father      Cerebrovascular Disease Father      Cancer Father      Neurologic Disorder Brother         atacksia     Substance Abuse Brother      Alcohol/Drug Brother         Alcohal and Rx abuse. Suicide      Psychotic Disorder Brother      Thyroid Disease Brother          Current Outpatient Medications   Medication Sig Dispense Refill     aspirin  MG EC tablet Take 325 mg by mouth       Cholecalciferol (VITAMIN D PO) Take 1 tablet by mouth daily       Cyanocobalamin (B-12 PO) Take 1 tablet by mouth daily       Cyanocobalamin (VITAMIN B12 PO) Take 1 tablet by mouth 2 times daily Reported on 2017       escitalopram (LEXAPRO) 20 MG tablet Take 1 tablet (20 mg) by mouth daily (Patient taking differently: Take 30 mg by mouth daily ) 30 tablet 0     ferrous gluconate (FERGON) 324 (38 Fe) MG tablet Take 1 tablet (324 mg) by mouth daily (with breakfast) 100 tablet 0     fluticasone (FLONASE) 50 MCG/ACT nasal spray Spray 1-2 sprays into both nostrils daily 48 g 0     gabapentin (NEURONTIN) 600 MG tablet Take 600 mg by mouth 3 times  "daily       hydrocodone-acetaminophen (NORCO)  MG per tablet Take 2 tablets by mouth 3 times daily.       HYDROmorphone HCl (DILAUDID PO) Take by mouth every 4 hours as needed for moderate to severe pain       methocarbamol (ROBAXIN) 750 MG tablet Take 1 tablet (750 mg) by mouth 3 times daily 120 tablet 1     morphine (MS CONTIN) 15 MG 12 hr tablet Take 2 tablets (30 mg) by mouth 4 times daily       Nutritional Supplements (SENIOR MENS FORMULA OR) Take 1 tablet by mouth daily Reported on 4/26/2017       varenicline (CHANTIX MICHEAL) 0.5 MG X 11 & 1 MG X 42 tablet Take 0.5 mg tab daily for 3 days, THEN 0.5 mg tab twice daily for 4 days, THEN 1 mg twice daily. 53 tablet 0     diazepam (VALIUM) 5 MG tablet Take 5 mg by mouth as needed        ibuprofen (ADVIL/MOTRIN) 600 MG tablet Take 600 mg by mouth 2 times daily       zolpidem (AMBIEN) 10 MG tablet Take 10 mg by mouth           ROS:  Constitutional, HEENT, cardiovascular, pulmonary, gi and gu systems are negative, except as otherwise noted.    OBJECTIVE:   /66   Pulse 62   Temp 98  F (36.7  C) (Tympanic)   Resp 14   Ht 1.753 m (5' 9\")   Wt 71.4 kg (157 lb 6 oz)   BMI 23.24 kg/m   Estimated body mass index is 23.24 kg/m  as calculated from the following:    Height as of this encounter: 1.753 m (5' 9\").    Weight as of this encounter: 71.4 kg (157 lb 6 oz).  EXAM:   GENERAL: healthy, alert and no distress  NECK: no adenopathy, no asymmetry, masses, or scars and thyroid normal to palpation  RESP: lungs clear to auscultation - no rales, rhonchi or wheezes  CV: regular rate and rhythm, normal S1 S2, no S3 or S4, no murmur, click or rub, no peripheral edema and peripheral pulses strong  ABDOMEN: soft, nontender, no hepatosplenomegaly, no masses and bowel sounds normal  MS: no gross musculoskeletal defects noted, no edema    Diagnostic Test Results:  Labs reviewed in Epic    ASSESSMENT / PLAN:   1. Encounter for Medicare annual wellness exam      2. " "Degeneration of intervertebral disc of lumbosacral region    - SPINE SURGERY REFERRAL    3. Other iron deficiency anemia    - Basic metabolic panel; Future    4. CARDIOVASCULAR SCREENING; LDL GOAL LESS THAN 130    - Lipid panel reflex to direct LDL Fasting; Future    5. Screening for prostate cancer    - PSA, screen; Future    6. Screening for colon cancer    - GASTROENTEROLOGY ADULT REF PROCEDURE ONLY    7. Need for vaccination    - VACCINE ADMINISTRATION, INITIAL  - VACCINE ADMINISTRATION, EACH ADDITIONAL  - TDAP VACCINE (ADACEL)  - HC ZOSTER VACCINE RECOMBINANT ADJUVANTED IM NJX  - PNEUMOCOCCAL CONJ VACCINE 13 VALENT IM    8. Cigarette smoker    - varenicline (CHANTIX MICHEAL) 0.5 MG X 11 & 1 MG X 42 tablet; Take 0.5 mg tab daily for 3 days, THEN 0.5 mg tab twice daily for 4 days, THEN 1 mg twice daily.  Dispense: 53 tablet; Refill: 0    End of Life Planning:  Patient currently has an advanced directive: No.  I have verified the patient's ablity to prepare an advanced directive/make health care decisions.  Literature was provided to assist patient in preparing an advanced directive.    COUNSELING:  Reviewed preventive health counseling, as reflected in patient instructions       Regular exercise       Healthy diet/nutrition       Vision screening       Hearing screening    Estimated body mass index is 23.24 kg/m  as calculated from the following:    Height as of this encounter: 1.753 m (5' 9\").    Weight as of this encounter: 71.4 kg (157 lb 6 oz).    Weight management plan: Discussed healthy diet and exercise guidelines     reports that he has been smoking cigarettes.  He has a 12.50 pack-year smoking history. He has never used smokeless tobacco.  Tobacco Cessation Action Plan: Pharmacotherapies : Chantix    Appropriate preventive services were discussed with this patient, including applicable screening as appropriate for cardiovascular disease, diabetes, osteopenia/osteoporosis, and glaucoma.  As appropriate for " age/gender, discussed screening for colorectal cancer, prostate cancer, breast cancer, and cervical cancer. Checklist reviewing preventive services available has been given to the patient.    Reviewed patients plan of care and provided an AVS. The Intermediate Care Plan ( asthma action plan, low back pain action plan, and migraine action plan) for Juan meets the Care Plan requirement. This Care Plan has been established and reviewed with the Patient.    Counseling Resources:  ATP IV Guidelines  Pooled Cohorts Equation Calculator  Breast Cancer Risk Calculator  FRAX Risk Assessment  ICSI Preventive Guidelines  Dietary Guidelines for Americans, 2010  USDA's MyPlate  ASA Prophylaxis  Lung CA Screening    Dillan Randall MD  [facility]

## 2019-07-11 ENCOUNTER — TELEPHONE (OUTPATIENT)
Dept: FAMILY MEDICINE | Facility: CLINIC | Age: 69
End: 2019-07-11

## 2019-07-11 ENCOUNTER — OFFICE VISIT (OUTPATIENT)
Dept: FAMILY MEDICINE | Facility: CLINIC | Age: 69
End: 2019-07-11
Payer: MEDICARE

## 2019-07-11 VITALS
WEIGHT: 157.38 LBS | RESPIRATION RATE: 14 BRPM | SYSTOLIC BLOOD PRESSURE: 128 MMHG | DIASTOLIC BLOOD PRESSURE: 66 MMHG | TEMPERATURE: 98 F | HEART RATE: 62 BPM | HEIGHT: 69 IN | BODY MASS INDEX: 23.31 KG/M2

## 2019-07-11 DIAGNOSIS — Z12.11 SCREENING FOR COLON CANCER: ICD-10-CM

## 2019-07-11 DIAGNOSIS — Z12.5 SCREENING FOR PROSTATE CANCER: ICD-10-CM

## 2019-07-11 DIAGNOSIS — Z13.6 CARDIOVASCULAR SCREENING; LDL GOAL LESS THAN 130: ICD-10-CM

## 2019-07-11 DIAGNOSIS — Z00.00 ENCOUNTER FOR MEDICARE ANNUAL WELLNESS EXAM: Primary | ICD-10-CM

## 2019-07-11 DIAGNOSIS — D50.8 OTHER IRON DEFICIENCY ANEMIA: ICD-10-CM

## 2019-07-11 DIAGNOSIS — M51.379 DEGENERATION OF INTERVERTEBRAL DISC OF LUMBOSACRAL REGION: ICD-10-CM

## 2019-07-11 DIAGNOSIS — Z23 NEED FOR VACCINATION: ICD-10-CM

## 2019-07-11 DIAGNOSIS — F17.210 CIGARETTE SMOKER: ICD-10-CM

## 2019-07-11 PROCEDURE — 90670 PCV13 VACCINE IM: CPT | Performed by: FAMILY MEDICINE

## 2019-07-11 PROCEDURE — 90715 TDAP VACCINE 7 YRS/> IM: CPT | Performed by: FAMILY MEDICINE

## 2019-07-11 PROCEDURE — 90472 IMMUNIZATION ADMIN EACH ADD: CPT | Performed by: FAMILY MEDICINE

## 2019-07-11 PROCEDURE — G0439 PPPS, SUBSEQ VISIT: HCPCS | Performed by: FAMILY MEDICINE

## 2019-07-11 PROCEDURE — G0009 ADMIN PNEUMOCOCCAL VACCINE: HCPCS | Performed by: FAMILY MEDICINE

## 2019-07-11 PROCEDURE — 90750 HZV VACC RECOMBINANT IM: CPT | Performed by: FAMILY MEDICINE

## 2019-07-11 ASSESSMENT — PATIENT HEALTH QUESTIONNAIRE - PHQ9: SUM OF ALL RESPONSES TO PHQ QUESTIONS 1-9: 3

## 2019-07-11 ASSESSMENT — MIFFLIN-ST. JEOR: SCORE: 1479.23

## 2019-07-11 ASSESSMENT — PAIN SCALES - GENERAL: PAINLEVEL: SEVERE PAIN (6)

## 2019-07-11 NOTE — NURSING NOTE
Screening Questionnaire for Adult Immunization    Are you sick today?   No   Do you have allergies to medications, food, a vaccine component or latex?   No   Have you ever had a serious reaction after receiving a vaccination?   No   Do you have a long-term health problem with heart disease, lung disease, asthma, kidney disease, metabolic disease (e.g. diabetes), anemia, or other blood disorder?   No   Do you have cancer, leukemia, HIV/AIDS, or any other immune system problem?   No   In the past 3 months, have you taken medications that affect  your immune system, such as prednisone, other steroids, or anticancer drugs; drugs for the treatment of rheumatoid arthritis, Crohn s disease, or psoriasis; or have you had radiation treatments?   No   Have you had a seizure, or a brain or other nervous system problem?   No   During the past year, have you received a transfusion of blood or blood     products, or been given immune (gamma) globulin or antiviral drug?   No   For women: Are you pregnant or is there a chance you could become        pregnant during the next month?   No   Have you received any vaccinations in the past 4 weeks?   No     Immunization questionnaire answers were all negative.        Screening performed by Ninoska Okeefe on 7/11/2019 at 11:50 AM.

## 2019-07-11 NOTE — TELEPHONE ENCOUNTER
Prior Authorization Retail Medication Request    Medication/Dose:   ICD code (if different than what is on RX):  Cigarette smoker [F17.210]   Previously Tried and Failed:    Rationale:      Covermymeds:   Key-[id number]  Last Name- Angélica JACKSON-        Pharmacy Information (if different than what is on RX)  Name:  [facility]  Phone:  [phone]

## 2019-07-12 ENCOUNTER — HOSPITAL ENCOUNTER (EMERGENCY)
Facility: CLINIC | Age: 69
Discharge: HOME | End: 2019-07-12
Attending: EMERGENCY MEDICINE | Admitting: EMERGENCY MEDICINE
Payer: MEDICARE

## 2019-07-12 ENCOUNTER — APPOINTMENT (OUTPATIENT)
Dept: CT IMAGING | Facility: CLINIC | Age: 69
End: 2019-07-12
Attending: EMERGENCY MEDICINE
Payer: MEDICARE

## 2019-07-12 ENCOUNTER — NURSE TRIAGE (OUTPATIENT)
Dept: NURSING | Facility: CLINIC | Age: 69
End: 2019-07-12

## 2019-07-12 VITALS
OXYGEN SATURATION: 98 % | DIASTOLIC BLOOD PRESSURE: 54 MMHG | TEMPERATURE: 98.2 F | SYSTOLIC BLOOD PRESSURE: 129 MMHG | HEART RATE: 43 BPM | WEIGHT: 160 LBS | BODY MASS INDEX: 22.9 KG/M2 | HEIGHT: 70 IN | RESPIRATION RATE: 18 BRPM

## 2019-07-12 DIAGNOSIS — T88.7XXA MEDICATION SIDE EFFECTS: ICD-10-CM

## 2019-07-12 LAB
ALBUMIN SERPL-MCNC: 3.4 G/DL (ref 3.4–5)
ALP SERPL-CCNC: 73 U/L (ref 40–150)
ALT SERPL W P-5'-P-CCNC: 22 U/L (ref 0–70)
ANION GAP SERPL CALCULATED.3IONS-SCNC: 6 MMOL/L (ref 3–14)
AST SERPL W P-5'-P-CCNC: 57 U/L (ref 0–45)
BASOPHILS # BLD AUTO: 0.1 10E9/L (ref 0–0.2)
BASOPHILS NFR BLD AUTO: 0.7 %
BILIRUB SERPL-MCNC: 0.4 MG/DL (ref 0.2–1.3)
BUN SERPL-MCNC: 16 MG/DL (ref 7–30)
CALCIUM SERPL-MCNC: 8.9 MG/DL (ref 8.5–10.1)
CHLORIDE SERPL-SCNC: 107 MMOL/L (ref 94–109)
CO2 SERPL-SCNC: 27 MMOL/L (ref 20–32)
CREAT SERPL-MCNC: 1.36 MG/DL (ref 0.66–1.25)
DIFFERENTIAL METHOD BLD: ABNORMAL
EOSINOPHIL # BLD AUTO: 0 10E9/L (ref 0–0.7)
EOSINOPHIL NFR BLD AUTO: 0 %
ERYTHROCYTE [DISTWIDTH] IN BLOOD BY AUTOMATED COUNT: 13.4 % (ref 10–15)
GFR SERPL CREATININE-BSD FRML MDRD: 53 ML/MIN/{1.73_M2}
GLUCOSE SERPL-MCNC: 115 MG/DL (ref 70–99)
HCT VFR BLD AUTO: 36.9 % (ref 40–53)
HGB BLD-MCNC: 11.5 G/DL (ref 13.3–17.7)
IMM GRANULOCYTES # BLD: 0 10E9/L (ref 0–0.4)
IMM GRANULOCYTES NFR BLD: 0.2 %
LYMPHOCYTES # BLD AUTO: 0.7 10E9/L (ref 0.8–5.3)
LYMPHOCYTES NFR BLD AUTO: 7.8 %
MCH RBC QN AUTO: 30.3 PG (ref 26.5–33)
MCHC RBC AUTO-ENTMCNC: 31.2 G/DL (ref 31.5–36.5)
MCV RBC AUTO: 97 FL (ref 78–100)
MONOCYTES # BLD AUTO: 0.5 10E9/L (ref 0–1.3)
MONOCYTES NFR BLD AUTO: 5.8 %
NEUTROPHILS # BLD AUTO: 7.3 10E9/L (ref 1.6–8.3)
NEUTROPHILS NFR BLD AUTO: 85.5 %
NRBC # BLD AUTO: 0 10*3/UL
NRBC BLD AUTO-RTO: 0 /100
PLATELET # BLD AUTO: 317 10E9/L (ref 150–450)
POTASSIUM SERPL-SCNC: 3.9 MMOL/L (ref 3.4–5.3)
PROT SERPL-MCNC: 6.7 G/DL (ref 6.8–8.8)
RBC # BLD AUTO: 3.79 10E12/L (ref 4.4–5.9)
SODIUM SERPL-SCNC: 140 MMOL/L (ref 133–144)
TROPONIN I SERPL-MCNC: <0.015 UG/L (ref 0–0.04)
WBC # BLD AUTO: 8.5 10E9/L (ref 4–11)

## 2019-07-12 PROCEDURE — 99285 EMERGENCY DEPT VISIT HI MDM: CPT | Mod: 25 | Performed by: EMERGENCY MEDICINE

## 2019-07-12 PROCEDURE — 36415 COLL VENOUS BLD VENIPUNCTURE: CPT | Performed by: EMERGENCY MEDICINE

## 2019-07-12 PROCEDURE — 80053 COMPREHEN METABOLIC PANEL: CPT | Performed by: EMERGENCY MEDICINE

## 2019-07-12 PROCEDURE — 85025 COMPLETE CBC W/AUTO DIFF WBC: CPT | Performed by: EMERGENCY MEDICINE

## 2019-07-12 PROCEDURE — 93005 ELECTROCARDIOGRAM TRACING: CPT | Performed by: EMERGENCY MEDICINE

## 2019-07-12 PROCEDURE — 84484 ASSAY OF TROPONIN QUANT: CPT | Performed by: EMERGENCY MEDICINE

## 2019-07-12 PROCEDURE — 70450 CT HEAD/BRAIN W/O DYE: CPT

## 2019-07-12 PROCEDURE — 93010 ELECTROCARDIOGRAM REPORT: CPT | Mod: Z6 | Performed by: EMERGENCY MEDICINE

## 2019-07-12 ASSESSMENT — MIFFLIN-ST. JEOR: SCORE: 1507.01

## 2019-07-12 NOTE — ED NOTES
Up ambulating with pulse oximeter, sats remain 93-96%. Pulse remains 50-66. More awake and following commands.

## 2019-07-12 NOTE — DISCHARGE INSTRUCTIONS
I believe that your excessive sleepiness is caused by the diazepam and zolpidem you have been taking.  Return to the emergency department if you have fevers, chest pain, difficulty breathing, or other concerns.  Otherwise discussed with your primary care doctor over cutting back on these medications that will make you very sleepy.

## 2019-07-12 NOTE — ED PROVIDER NOTES
History     Chief Complaint   Patient presents with     Fall     DIZZY AND WEAK SINCE YESTERDAY, FELL A FEW TIMES DURING THE NIGHT. NO WEAKNESS. DIFFICULTY WITH WAKING THIS AM.      Dizziness     HPI  Juan Lindsay is a 69 year old male who presents for fatigue and difficulty waking.  History obtained from the patient and his spouse.  The patient was feeling well yesterday and drove himself to a doctor's appointment where he was evaluated and had some shots.  Review of his chart shows that was a primary care visit.  He felt very tired last night and slept on the couch.  Sometime overnight the patient may have fallen in the bathroom as there were several things that were knocked over.  He does remember this, says he felt somewhat dizzy and fell over.  He denies loss of consciousness.  He does not feel dizzy now.  He denies headache, nausea, vomiting, diarrhea, bloody stools.  No chest pain, difficulty breathing, hemoptysis, abdominal pain, dysuria, or rash.  He is complaining of some mild pain in his shoulders from where he had shots yesterday but otherwise no complaints.      Review his records shows he is on MS Contin and hydrocodone as well as methocarbamol.  He has had prior prescriptions for diazepam but says he does not take this currently.  However on review of the [place] prescription monitoring database I see he has been prescribed 100 tablets of diazepam 5 mg since 5/28/2019, approximately 6 weeks.  He is also been prescribed zolpidem over this timeframe and denies taking any of this.    Allergies:  No Known Allergies    Problem List:    Patient Active Problem List    Diagnosis Date Noted     Infection of prosthetic knee joint (H) 04/29/2017     Priority: Medium     Aftercare following joint replacement surgery 03/17/2017     Priority: Medium     Prepatellar bursitis 03/08/2017     Priority: Medium     Long QT interval 08/03/2016     Priority: Medium     Myofascial pain 05/08/2015     Priority: Medium      Advanced directives, counseling/discussion 01/12/2015     Priority: Medium     Advance Care Planning:   ACP Review and Resources Provided:  Reviewed chart for advance care plan.  Juan Lindsay has no plan or code status on file however states presence of ACP document. Copy requested. Added by Keisha Hernandez on 1/12/2015             Anemia 11/03/2014     Priority: Medium     Iron deficiency anemia 04/30/2014     Priority: Medium     Encounter for other administrative examinations 03/04/2014     Priority: Medium     Overview:   [facility] with Dr. Rupesh Roach       Obstructive sleep apnea 07/01/2013     Priority: Medium     Arthralgia of shoulder 07/23/2012     Priority: Medium     Osteopenia 10/19/2011     Priority: Medium     10/19/2011 - recommended 5oo mg calcium with vit D tid. recheck in two years.       Moderate major depression (H) 10/12/2011     Priority: Medium     Sedative, hypnotic or anxiolytic abuse, continuous (H) 03/04/2010     Priority: Medium     He came to see me one week ago complaining of significant increase in his anxiety. He told me that he had been off the Valium 5 mg twice a day since last May.  This was not true.  He had just seen Dr. Mallory at [facility] on February 8, 2010 and had been prescribed Valium 5 mg twice a day. During the visit I told him I would give him Ativan 1 mg up to three times a day for one months for love #90 pills.  I gave him a referral to psychiatry.  I will not be prescribing narcotics or tranquilizers in the future.       Anxiety state 09/18/2006     Priority: Medium     Problem list name updated by automated process. Provider to review       PAIN BACK, LOW 06/19/2006     Priority: Medium     Chronic - seen at [facility] 1/13/11 - continue same MS contin, norco, ambien dosing  F/u in March       Sleep apnea      Priority: Medium     Problem list name updated by automated process. Provider to review       ALCOH DEP not active for 28 years       Priority: Medium     Tobacco use disorder      Priority: Medium     Allergic rhinitis due to other allergen      Priority: Medium     Degeneration of intervertebral disc of lumbosacral region 05/25/2006     Priority: Medium     Health Care Home 06/27/2013     Priority: Low     EMERGENCY CARE PLAN  June 27, 2013: No current Care Coordination follow up planned. Please refer if Care Coordination services are needed.    Presenting Problem Signs and Symptoms Treatment Plan   Questions or concerns   during clinic hours   I will call my clinic directly:  [address] [address]  [phone].    Questions or concerns outside clinic hours   I will call the 24 hour nurse line at   [phone] or [facility].   Need to schedule an appointment   I will call the 24 hour scheduling team at [phone] or my clinic directly at [phone].    Same day treatment     I will call my clinic first, nurse line if after hours, urgent care and express care if needed.   Clinic care coordination services (regular clinic hours)     I will call a clinic care coordinator directly:     Festus Osman RN  Mon, Tues, Fri - [phone]  Wed, Thurs - [phone]    Ania Moyer :    [phone]    Or call my clinic at [phone] and ask to speak with care coordination.   Crisis Services: Behavioral or Mental Health  [facility] 24 Hour Phone Line  [phone]    [facility] 24 Hour Crisis Services  [phone]    [facility] (Behavioral Health Providers) Network [phone]    [facility]   [phone]       Emergency treatment -- Immediately    CAll 911             Past Medical History:    Past Medical History:   Diagnosis Date     Allergic rhinitis due to other allergen      Depressive disorder, not elsewhere classified      Other and unspecified alcohol dependence, unspecified drinking behavior      Tobacco use disorder      Unspecified sleep apnea        Past Surgical  History:    Past Surgical History:   Procedure Laterality Date     COLONOSCOPY  10/8/1998    Colonoscopy     SURGICAL HISTORY OF -   1999    Uvulopalatopharyngoplasty with Tonsillecotomy     SURGICAL HISTORY OF -   2003    Septoplasty     SURGICAL HISTORY OF -       L Elbow     SURGICAL HISTORY OF -   2004     L Knee Arthroscopy     SURGICAL HISTORY OF -   10/2004    L Rotator Cuff Repair     SURGICAL HISTORY OF -       Ruptured appendix       Family History:    Family History   Problem Relation Age of Onset     Lipids Mother      Alzheimer Disease Mother               Anemia Mother         Questionable     Lipids Father      Cerebrovascular Disease Father      Cancer Father      Neurologic Disorder Brother         atacksia     Substance Abuse Brother      Alcohol/Drug Brother         Alcohal and Rx abuse. Suicide 2007     Psychotic Disorder Brother      Thyroid Disease Brother        Social History:  Marital Status:   [2]  Social History     Tobacco Use     Smoking status: Light Tobacco Smoker     Packs/day: 0.50     Years: 25.00     Pack years: 12.50     Types: Cigarettes     Last attempt to quit: 2014     Years since quittin.8     Smokeless tobacco: Never Used   Substance Use Topics     Alcohol use: No     Drug use: No        Medications:      aspirin  MG EC tablet   Cholecalciferol (VITAMIN D PO)   Cyanocobalamin (B-12 PO)   Cyanocobalamin (VITAMIN B12 PO)   diazepam (VALIUM) 5 MG tablet   escitalopram (LEXAPRO) 20 MG tablet   ferrous gluconate (FERGON) 324 (38 Fe) MG tablet   fluticasone (FLONASE) 50 MCG/ACT nasal spray   gabapentin (NEURONTIN) 600 MG tablet   hydrocodone-acetaminophen (NORCO)  MG per tablet   HYDROmorphone HCl (DILAUDID PO)   ibuprofen (ADVIL/MOTRIN) 600 MG tablet   methocarbamol (ROBAXIN) 750 MG tablet   morphine (MS CONTIN) 15 MG 12 hr tablet   Nutritional Supplements (SENIOR MENS FORMULA OR)   varenicline (CHANTIX MICHEAL) 0.5 MG X 11 & 1 MG  "X 42 tablet   zolpidem (AMBIEN) 10 MG tablet         Review of Systems  Pertinent positives and negatives listed in the HPI, all other systems reviewed and are negative.    Physical Exam   BP: 121/71  Pulse: (!) 49  Heart Rate: 50  Temp: 98.2  F (36.8  C)  Resp: 18  Height: 177.8 cm (5' 10\")  Weight: 72.6 kg (160 lb)  SpO2: 99 %      Physical Exam   Constitutional: He is oriented to person, place, and time. He appears well-developed and well-nourished. He appears distressed.   HENT:   Head: Normocephalic and atraumatic.   Right Ear: External ear normal.   Left Ear: External ear normal.   Nose: Nose normal.   Eyes: Conjunctivae are normal. No scleral icterus.   Neck: Normal range of motion.   Cardiovascular: Normal rate and regular rhythm.   Pulmonary/Chest: Effort normal. No stridor. No respiratory distress.   Abdominal: Soft. He exhibits no distension.   Neurological: He is alert and oriented to person, place, and time. No cranial nerve deficit. He exhibits normal muscle tone.   Falls asleep frequently on exam but wakes up easily.   Skin: Skin is warm and dry. He is not diaphoretic.   Psychiatric: He has a normal mood and affect. His behavior is normal.   Nursing note and vitals reviewed.      ED Course        Procedures               EKG Interpretation:      Interpreted by Huang Lino  Time reviewed: 0907  Symptoms at time of EKG: Fatigue   Rhythm: sinus bradycardia  Rate: 47  Axis: normal  Ectopy: none  Conduction: normal  ST Segments/ T Waves: No ST-T wave changes  Q Waves: none  Comparison to prior: Unchanged    Clinical Impression: normal EKG    Critical Care time:  none               Results for orders placed or performed during the hospital encounter of 07/12/19 (from the past 24 hour(s))   CBC with platelets differential   Result Value Ref Range    WBC 8.5 4.0 - 11.0 10e9/L    RBC Count 3.79 (L) 4.4 - 5.9 10e12/L    Hemoglobin 11.5 (L) 13.3 - 17.7 g/dL    Hematocrit 36.9 (L) 40.0 - 53.0 %    MCV 97 " 78 - 100 fl    MCH 30.3 26.5 - 33.0 pg    MCHC 31.2 (L) 31.5 - 36.5 g/dL    RDW 13.4 10.0 - 15.0 %    Platelet Count 317 150 - 450 10e9/L    Diff Method Automated Method     % Neutrophils 85.5 %    % Lymphocytes 7.8 %    % Monocytes 5.8 %    % Eosinophils 0.0 %    % Basophils 0.7 %    % Immature Granulocytes 0.2 %    Nucleated RBCs 0 0 /100    Absolute Neutrophil 7.3 1.6 - 8.3 10e9/L    Absolute Lymphocytes 0.7 (L) 0.8 - 5.3 10e9/L    Absolute Monocytes 0.5 0.0 - 1.3 10e9/L    Absolute Eosinophils 0.0 0.0 - 0.7 10e9/L    Absolute Basophils 0.1 0.0 - 0.2 10e9/L    Abs Immature Granulocytes 0.0 0 - 0.4 10e9/L    Absolute Nucleated RBC 0.0    Comprehensive metabolic panel   Result Value Ref Range    Sodium 140 133 - 144 mmol/L    Potassium 3.9 3.4 - 5.3 mmol/L    Chloride 107 94 - 109 mmol/L    Carbon Dioxide 27 20 - 32 mmol/L    Anion Gap 6 3 - 14 mmol/L    Glucose 115 (H) 70 - 99 mg/dL    Urea Nitrogen 16 7 - 30 mg/dL    Creatinine 1.36 (H) 0.66 - 1.25 mg/dL    GFR Estimate 53 (L) >60 mL/min/[1.73_m2]    GFR Estimate If Black 62 >60 mL/min/[1.73_m2]    Calcium 8.9 8.5 - 10.1 mg/dL    Bilirubin Total 0.4 0.2 - 1.3 mg/dL    Albumin 3.4 3.4 - 5.0 g/dL    Protein Total 6.7 (L) 6.8 - 8.8 g/dL    Alkaline Phosphatase 73 40 - 150 U/L    ALT 22 0 - 70 U/L    AST 57 (H) 0 - 45 U/L   Troponin I   Result Value Ref Range    Troponin I ES <0.015 0.000 - 0.045 ug/L   CT Head w/o Contrast    Narrative    CT SCAN OF THE HEAD WITHOUT CONTRAST   7/12/2019 9:58 AM     HISTORY: Altered mental status.    TECHNIQUE:  Axial images of the head and coronal reformations without  IV contrast material. Radiation dose for this scan was reduced using  automated exposure control, adjustment of the mA and/or kV according  to patient size, or iterative reconstruction technique.    COMPARISON: None.    FINDINGS: There is no evidence of intracranial hemorrhage, mass, acute  infarct or anomaly. The ventricles are normal in size, shape  and  configuration. The brain parenchyma and subarachnoid spaces are normal  for the patient's age. There is mild age commensurate global volume  loss that does not demonstrate a lobar predilection.    Trace scattered paranasal sinus mucosal thickening without fluid  levels. The bony calvarium and bones of the skull base appear intact.       Impression    IMPRESSION:   No evidence of acute intracranial hemorrhage, mass, or  herniation.      AKUA NEFF MD       Medications - No data to display    Assessments & Plan (with Medical Decision Making)   69-year-old male who presents for fatigue.  Heart rate 50, temperature is 98.2  F, SPO2 is 99% on room air.  He falls asleep frequently on examination but is able to the woken up easily and upon arousal is normal talkative, not slurring his speech, breathing comfortably on room air.  No signs of acute injury.  Hemoglobin is 11.5 which is stable for the patient.  White blood cell count 8.5.  EKG shows sinus bradycardia, no signs of ischemia.  CT of the head obtained, images reviewed independently as well as radiology read reviewed, no signs of intracranial hemorrhage or mass.  On recheck the patient is sleepy still but awakes easily.  I discussed with him what I found on review of his prescription monitoring database.  After I told him about the known prescriptions for diazepam and zolpidem he did admit to taking those medications.  He says he only takes them as prescribed.  I believe this are the cause of the patient's sleepiness.  He was watched in the emergency department is feeling better now, ambulating without difficulty, breathing comfortably, and tolerating oral intake.  He is safe to discharge with instructions to discuss the excessive sleepiness with his primary care doctor, return if worse, otherwise follow-up in clinic as needed.  The patient is in agreement with this plan.    I have reviewed the nursing notes.    I have reviewed the findings, diagnosis, plan  and need for follow up with the patient.          Medication List      There are no discharge medications for this visit.         Final diagnoses:   Medication side effects       7/12/2019   [facility] EMERGENCY DEPARTMENT     Huang Lino MD  07/12/19 4696

## 2019-07-12 NOTE — ED NOTES
Pt here following a fall this morning. Wife states he has fallen several times since yesterday. The patient falls asleep mid sentence. Does not answer questions fully before falling asleep. No obvious injury noted.

## 2019-07-12 NOTE — TELEPHONE ENCOUNTER
"Wife calls for Patient who is present and gives verbal permission to talk with Caller.  Reports Patient has fallen within the past hour.  Describes as \"lethargic and dizzy.\" States he has weakness and a loss of balance.  Currently reports Patient has slurred speech and states Patient is \"disoriented.\"  Caller is asking if he received new or different medications yesterday at his clinic visit.     Protocol-  Neurologic Deficit- Adult  Care advice reviewed.   Disposition- Call 911   Caller states understanding of the recommended disposition.   Agrees to call 911 now.  Advised to call back if further questions or concerns.     GILBERT Garcia RN  [facility]     Reason for Disposition    Difficult to awaken or acting confused (e.g., disoriented, slurred speech)    Protocols used: NEUROLOGIC DEFICIT-A-OH    "

## 2019-07-12 NOTE — ED AVS SNAPSHOT
[facility] Emergency Department  [address] [address]  Phone:  [phone]  Fax:  [phone]                                    Juan Lindsay   MRN: [id number]    Department:  [facility] Emergency Department   Date of Visit:  7/12/2019           After Visit Summary Signature Page    I have received my discharge instructions, and my questions have been answered. I have discussed any challenges I see with this plan with the nurse or doctor.    ..........................................................................................................................................  Patient/Patient Representative Signature      ..........................................................................................................................................  Patient Representative Print Name and Relationship to Patient    ..................................................               ................................................  Date                                   Time    ..........................................................................................................................................  Reviewed by Signature/Title    ...................................................              ..............................................  Date                                               Time          22EPIC Rev 08/18

## 2019-07-13 ENCOUNTER — NURSE TRIAGE (OUTPATIENT)
Dept: NURSING | Facility: CLINIC | Age: 69
End: 2019-07-13

## 2019-07-13 NOTE — TELEPHONE ENCOUNTER
He's sorry for not being able to notify anyone that he can't make it to Monday's colonoscopy. He will call Monday to reschedule that.  Nadia Clifford RN-[facility]

## 2019-07-19 DIAGNOSIS — J30.2 CHRONIC SEASONAL ALLERGIC RHINITIS: ICD-10-CM

## 2019-07-19 RX ORDER — FLUTICASONE PROPIONATE 50 MCG
1-2 SPRAY, SUSPENSION (ML) NASAL DAILY
Qty: 48 G | Refills: 1 | Status: SHIPPED | OUTPATIENT
Start: 2019-07-19 | End: 2020-04-27

## 2019-07-19 NOTE — TELEPHONE ENCOUNTER
"Requested Prescriptions   Pending Prescriptions Disp Refills     fluticasone (FLONASE) 50 MCG/ACT nasal spray  Last Written Prescription Date:  4/19/19  Last Fill Quantity: 48g,  # refills: 0   Last office visit: 7/11/2019 with prescribing provider:  maria alejandra   Future Office Visit:     48 g 0     Sig: Spray 1-2 sprays into both nostrils daily       Inhaled Steroids Protocol Passed - 7/19/2019  1:56 PM        Passed - Patient is age 12 or older        Passed - Recent (12 mo) or future (30 days) visit within the authorizing provider's specialty     Patient had office visit in the last 12 months or has a visit in the next 30 days with authorizing provider or within the authorizing provider's specialty.  See \"Patient Info\" tab in inbasket, or \"Choose Columns\" in Meds & Orders section of the refill encounter.              Passed - Medication is active on med list          "

## 2019-07-19 NOTE — TELEPHONE ENCOUNTER
Prior Authorization Approval    Authorization Effective Date: 4/20/2019  Authorization Expiration Date: 1/15/2020  Medication: chantix- APPROVED   Approved Dose/Quantity:   Reference #:     Insurance Company: [employer] - Phone [phone] Fax [phone]  Expected CoPay:       CoPay Card Available:      Foundation Assistance Needed:    Which Pharmacy is filling the prescription (Not needed for infusion/clinic administered): [facility] - - [place] - [id number] [facility]  Pharmacy Notified: Yes  Patient Notified: Comment:  **Instructed pharmacy to notify patient when script is ready to /ship.**

## 2019-07-19 NOTE — TELEPHONE ENCOUNTER
Central Prior Authorization Team  Phone: [phone]    PA Initiation    Medication: chantix  Insurance Company: [employer] - Phone [phone] Fax [phone]  Pharmacy Filling the Rx: [facility] - - [place] - [id number] [facility]  Filling Pharmacy Phone: [phone]  Filling Pharmacy Fax:    Start Date: 7/19/2019

## 2019-07-31 DIAGNOSIS — Z13.6 CARDIOVASCULAR SCREENING; LDL GOAL LESS THAN 130: ICD-10-CM

## 2019-07-31 DIAGNOSIS — Z12.5 SCREENING FOR PROSTATE CANCER: ICD-10-CM

## 2019-07-31 DIAGNOSIS — D50.8 OTHER IRON DEFICIENCY ANEMIA: ICD-10-CM

## 2019-07-31 LAB
ANION GAP SERPL CALCULATED.3IONS-SCNC: 7 MMOL/L (ref 3–14)
BUN SERPL-MCNC: 16 MG/DL (ref 7–30)
CALCIUM SERPL-MCNC: 8.8 MG/DL (ref 8.5–10.1)
CHLORIDE SERPL-SCNC: 107 MMOL/L (ref 94–109)
CHOLEST SERPL-MCNC: 145 MG/DL
CO2 SERPL-SCNC: 27 MMOL/L (ref 20–32)
CREAT SERPL-MCNC: 1.01 MG/DL (ref 0.66–1.25)
GFR SERPL CREATININE-BSD FRML MDRD: 76 ML/MIN/{1.73_M2}
GLUCOSE SERPL-MCNC: 125 MG/DL (ref 70–99)
HDLC SERPL-MCNC: 60 MG/DL
LDLC SERPL CALC-MCNC: 59 MG/DL
NONHDLC SERPL-MCNC: 85 MG/DL
POTASSIUM SERPL-SCNC: 3.6 MMOL/L (ref 3.4–5.3)
PSA SERPL-ACNC: 0.04 UG/L (ref 0–4)
SODIUM SERPL-SCNC: 141 MMOL/L (ref 133–144)
TRIGL SERPL-MCNC: 132 MG/DL

## 2019-07-31 PROCEDURE — G0103 PSA SCREENING: HCPCS | Performed by: FAMILY MEDICINE

## 2019-07-31 PROCEDURE — 36415 COLL VENOUS BLD VENIPUNCTURE: CPT | Performed by: FAMILY MEDICINE

## 2019-07-31 PROCEDURE — 80048 BASIC METABOLIC PNL TOTAL CA: CPT | Performed by: FAMILY MEDICINE

## 2019-07-31 PROCEDURE — 80061 LIPID PANEL: CPT | Performed by: FAMILY MEDICINE

## 2019-08-05 ENCOUNTER — HOSPITAL ENCOUNTER (EMERGENCY)
Facility: CLINIC | Age: 69
Discharge: HOME | End: 2019-08-05
Attending: PHYSICIAN ASSISTANT | Admitting: PHYSICIAN ASSISTANT
Payer: MEDICARE

## 2019-08-05 ENCOUNTER — ALLIED HEALTH/NURSE VISIT (OUTPATIENT)
Dept: FAMILY MEDICINE | Facility: CLINIC | Age: 69
End: 2019-08-05
Payer: MEDICARE

## 2019-08-05 VITALS
OXYGEN SATURATION: 97 % | BODY MASS INDEX: 22.19 KG/M2 | RESPIRATION RATE: 16 BRPM | TEMPERATURE: 98.1 F | WEIGHT: 155 LBS | HEIGHT: 70 IN | DIASTOLIC BLOOD PRESSURE: 73 MMHG | SYSTOLIC BLOOD PRESSURE: 132 MMHG

## 2019-08-05 DIAGNOSIS — S41.112A LACERATION OF ARM, LEFT, INITIAL ENCOUNTER: Primary | ICD-10-CM

## 2019-08-05 DIAGNOSIS — S61.512A LACERATION OF LEFT WRIST, INITIAL ENCOUNTER: ICD-10-CM

## 2019-08-05 PROCEDURE — 12001 RPR S/N/AX/GEN/TRNK 2.5CM/<: CPT | Performed by: PHYSICIAN ASSISTANT

## 2019-08-05 PROCEDURE — 99213 OFFICE O/P EST LOW 20 MIN: CPT | Mod: 25 | Performed by: PHYSICIAN ASSISTANT

## 2019-08-05 PROCEDURE — 99207 ZZC NO CHARGE NURSE ONLY: CPT

## 2019-08-05 PROCEDURE — 12001 RPR S/N/AX/GEN/TRNK 2.5CM/<: CPT | Mod: Z6 | Performed by: PHYSICIAN ASSISTANT

## 2019-08-05 PROCEDURE — G0463 HOSPITAL OUTPT CLINIC VISIT: HCPCS | Performed by: PHYSICIAN ASSISTANT

## 2019-08-05 ASSESSMENT — MIFFLIN-ST. JEOR: SCORE: 1484.33

## 2019-08-05 NOTE — PROGRESS NOTES
Pt walked in and had cut his wrist.  Wound is covered with cloth and secured with duct tape, pressure drsg.  No visible bleeding coming from or through the dressing.  Pt is walking/talking fine.  States he feels fine to drive.  Pt will go to [facility] ER for eval/treat.  Aidan

## 2019-08-05 NOTE — ED AVS SNAPSHOT
[facility] Emergency Department  [address] [address]  Phone:  [phone]  Fax:  [phone]                                    Juan Lindsay   MRN: [id number]    Department:  [facility] Emergency Department   Date of Visit:  8/5/2019           After Visit Summary Signature Page    I have received my discharge instructions, and my questions have been answered. I have discussed any challenges I see with this plan with the nurse or doctor.    ..........................................................................................................................................  Patient/Patient Representative Signature      ..........................................................................................................................................  Patient Representative Print Name and Relationship to Patient    ..................................................               ................................................  Date                                   Time    ..........................................................................................................................................  Reviewed by Signature/Title    ...................................................              ..............................................  Date                                               Time          22EPIC Rev 08/18

## 2019-08-05 NOTE — ED PROVIDER NOTES
"  History     Chief Complaint   Patient presents with     Laceration     to left wrist 1\", bleeding controlled with bandage     HPI  Juan Lindsay is a 69 year old right hand dominant male who presents to the urgent care with concern over laceration to his left wrist which occurred just prior to arrival.  Patient was attempting to cut open stand when his medical cotton something eventually it slipped resulting in puncturing the volar surface of his left wrist.  He complains of minimal pain in the area.  No concern for foreign body embedded in the wound.  He states concerned that he did have some pulsatile bleeding initially which is now controlled and has pain that radiates up his forearm.  He denies any concern for foreign body embedded in the wound.  No distal numbness or paresthesias.  His tetanus vaccine is up-to-date.    Allergies:  No Known Allergies    Problem List:    Patient Active Problem List    Diagnosis Date Noted     Infection of prosthetic knee joint (H) 04/29/2017     Priority: Medium     Aftercare following joint replacement surgery 03/17/2017     Priority: Medium     Prepatellar bursitis 03/08/2017     Priority: Medium     Long QT interval 08/03/2016     Priority: Medium     Myofascial pain 05/08/2015     Priority: Medium     Advanced directives, counseling/discussion 01/12/2015     Priority: Medium     Advance Care Planning:   ACP Review and Resources Provided:  Reviewed chart for advance care plan.  Juan Lindsay has no plan or code status on file however states presence of ACP document. Copy requested. Added by Keisha Hernandez on 1/12/2015             Anemia 11/03/2014     Priority: Medium     Iron deficiency anemia 04/30/2014     Priority: Medium     Encounter for other administrative examinations 03/04/2014     Priority: Medium     Overview:   [facility] with Dr. Rupesh Roach       Obstructive sleep apnea 07/01/2013     Priority: Medium     Arthralgia of shoulder 07/23/2012     Priority: " Medium     Osteopenia 10/19/2011     Priority: Medium     10/19/2011 - recommended 5oo mg calcium with vit D tid. recheck in two years.       Moderate major depression (H) 10/12/2011     Priority: Medium     Sedative, hypnotic or anxiolytic abuse, continuous (H) 03/04/2010     Priority: Medium     He came to see me one week ago complaining of significant increase in his anxiety. He told me that he had been off the Valium 5 mg twice a day since last May.  This was not true.  He had just seen Dr. Mallory at [facility] on February 8, 2010 and had been prescribed Valium 5 mg twice a day. During the visit I told him I would give him Ativan 1 mg up to three times a day for one months for love #90 pills.  I gave him a referral to psychiatry.  I will not be prescribing narcotics or tranquilizers in the future.       Anxiety state 09/18/2006     Priority: Medium     Problem list name updated by automated process. Provider to review       PAIN BACK, LOW 06/19/2006     Priority: Medium     Chronic - seen at [facility] 1/13/11 - continue same MS contin, norco, ambien dosing  F/u in March       Sleep apnea      Priority: Medium     Problem list name updated by automated process. Provider to review       ALCOH DEP not active for 28 years      Priority: Medium     Tobacco use disorder      Priority: Medium     Allergic rhinitis due to other allergen      Priority: Medium     Degeneration of intervertebral disc of lumbosacral region 05/25/2006     Priority: Medium     Health Care Home 06/27/2013     Priority: Low     EMERGENCY CARE PLAN  June 27, 2013: No current Care Coordination follow up planned. Please refer if Care Coordination services are needed.    Presenting Problem Signs and Symptoms Treatment Plan   Questions or concerns   during clinic hours   I will call my clinic directly:  [facility]  [address] [place] [id number] [phone].    Questions or concerns outside clinic hours   I will  call the 24 hour nurse line at   [phone] or [phone].   Need to schedule an appointment   I will call the 24 hour scheduling team at [phone] or my clinic directly at [phone].    Same day treatment     I will call my clinic first, nurse line if after hours, urgent care and express care if needed.   Clinic care coordination services (regular clinic hours)     I will call a clinic care coordinator directly:     Festus Osman RN  Mon, Tues, Fri - [phone]  Wed, Thurs - [phone]    Ania Moyer, :    [phone]    Or call my clinic at [phone] and ask to speak with care coordination.   Crisis Services: Behavioral or Mental Health  [facility] 24 Hour Phone Line  [phone]    [facility] 24 Hour Crisis Services  [phone]    [facility] (Behavioral Health Providers) Network [phone]    [facility]   [phone]       Emergency treatment -- Immediately    [facility]All 911             Past Medical History:    Past Medical History:   Diagnosis Date     Allergic rhinitis due to other allergen      Depressive disorder, not elsewhere classified      Other and unspecified alcohol dependence, unspecified drinking behavior      Tobacco use disorder      Unspecified sleep apnea        Past Surgical History:    Past Surgical History:   Procedure Laterality Date     COLONOSCOPY  10/8/1998    Colonoscopy     SURGICAL HISTORY OF -   1999    Uvulopalatopharyngoplasty with Tonsillecotomy     SURGICAL HISTORY OF -   2003    Septoplasty     SURGICAL HISTORY OF -       L Elbow     SURGICAL HISTORY OF -   2004     L Knee Arthroscopy     SURGICAL HISTORY OF -   10/2004    L Rotator Cuff Repair     SURGICAL HISTORY OF -       Ruptured appendix       Family History:    Family History   Problem Relation Age of Onset     Lipids Mother      Alzheimer Disease Mother          2010     Anemia Mother         Questionable     Lipids Father      Cerebrovascular Disease Father  "     Cancer Father      Neurologic Disorder Brother         atacksia     Substance Abuse Brother      Alcohol/Drug Brother         Alcohal and Rx abuse. Suicide 2007     Psychotic Disorder Brother      Thyroid Disease Brother        Social History:  Marital Status:   [2]  Social History     Tobacco Use     Smoking status: Light Tobacco Smoker     Packs/day: 0.50     Years: 25.00     Pack years: 12.50     Types: Cigarettes     Last attempt to quit: 2014     Years since quittin.9     Smokeless tobacco: Never Used   Substance Use Topics     Alcohol use: No     Drug use: No        Medications:      aspirin  MG EC tablet   Cholecalciferol (VITAMIN D PO)   Cyanocobalamin (B-12 PO)   Cyanocobalamin (VITAMIN B12 PO)   diazepam (VALIUM) 5 MG tablet   escitalopram (LEXAPRO) 20 MG tablet   ferrous gluconate (FERGON) 324 (38 Fe) MG tablet   fluticasone (FLONASE) 50 MCG/ACT nasal spray   gabapentin (NEURONTIN) 600 MG tablet   hydrocodone-acetaminophen (NORCO)  MG per tablet   HYDROmorphone HCl (DILAUDID PO)   ibuprofen (ADVIL/MOTRIN) 600 MG tablet   methocarbamol (ROBAXIN) 750 MG tablet   morphine (MS CONTIN) 15 MG 12 hr tablet   Nutritional Supplements (SENIOR MENS FORMULA OR)   varenicline (CHANTIX MICHEAL) 0.5 MG X 11 & 1 MG X 42 tablet   zolpidem (AMBIEN) 10 MG tablet     Review of Systems  NTEGUMENTARY/SKIN: POSITIVE for laceration left wrist NEGATIVE for ecchymosis, worrisome rashes or lesions   MUSCULOSKELETAL: POSITIVE  for minimal left wrist discomfort at site of laceration only and NEGATIVE for other concerning arthralgias or myalgias   NEURO: NEGATIVE for numbness, paresthesias   Physical Exam   BP: 132/73  Heart Rate: 63  Temp: 98.1  F (36.7  C)  Resp: 16  Height: 177.8 cm (5' 10\")  Weight: 70.3 kg (155 lb)  SpO2: 97 %  Physical Exam   Constitutional: He is oriented to person, place, and time. He appears well-developed and well-nourished. No distress.   Cardiovascular:   Pulses:       Radial " pulses are 2+ on the left side.   Musculoskeletal:        Left elbow: Normal.        Left wrist: He exhibits swelling and laceration. He exhibits normal range of motion, no tenderness, no bony tenderness, no effusion, no crepitus and no deformity.        Left hand: Normal.   Neurological: He is alert and oriented to person, place, and time. No sensory deficit.   Skin: Skin is warm and dry. Capillary refill takes less than 2 seconds. Laceration noted. No abrasion and no rash noted. No erythema.   Bleeding from laceration site was initially well controlled after cleaning minimal oozing present, no pulsatile bleeding     ED Course        [facility]    Laceration repair  Date/Time: 8/5/2019 3:05 PM  Performed by: Alison Carballo PA-C  Authorized by: Alison Carballo PA-C     ED EVALUATION:      I have performed an Emergency Department Evaluation including taking a history and physical examination, this evaluation will be documented in the electronic medical record for this ED encounter.    UNIVERSAL PROTOCOL   Site Marked: NA  Prior Images Obtained and Reviewed:  NA  Required items: Required blood products, implants, devices and special equipment available    Patient identity confirmed:  Verbally with patient, arm band, provided demographic data and hospital-assigned identification number  Patient was reevaluated immediately before administering moderate or deep sedation or anesthesia  Confirmation Checklist:  Patient's identity using two indicators, relevant allergies, procedure was appropriate and matched the consent or emergent situation and correct equipment/implants were available  Time out: Immediately prior to the procedure a time out was called    Preparation: Patient was prepped and draped in usual sterile fashion      ANESTHESIA (see MAR for exact dosages):     Anesthesia method:  Local infiltration    Local anesthetic:  Lidocaine 1% w/o epi    SEDATION    Patient Sedated: No     LACERATION DETAILS     Location: left wrist.    Length (cm):  2    REPAIR TYPE:     Repair type:  Simple      EXPLORATION:     Contaminated: no      TREATMENT:     Area cleansed with:  Hibiclens    Amount of cleaning:  Standard    Irrigation solution:  Sterile saline    Irrigation volume:  100    Visualized foreign bodies/material removed: no      SKIN REPAIR     Repair method:  Sutures    Suture size:  4-0    Suture technique:  Simple interrupted    Number of sutures:  4    APPROXIMATION     Approximation:  Close    POST-PROCEDURE DETAILS     Dressing:  Antibiotic ointment (pressure dressing)      PROCEDURE   Patient Tolerance:  Patient tolerated the procedure well with no immediate complications    Time of Sedation in Minutes by Physician:  0          Critical Care time:  none            No results found for this or any previous visit (from the past 24 hour(s)).  Medications - No data to display    Assessments & Plan (with Medical Decision Making)     I have reviewed the nursing notes.    I have reviewed the findings, diagnosis, plan and need for follow up with the patient.          Medication List      There are no discharge medications for this visit.       Final diagnoses:   Laceration of left wrist, initial encounter     69-year-old male presents to the urgent care with concern of a laceration to his left wrist which occurred prior to arrival.  He had stable vital signs upon arrival.  Physical exam findings as described above.  After discussing versus benefits of primary versus secondary closure, patient agreed to proceed with primary closure with sutures.  He tolerated placement of four 4-0 nylon sutures without immediate complications.  He was discharged home stable with instructions to follow-up with primary care provider for suture removal in 5 to 7 days.  Suture care instructions, signs of infection, worrisome reasons to return to the ER/UC sooner discussed.    Disclaimer: This note consists of symbols  derived from keyboarding, dictation, and/or voice recognition software. As a result, there may be errors in the script that have gone undetected.  Please consider this when interpreting information found in the chart.    8/5/2019   [facility] EMERGENCY DEPARTMENT     Alison Carballo PA-C  08/05/19 1279

## 2019-08-08 ENCOUNTER — TELEPHONE (OUTPATIENT)
Dept: FAMILY MEDICINE | Facility: CLINIC | Age: 69
End: 2019-08-08

## 2019-08-13 ENCOUNTER — ALLIED HEALTH/NURSE VISIT (OUTPATIENT)
Dept: FAMILY MEDICINE | Facility: CLINIC | Age: 69
End: 2019-08-13
Payer: MEDICARE

## 2019-08-13 DIAGNOSIS — S41.112A LACERATION OF ARM, LEFT, INITIAL ENCOUNTER: Primary | ICD-10-CM

## 2019-08-13 PROCEDURE — 99207 ZZC NO CHARGE NURSE ONLY: CPT

## 2019-08-13 NOTE — PROGRESS NOTES
Suture removal:     Date sutures applied: 8/5/19         Where (setting) in which they applied:ER visit, [facility] ED.    Description:  Type: 4 sutures  Location: L wrist.    History:    Cause of laceration: Knife slipped.    Accompanying Signs & Symptoms: (staff: if yes-describe)  Redness: no  Warmth: no  Drainage: no  Still bleeding: no  Fevers: no    Last tetanus shot: last tetanus booster within 10 years    Well approximated. Steri strips applied for support - wound in high use/stretch area.    KPavelRn

## 2019-08-27 DIAGNOSIS — G89.29 CHRONIC BILATERAL LOW BACK PAIN WITHOUT SCIATICA: ICD-10-CM

## 2019-08-27 DIAGNOSIS — M54.50 CHRONIC BILATERAL LOW BACK PAIN WITHOUT SCIATICA: ICD-10-CM

## 2019-08-27 RX ORDER — METHOCARBAMOL 750 MG/1
TABLET, FILM COATED ORAL
Qty: 120 TABLET | Refills: 1 | Status: SHIPPED | OUTPATIENT
Start: 2019-08-27 | End: 2019-12-16

## 2019-08-27 NOTE — TELEPHONE ENCOUNTER
Routing refill request to provider for review/approval because:  Drug not on the [facility] refill protocol   Seen at [facility] on 8/2/19    Mahi BAER RN

## 2019-08-27 NOTE — TELEPHONE ENCOUNTER
METHOCARBAMOL 750 MG TABLET      Last Written Prescription Date:  6/5/19  Last Fill Quantity: 120,   # refills: 1  Last Office Visit: 7/11/19  Future Office visit:       Routing refill request to provider for review/approval because:  Drug not on the [facility], [facility] or [facility] refill protocol or controlled substance

## 2019-09-04 ENCOUNTER — ANESTHESIA EVENT (OUTPATIENT)
Dept: GASTROENTEROLOGY | Facility: CLINIC | Age: 69
End: 2019-09-04
Payer: MEDICARE

## 2019-09-04 ASSESSMENT — LIFESTYLE VARIABLES: TOBACCO_USE: 1

## 2019-09-04 NOTE — ANESTHESIA PREPROCEDURE EVALUATION
Anesthesia Pre-Procedure Evaluation    Patient: Juan Lindsay   MRN: [id number] : 1950          Preoperative Diagnosis: screening    Procedure(s):  COLONOSCOPY    Past Medical History:   Diagnosis Date     Allergic rhinitis due to other allergen      Depressive disorder, not elsewhere classified      Other and unspecified alcohol dependence, unspecified drinking behavior      Tobacco use disorder      Unspecified sleep apnea      Past Surgical History:   Procedure Laterality Date     COLONOSCOPY  10/8/1998    Colonoscopy     SURGICAL HISTORY OF -   1999    Uvulopalatopharyngoplasty with Tonsillecotomy     SURGICAL HISTORY OF -   2003    Septoplasty     SURGICAL HISTORY OF -       L Elbow     SURGICAL HISTORY OF -   2004     L Knee Arthroscopy     SURGICAL HISTORY OF -   10/2004    L Rotator Cuff Repair     SURGICAL HISTORY OF -       Ruptured appendix       Anesthesia Evaluation     . Pt has had prior anesthetic. Type: General and MAC           ROS/MED HX    ENT/Pulmonary:     (+)sleep apnea, allergic rhinitis, tobacco use, Current use 1 packs/day  doesn't use CPAP , . .    Neurologic: Comment: Lymes      Cardiovascular: Comment: Long QT interval     (+) ----. : . . . :. . Previous cardiac testing date:results:date: results:ECG reviewed date:19 results:Marked SB (47) date: results:          METS/Exercise Tolerance:  >4 METS   Hematologic:     (+) Anemia, -      Musculoskeletal:   (+)  other musculoskeletal- osteopenia; maofascial pain; shoulder pain; bursitis; DDD      GI/Hepatic:     (+) Other GI/Hepatic hx of ETOH abuse in remission; continuous sedative/anxiolytic abuse      (-) liver disease   Renal/Genitourinary:  - ROS Renal section negative       Endo:  - neg endo ROS       Psychiatric:     (+) psychiatric history anxiety and depression      Infectious Disease:  - neg infectious disease ROS       Malignancy:      - no malignancy   Other: Comment: Controlled substance agreement  "  (+) H/O Chronic Pain,                        Physical Exam  Normal systems: cardiovascular and pulmonary    Airway   Mallampati: II    Dental   (+) lower dentures and upper dentures    Cardiovascular       Pulmonary             Lab Results   Component Value Date    WBC 8.5 07/12/2019    HGB 11.5 (L) 07/12/2019    HCT 36.9 (L) 07/12/2019     07/12/2019    SED 56 (H) 03/08/2017     07/31/2019    POTASSIUM 3.6 07/31/2019    CHLORIDE 107 07/31/2019    CO2 27 07/31/2019    BUN 16 07/31/2019    CR 1.01 07/31/2019     (H) 07/31/2019    MARY 8.8 07/31/2019    ALBUMIN 3.4 07/12/2019    PROTTOTAL 6.7 (L) 07/12/2019    ALT 22 07/12/2019    AST 57 (H) 07/12/2019    ALKPHOS 73 07/12/2019    BILITOTAL 0.4 07/12/2019    PTT 28 09/18/2003    INR 0.97 09/18/2003    TSH 1.62 11/06/2014       Preop Vitals  BP Readings from Last 3 Encounters:   08/05/19 132/73   07/12/19 129/54   07/11/19 128/66    Pulse Readings from Last 3 Encounters:   07/12/19 (!) 43   07/11/19 62   01/09/19 72      Resp Readings from Last 3 Encounters:   08/05/19 16   07/12/19 18   07/11/19 14    SpO2 Readings from Last 3 Encounters:   08/05/19 97%   07/12/19 98%   08/03/18 97%      Temp Readings from Last 1 Encounters:   08/05/19 36.7  C (98.1  F) (Oral)    Ht Readings from Last 1 Encounters:   08/05/19 1.778 m (5' 10\")      Wt Readings from Last 1 Encounters:   08/05/19 70.3 kg (155 lb)    Estimated body mass index is 22.24 kg/m  as calculated from the following:    Height as of 8/5/19: 1.778 m (5' 10\").    Weight as of 8/5/19: 70.3 kg (155 lb).       Anesthesia Plan      History & Physical Review  History and physical reviewed and following examination; no interval change.    ASA Status:  3 .    NPO Status:  > 6 hours    Plan for MAC Reason for MAC:  Deep or markedly invasive procedure (G8)    Aware of aspiration risk.      Postoperative Care      Consents  Anesthetic plan, risks, benefits and alternatives discussed with:  Patient..  "                Svaana Prado, HESHAM CRNA

## 2019-09-05 ENCOUNTER — ANESTHESIA (OUTPATIENT)
Dept: GASTROENTEROLOGY | Facility: CLINIC | Age: 69
End: 2019-09-05
Payer: MEDICARE

## 2019-09-05 ENCOUNTER — HOSPITAL ENCOUNTER (OUTPATIENT)
Facility: CLINIC | Age: 69
Discharge: HOME | End: 2019-09-05
Attending: SURGERY | Admitting: SURGERY
Payer: MEDICARE

## 2019-09-05 VITALS
HEART RATE: 62 BPM | HEIGHT: 70 IN | TEMPERATURE: 97.9 F | RESPIRATION RATE: 16 BRPM | DIASTOLIC BLOOD PRESSURE: 76 MMHG | BODY MASS INDEX: 22.9 KG/M2 | WEIGHT: 160 LBS | SYSTOLIC BLOOD PRESSURE: 122 MMHG | OXYGEN SATURATION: 96 %

## 2019-09-05 LAB — COLONOSCOPY: NORMAL

## 2019-09-05 PROCEDURE — 25000128 H RX IP 250 OP 636: Performed by: NURSE ANESTHETIST, CERTIFIED REGISTERED

## 2019-09-05 PROCEDURE — 45378 DIAGNOSTIC COLONOSCOPY: CPT | Performed by: SURGERY

## 2019-09-05 PROCEDURE — G0121 COLON CA SCRN NOT HI RSK IND: HCPCS | Performed by: SURGERY

## 2019-09-05 PROCEDURE — 25000125 ZZHC RX 250: Performed by: NURSE ANESTHETIST, CERTIFIED REGISTERED

## 2019-09-05 PROCEDURE — G0105 COLORECTAL SCRN; HI RISK IND: HCPCS | Performed by: SURGERY

## 2019-09-05 PROCEDURE — 37000008 ZZH ANESTHESIA TECHNICAL FEE, 1ST 30 MIN: Performed by: SURGERY

## 2019-09-05 PROCEDURE — 25800030 ZZH RX IP 258 OP 636: Performed by: NURSE ANESTHETIST, CERTIFIED REGISTERED

## 2019-09-05 RX ORDER — GLYCOPYRROLATE 0.2 MG/ML
INJECTION, SOLUTION INTRAMUSCULAR; INTRAVENOUS PRN
Status: DISCONTINUED | OUTPATIENT
Start: 2019-09-05 | End: 2019-09-05

## 2019-09-05 RX ORDER — ONDANSETRON 2 MG/ML
4 INJECTION INTRAMUSCULAR; INTRAVENOUS
Status: DISCONTINUED | OUTPATIENT
Start: 2019-09-05 | End: 2019-09-05 | Stop reason: HOSPADM

## 2019-09-05 RX ORDER — SODIUM CHLORIDE, SODIUM LACTATE, POTASSIUM CHLORIDE, CALCIUM CHLORIDE 600; 310; 30; 20 MG/100ML; MG/100ML; MG/100ML; MG/100ML
INJECTION, SOLUTION INTRAVENOUS CONTINUOUS
Status: DISCONTINUED | OUTPATIENT
Start: 2019-09-05 | End: 2019-09-05 | Stop reason: HOSPADM

## 2019-09-05 RX ORDER — LIDOCAINE 40 MG/G
CREAM TOPICAL
Status: DISCONTINUED | OUTPATIENT
Start: 2019-09-05 | End: 2019-09-05 | Stop reason: HOSPADM

## 2019-09-05 RX ORDER — ONDANSETRON 2 MG/ML
4 INJECTION INTRAMUSCULAR; INTRAVENOUS ONCE
Status: COMPLETED | OUTPATIENT
Start: 2019-09-05 | End: 2019-09-05

## 2019-09-05 RX ORDER — PROPOFOL 10 MG/ML
INJECTION, EMULSION INTRAVENOUS PRN
Status: DISCONTINUED | OUTPATIENT
Start: 2019-09-05 | End: 2019-09-05

## 2019-09-05 RX ORDER — PROPOFOL 10 MG/ML
INJECTION, EMULSION INTRAVENOUS CONTINUOUS PRN
Status: DISCONTINUED | OUTPATIENT
Start: 2019-09-05 | End: 2019-09-05

## 2019-09-05 RX ADMIN — PROPOFOL 30 MG: 10 INJECTION, EMULSION INTRAVENOUS at 08:18

## 2019-09-05 RX ADMIN — GLYCOPYRROLATE 0.2 MG: 0.2 INJECTION, SOLUTION INTRAMUSCULAR; INTRAVENOUS at 08:19

## 2019-09-05 RX ADMIN — LIDOCAINE HYDROCHLORIDE 1 ML: 10 INJECTION, SOLUTION EPIDURAL; INFILTRATION; INTRACAUDAL; PERINEURAL at 07:56

## 2019-09-05 RX ADMIN — PROPOFOL 40 MG: 10 INJECTION, EMULSION INTRAVENOUS at 08:19

## 2019-09-05 RX ADMIN — PROPOFOL 30 MG: 10 INJECTION, EMULSION INTRAVENOUS at 08:20

## 2019-09-05 RX ADMIN — PROPOFOL 200 MCG/KG/MIN: 10 INJECTION, EMULSION INTRAVENOUS at 08:19

## 2019-09-05 RX ADMIN — ONDANSETRON 4 MG: 2 INJECTION INTRAMUSCULAR; INTRAVENOUS at 07:56

## 2019-09-05 RX ADMIN — SODIUM CHLORIDE, POTASSIUM CHLORIDE, SODIUM LACTATE AND CALCIUM CHLORIDE: 600; 310; 30; 20 INJECTION, SOLUTION INTRAVENOUS at 07:55

## 2019-09-05 ASSESSMENT — MIFFLIN-ST. JEOR: SCORE: 1507.01

## 2019-09-05 NOTE — ANESTHESIA CARE TRANSFER NOTE
Patient: Juan Lindsay    Procedure(s):  COLONOSCOPY    Diagnosis: screening  Diagnosis Additional Information: No value filed.    Anesthesia Type:   MAC     Note:  Airway :Room Air  Patient transferred to:Phase II  Handoff Report: Identifed the Patient, Identified the Reponsible Provider, Reviewed the pertinent medical history, Discussed the surgical course, Reviewed Intra-OP anesthesia mangement and issues during anesthesia, Set expectations for post-procedure period and Allowed opportunity for questions and acknowledgement of understanding      Vitals: (Last set prior to Anesthesia Care Transfer)    CRNA VITALS  9/5/2019 0810 - 9/5/2019 0843      9/5/2019             Pulse:  48  (Abnormal)     Ht Rate:  48  (Abnormal)     SpO2:  100 %                Electronically Signed By: Jay Rivera CRNA, HESHAM PATEL  September 5, 2019  8:43 AM

## 2019-09-05 NOTE — ANESTHESIA POSTPROCEDURE EVALUATION
Patient: Juan Lindsay    Procedure(s):  COLONOSCOPY    Diagnosis:screening  Diagnosis Additional Information: No value filed.    Anesthesia Type:  MAC    Note:  Anesthesia Post Evaluation    Patient location during evaluation: Phase 2 and Bedside  Patient participation: Able to fully participate in evaluation  Level of consciousness: awake  Pain management: adequate  Airway patency: patent  Cardiovascular status: acceptable  Respiratory status: acceptable  Hydration status: acceptable  PONV: none     Anesthetic complications: None          Last vitals:  Vitals:    09/05/19 0731   BP: (!) 145/94   Resp: 16   Temp: 36.6  C (97.9  F)   SpO2: 99%         Electronically Signed By: Jay Rivera CRNA, HESHAM PATEL  September 5, 2019  8:44 AM

## 2019-09-05 NOTE — H&P
"69 year old year old male here for colonoscopy for screening.    Patient Active Problem List   Diagnosis     Sleep apnea     ALCOH DEP not active for 28 years     Tobacco use disorder     Allergic rhinitis due to other allergen     PAIN BACK, LOW     Anxiety state     Sedative, hypnotic or anxiolytic abuse, continuous (H)     Moderate major depression (H)     Osteopenia     Health Care Home     Obstructive sleep apnea     Iron deficiency anemia     Anemia     Advanced directives, counseling/discussion     Long QT interval     Aftercare following joint replacement surgery     Infection of prosthetic knee joint (H)     Myofascial pain     Arthralgia of shoulder     Encounter for other administrative examinations     Prepatellar bursitis     Degeneration of intervertebral disc of lumbosacral region       Past Medical History:   Diagnosis Date     Allergic rhinitis due to other allergen      Depressive disorder, not elsewhere classified      Other and unspecified alcohol dependence, unspecified drinking behavior      Tobacco use disorder      Unspecified sleep apnea        Past Surgical History:   Procedure Laterality Date     COLONOSCOPY  10/8/1998    Colonoscopy     SURGICAL HISTORY OF -   7/12/1999    Uvulopalatopharyngoplasty with Tonsillecotomy     SURGICAL HISTORY OF -   9/2003    Septoplasty     SURGICAL HISTORY OF -   2002    L Elbow     SURGICAL HISTORY OF -   4/2004     L Knee Arthroscopy     SURGICAL HISTORY OF -   10/2004    L Rotator Cuff Repair     SURGICAL HISTORY OF - 19996    Ruptured appendix       @[facility]@    No current outpatient medications on file.       No Known Allergies    Pt reports that he has been smoking cigarettes.  He has a 12.50 pack-year smoking history. He has never used smokeless tobacco. He reports that he does not drink alcohol or use drugs.    Exam:  BP (!) 145/94   Temp 97.9  F (36.6  C) (Oral)   Resp 16   Ht 1.778 m (5' 10\")   Wt 72.6 kg (160 lb)   SpO2 99%   BMI 22.96 kg/m  "     Awake, Alert OX3  Lungs - CTA bilaterally  CV - RRR, no murmurs, distal pulses intact  Abd - soft, non-distended, non-tender, +BS  Extr - No cyanosis or edema    A/P 69 year old year old male in need of colonoscopy for screening. Risks, benefits, alternatives, and complications were discussed including the possibility of perforation and the patient agreed to proceed    René Luna MD

## 2019-09-17 DIAGNOSIS — F17.210 CIGARETTE SMOKER: ICD-10-CM

## 2019-09-17 NOTE — TELEPHONE ENCOUNTER
"CHANTIX 0.5 (11)-1 TAB DS PK      Last Written Prescription Date:  7/11/19  Last Fill Quantity: 53,   # refills: 0  Last Office Visit: 7/11/19  Future Office visit:       Requested Prescriptions   Pending Prescriptions Disp Refills     varenicline (CHANTIX STARTING MONTH MICHEAL) 0.5 MG X 11 & 1 MG X 42 tablet [Pharmacy Med Name: CHANTIX 0.5 (11)-1 TAB DS PK] 53 tablet 0     Sig: Take 0.5 mg tab daily for 3 days, THEN 0.5 mg tab twice daily for 4 days, THEN 1 mg twice daily.       Partial Cholinergic Nicotinic Agonist Agents Passed - 9/17/2019 10:13 AM        Passed - Blood pressure under 140/90 in past 12 months     BP Readings from Last 3 Encounters:   09/05/19 122/76   08/05/19 132/73   07/12/19 129/54                 Passed - Recent (12 mo) or future (30 days) visit within the authorizing provider's specialty     Patient had office visit in the last 12 months or has a visit in the next 30 days with authorizing provider or within the authorizing provider's specialty.  See \"Patient Info\" tab in inbasket, or \"Choose Columns\" in Meds & Orders section of the refill encounter.              Passed - Medication is active on med list        Passed - Patient is 18 years of age or older          "

## 2019-09-28 ENCOUNTER — HEALTH MAINTENANCE LETTER (OUTPATIENT)
Age: 69
End: 2019-09-28

## 2019-10-02 ENCOUNTER — IMMUNIZATION (OUTPATIENT)
Dept: FAMILY MEDICINE | Facility: CLINIC | Age: 69
End: 2019-10-02
Payer: MEDICARE

## 2019-10-02 PROCEDURE — G0008 ADMIN INFLUENZA VIRUS VAC: HCPCS

## 2019-10-02 PROCEDURE — 90662 IIV NO PRSV INCREASED AG IM: CPT

## 2019-12-10 ENCOUNTER — TELEPHONE (OUTPATIENT)
Dept: FAMILY MEDICINE | Facility: CLINIC | Age: 69
End: 2019-12-10

## 2019-12-10 DIAGNOSIS — F17.210 CIGARETTE SMOKER: ICD-10-CM

## 2019-12-10 NOTE — TELEPHONE ENCOUNTER
Reason for Call:  Other prescription    Detailed comments: Abram is requesting refill on his chantix.  Completed his last script, but he started smoking again.  Serious about quitting this time.  Please call and assess. Thank you..Toya Hoyos    Phone Number Patient can be reached at: Home number on file [phone] (home)    Best Time: any time    Can we leave a detailed message on this number? YES if no answer @ home can call cell.      Call taken on 12/10/2019 at 8:58 AM by Toya Hoyos

## 2019-12-16 DIAGNOSIS — M54.50 CHRONIC BILATERAL LOW BACK PAIN WITHOUT SCIATICA: ICD-10-CM

## 2019-12-16 DIAGNOSIS — G89.29 CHRONIC BILATERAL LOW BACK PAIN WITHOUT SCIATICA: ICD-10-CM

## 2019-12-16 NOTE — TELEPHONE ENCOUNTER
methocarbamol (ROBAXIN) 750 MG tablet      Last Written Prescription Date:  8/27/19  Last Fill Quantity: 120,   # refills: 1  Last Office Visit: [facility] Office visit:       Routing refill request to provider for review/approval because:  Drug not on the [facility], [facility] or [facility] refill protocol or controlled substance

## 2019-12-17 RX ORDER — METHOCARBAMOL 750 MG/1
TABLET, FILM COATED ORAL
Qty: 120 TABLET | Refills: 1 | Status: SHIPPED | OUTPATIENT
Start: 2019-12-17 | End: 2020-02-17

## 2019-12-17 NOTE — TELEPHONE ENCOUNTER
Routing refill request to provider for review/approval because:  Drug not on the [facility] refill protocol   Savana Alexis RN

## 2020-02-02 DIAGNOSIS — F17.210 CIGARETTE SMOKER: ICD-10-CM

## 2020-02-03 NOTE — TELEPHONE ENCOUNTER
"CHANTIX 0.5 (11)-1 TAB DS PK      Last Written Prescription Date:  12/11/19  Last Fill Quantity: 53,   # refills: 1  Last Office Visit: 7/11/19  Future Office visit:       Requested Prescriptions   Pending Prescriptions Disp Refills     varenicline (CHANTIX STARTING MONTH MICHEAL) 0.5 MG X 11 & 1 MG X 42 tablet [Pharmacy Med Name: CHANTIX 0.5 (11)-1 TAB DS PK] 53 tablet 1     Sig: Take 0.5 mg tab daily for 3 days, THEN 0.5 mg tab twice daily for 4 days, THEN 1 mg twice daily.       Partial Cholinergic Nicotinic Agonist Agents Passed - 2/2/2020  8:00 AM        Passed - Blood pressure under 140/90 in past 12 months     BP Readings from Last 3 Encounters:   09/05/19 122/76   08/05/19 132/73   07/12/19 129/54                 Passed - Recent (12 mo) or future (30 days) visit within the authorizing provider's specialty     Patient has had an office visit with the authorizing provider or a provider within the authorizing providers department within the previous 12 mos or has a future within next 30 days. See \"Patient Info\" tab in inbasket, or \"Choose Columns\" in Meds & Orders section of the refill encounter.              Passed - Medication is active on med list        Passed - Patient is 18 years of age or older          "

## 2020-02-15 DIAGNOSIS — M54.50 CHRONIC BILATERAL LOW BACK PAIN WITHOUT SCIATICA: ICD-10-CM

## 2020-02-15 DIAGNOSIS — G89.29 CHRONIC BILATERAL LOW BACK PAIN WITHOUT SCIATICA: ICD-10-CM

## 2020-02-17 RX ORDER — METHOCARBAMOL 750 MG/1
TABLET, FILM COATED ORAL
Qty: 120 TABLET | Refills: 1 | Status: SHIPPED | OUTPATIENT
Start: 2020-02-17 | End: 2020-05-11

## 2020-02-17 NOTE — TELEPHONE ENCOUNTER
METHOCARBAMOL 750 MG TABLET      Last Written Prescription Date:  12/17/19  Last Fill Quantity: 120,   # refills: 1  Last Office Visit: 7/11/19  Future Office visit:       Routing refill request to provider for review/approval because:  Drug not on the [facility], [facility] or [facility] refill protocol or controlled substance

## 2020-02-18 ENCOUNTER — TELEPHONE (OUTPATIENT)
Dept: FAMILY MEDICINE | Facility: CLINIC | Age: 70
End: 2020-02-18

## 2020-02-18 NOTE — TELEPHONE ENCOUNTER
Prior Authorization Retail Medication Request    Medication/Dose:   ICD code (if different than what is on RX):  Chronic bilateral low back pain without sciatica [M54.5, G89.29]  Previously Tried and Failed:    Rationale:      Insurance Name:  [employer] Part D  Insurance ID:  [id number]      Pharmacy Information (if different than what is on RX)  Name:  Sammy Giordano  Phone:  [phone]

## 2020-02-20 NOTE — TELEPHONE ENCOUNTER
Prior Authorization Approval    Medication: Methocarbamol 750mg tab - APPROVED was approved on 2/20/2020  Effective: 11/22/2019 to 2/19/2021  Reference #:    Approved Dose/Quantity:   Insurance Company: [employer] - Phone [phone] Fax [phone]  Expected CoPay:    Pharm[facility] Filling the Rx: [facility] - - [place] - [id number] [facility]  Pharmacy Notified: Yes  Patient Notified: Comment:  **Instructed pharmacy to notify patient when script is ready to /ship.**

## 2020-02-20 NOTE — TELEPHONE ENCOUNTER
PA Initiation    Medication: Methocarbamol 750mg tab -   Insurance Company: [employer] Blue - Phone [phone] Fax [phone]  Pharmacy Filling the Rx: [facility] - - [place] - [id number] [facility]  Filling Pharmacy Phone: [phone]  Filling Pharmacy Fax: [phone]  Start Date: 2/20/2020

## 2020-02-29 ENCOUNTER — MYC MEDICAL ADVICE (OUTPATIENT)
Dept: FAMILY MEDICINE | Facility: CLINIC | Age: 70
End: 2020-02-29

## 2020-03-03 DIAGNOSIS — Z79.899 ENCOUNTER FOR LONG-TERM (CURRENT) USE OF OTHER MEDICATIONS: Primary | ICD-10-CM

## 2020-03-05 ENCOUNTER — OFFICE VISIT (OUTPATIENT)
Dept: FAMILY MEDICINE | Facility: CLINIC | Age: 70
End: 2020-03-05
Payer: MEDICARE

## 2020-03-05 VITALS
SYSTOLIC BLOOD PRESSURE: 174 MMHG | OXYGEN SATURATION: 95 % | WEIGHT: 170 LBS | HEIGHT: 70 IN | HEART RATE: 50 BPM | BODY MASS INDEX: 24.34 KG/M2 | TEMPERATURE: 97.9 F | DIASTOLIC BLOOD PRESSURE: 83 MMHG

## 2020-03-05 DIAGNOSIS — J01.90 ACUTE SINUSITIS WITH SYMPTOMS > 10 DAYS: ICD-10-CM

## 2020-03-05 DIAGNOSIS — Z79.899 ENCOUNTER FOR LONG-TERM (CURRENT) USE OF OTHER MEDICATIONS: ICD-10-CM

## 2020-03-05 DIAGNOSIS — R03.0 ELEVATED BP WITHOUT DIAGNOSIS OF HYPERTENSION: ICD-10-CM

## 2020-03-05 DIAGNOSIS — Z79.899 ENCOUNTER FOR LONG-TERM (CURRENT) USE OF MEDICATIONS: Primary | ICD-10-CM

## 2020-03-05 LAB — TSH SERPL DL<=0.005 MIU/L-ACNC: 1.17 MU/L (ref 0.4–4)

## 2020-03-05 PROCEDURE — 99214 OFFICE O/P EST MOD 30 MIN: CPT | Performed by: FAMILY MEDICINE

## 2020-03-05 PROCEDURE — 36415 COLL VENOUS BLD VENIPUNCTURE: CPT | Performed by: PSYCHIATRY & NEUROLOGY

## 2020-03-05 PROCEDURE — 84443 ASSAY THYROID STIM HORMONE: CPT | Performed by: PSYCHIATRY & NEUROLOGY

## 2020-03-05 PROCEDURE — 93000 ELECTROCARDIOGRAM COMPLETE: CPT | Performed by: FAMILY MEDICINE

## 2020-03-05 RX ORDER — HYDROXYZINE HYDROCHLORIDE 25 MG/1
50 TABLET, FILM COATED ORAL 3 TIMES DAILY PRN
COMMUNITY
Start: 2020-03-02

## 2020-03-05 RX ORDER — IPRATROPIUM BROMIDE 42 UG/1
2 SPRAY, METERED NASAL 4 TIMES DAILY
Qty: 15 ML | Refills: 1 | Status: SHIPPED | OUTPATIENT
Start: 2020-03-05 | End: 2020-04-08

## 2020-03-05 ASSESSMENT — MIFFLIN-ST. JEOR: SCORE: 1552.36

## 2020-03-05 NOTE — PROGRESS NOTES
Subjective     Juan Lindsay is a 69 year old male who presents to clinic today for the following health issues:    HPI            Symptoms: cc Present Absent Comment   Fever/Chills   x    Fatigue  x     Headache  x     Muscle or Body  Aches   x    Eye Irritation   x    Sneezing   x    Nasal Brooks/Drg x      Sinus Pressure/Pain x   Worse at night when lays down and in the mornings.    Dental pain   x    Sore Throat  x     Swollen Glands   x    Ear Pain/Fullness  x  Plugged at times    Cough x      Wheeze   x    Chest Discomfort   x    Shortness of breath   x    Abdominal pain   x    Emesis    x    Diarrhea   x    Other   x      Symptom duration:  all winter   Symptom severity:     Treatments tried:  antihistamine, nasal spray     Contacts:  no     ekg done at psych request normal   Has order for EKG and labs from [facility].   Has been having more drainage   Post nasal drainage   Has been using the afrin daily   We discussed stopping the afrin willl start the atrovent        BP Readings from Last 6 Encounters:   03/05/20 (!) 174/83   09/05/19 122/76   08/05/19 132/73   07/12/19 129/54   07/11/19 128/66   01/09/19 138/74       Wt Readings from Last 5 Encounters:   03/05/20 77.1 kg (170 lb)   09/05/19 72.6 kg (160 lb)   08/05/19 70.3 kg (155 lb)   07/12/19 72.6 kg (160 lb)   07/11/19 71.4 kg (157 lb 6 oz)         Patient Active Problem List   Diagnosis     Sleep apnea     ALCOH DEP not active for 28 years     Tobacco use disorder     Allergic rhinitis due to other allergen     PAIN BACK, LOW     Anxiety state     Sedative, hypnotic or anxiolytic abuse, continuous (H)     Moderate major depression (H)     Osteopenia     Health Care Home     Obstructive sleep apnea     Iron deficiency anemia     Anemia     Advanced directives, counseling/discussion     Long QT interval     Aftercare following joint replacement surgery     Infection of prosthetic knee joint (H)     Myofascial pain     Arthralgia of shoulder      Encounter for other administrative examinations     Prepatellar bursitis     Degeneration of intervertebral disc of lumbosacral region     Past Surgical History:   Procedure Laterality Date     ABDOMEN SURGERY  1998     APPENDECTOMY  1998     COLONOSCOPY  10/8/1998    Colonoscopy     COLONOSCOPY N/A 2019    Procedure: COLONOSCOPY;  Surgeon: René Luna MD;  Location: [facility]     ORTHOPEDIC SURGERY  2018     SOFT TISSUE SURGERY      left elbow     SURGICAL HISTORY OF -   1999    Uvulopalatopharyngoplasty with Tonsillecotomy     SURGICAL HISTORY OF -   2003    Septoplasty     SURGICAL HISTORY OF -       L Elbow     SURGICAL HISTORY OF -   2004     L Knee Arthroscopy     SURGICAL HISTORY OF -   10/2004    L Rotator Cuff Repair     SURGICAL HISTORY OF -       Ruptured appendix       Social History     Tobacco Use     Smoking status: Light Tobacco Smoker     Packs/day: 0.50     Years: 25.00     Pack years: 12.50     Types: Cigarettes     Start date: 1980     Last attempt to quit: 2014     Years since quittin.5     Smokeless tobacco: Never Used     Tobacco comment: can't get my mind to agree with my feelings   Substance Use Topics     Alcohol use: No     Family History   Problem Relation Age of Onset     Lipids Mother      Alzheimer Disease Mother               Anemia Mother         Questionable     Hyperlipidemia Mother      Cerebrovascular Disease Mother      Anxiety Disorder Mother      Unknown/Adopted Mother      Lipids Father      Cerebrovascular Disease Father          - mouth cancer     Cancer Father      Neurologic Disorder Brother         atacksia     Substance Abuse Brother         Ataxia,      Cerebrovascular Disease Brother         Ataxia, alchol abuse     Alcohol/Drug Brother         Alcohal and Rx abuse. Suicide 2007     Psychotic Disorder Brother      Thyroid Disease Brother      Cerebrovascular Disease Brother         suicide     Anxiety Disorder  "Brother      Substance Abuse Brother         suicide     Thyroid Disease Brother              Reviewed and updated as needed this visit by Provider         Review of Systems   ROS COMP: Constitutional, HEENT, cardiovascular, pulmonary, GI, , musculoskeletal, neuro, skin, endocrine and psych systems are negative, except as otherwise noted.      Objective    BP (!) 174/83   Pulse 50   Temp 97.9  F (36.6  C) (Tympanic)   Ht 1.778 m (5' 10\")   Wt 77.1 kg (170 lb)   SpO2 95%   BMI 24.39 kg/m    Body mass index is 24.39 kg/m .  Physical Exam   GENERAL: healthy, alert and no distress  EYES: Eyes grossly normal to inspection, PERRL and conjunctivae and sclerae normal  HENT: right ear: occluded with wax, nose and mouth without ulcers or lesions, oropharynx clear and oral mucous membranes moist  NECK: no adenopathy, no asymmetry, masses, or scars and thyroid normal to palpation  RESP: lungs clear to auscultation - no rales, rhonchi or wheezes  CV: regular rate and rhythm, normal S1 S2, no S3 or S4, no murmur, click or rub, no peripheral edema and peripheral pulses strong  ABDOMEN: soft, nontender, no hepatosplenomegaly, no masses and bowel sounds normal  MS: no gross musculoskeletal defects noted, no edema    Diagnostic Test Results:  Labs reviewed in Epic  EKG - appears normal, NSR, normal axis, normal intervals, no acute ST/T changes c/w ischemia, no LVH by voltage criteria, unchanged from previous tracings        Assessment & Plan     1. Encounter for long-term (current) use of medications for q-t interval monitoring     - EKG 12-lead complete w/read - Clinics unchanged will send to [facility]   2. Acute sinusitis with symptoms > 10 days    - ipratropium (ATROVENT) 0.06 % nasal spray; Spray 2 sprays into both nostrils 4 times daily  Dispense: 15 mL; Refill: 1  - amoxicillin-clavulanate (AUGMENTIN) 875-125 MG tablet; Take 1 tablet by mouth 2 times daily for 10 days  Dispense: 20 tablet; Refill: 0      3. Encounter for " long-term (current) use of other medications  Needs recheck   - TSH    4. Elevated BP without diagnosis of hypertensionrecheck 2 weeks likely will improve when he stops the afrin      Patient Instructions   Stop the afrin, start the atrovent.     Take the antibiotic, recheck blood pressure in 2 weeeks or so         No follow-ups on file.    Radha Vivar MD  [facility]

## 2020-03-09 NOTE — PATIENT INSTRUCTIONS
Stop the afrin, start the atrovent.     Take the antibiotic, recheck blood pressure in 2 weeeks or so

## 2020-04-08 DIAGNOSIS — J01.90 ACUTE SINUSITIS WITH SYMPTOMS > 10 DAYS: ICD-10-CM

## 2020-04-08 RX ORDER — IPRATROPIUM BROMIDE 42 UG/1
2 SPRAY, METERED NASAL 4 TIMES DAILY
Qty: 15 ML | Refills: 1 | Status: ON HOLD | OUTPATIENT
Start: 2020-04-08 | End: 2020-07-19

## 2020-04-08 NOTE — TELEPHONE ENCOUNTER
"ipratropium (ATROVENT) 0.06 % nasal spray      Last Written Prescription Date:  3/5/20  Last Fill Quantity: 15,   # refills: 1  Last Office Visit: 3/5/20  Future Office visit:       Requested Prescriptions   Pending Prescriptions Disp Refills     ipratropium (ATROVENT) 0.06 % nasal spray 15 mL 1     Sig: Spray 2 sprays into both nostrils 4 times daily       Nasal Allergy Protocol Passed - 4/8/2020 10:10 AM        Passed - Patient is age 12 or older        Passed - Recent (12 mo) or future (30 days) visit within the authorizing provider's specialty     Patient has had an office visit with the authorizing provider or a provider within the authorizing providers department within the previous 12 mos or has a future within next 30 days. See \"Patient Info\" tab in inbasket, or \"Choose Columns\" in Meds & Orders section of the refill encounter.              Passed - Medication is active on med list             "

## 2020-04-24 ENCOUNTER — TELEPHONE (OUTPATIENT)
Dept: FAMILY MEDICINE | Facility: CLINIC | Age: 70
End: 2020-04-24

## 2020-04-24 NOTE — TELEPHONE ENCOUNTER
Central Prior Authorization Team   Phone: [phone]      PA Initiation    Medication: Chantix Starting Month Mansoor  Insurance Company: [employer] [facility] - Phone [phone] Fax [phone]  Pharmacy Filling the Rx: [facility] [place] - [id number] [facility]  Filling Pharmacy Phone: [phone]  Filling Pharmacy Fax:    Start Date: 4/24/2020

## 2020-04-24 NOTE — TELEPHONE ENCOUNTER
Prior Authorization Approval    Authorization Effective Date: 1/25/2020  Authorization Expiration Date: 10/21/2020  Medication: Chantix Starting Month Mansoor  Approved Dose/Quantity:    Reference #:     Insurance Company: [employer] - Phone [phone] Fax [phone]  Expected CoPay:       CoPay Card Available:      Foundation Assistance Needed:    Which Pharmacy is filling the prescription (Not needed for infusion/clinic administered): [facility] - [facility] [id number] [facility]  Pharmacy Notified: Yes  Patient Notified: Yes  **Instructed pharmacy to notify patient when script is ready to /ship.**

## 2020-04-24 NOTE — TELEPHONE ENCOUNTER
Received a CoverMyMeds prior authorization request from [facility] for Chantix Starter javier    Routed to the [employer] electronic prior authorization team          Seng Norman RT (r)  [facility]

## 2020-04-27 DIAGNOSIS — J30.2 CHRONIC SEASONAL ALLERGIC RHINITIS: ICD-10-CM

## 2020-04-27 RX ORDER — FLUTICASONE PROPIONATE 50 MCG
1-2 SPRAY, SUSPENSION (ML) NASAL DAILY
Qty: 48 G | Refills: 1 | Status: SHIPPED | OUTPATIENT
Start: 2020-04-27

## 2020-05-11 DIAGNOSIS — G89.29 CHRONIC BILATERAL LOW BACK PAIN WITHOUT SCIATICA: ICD-10-CM

## 2020-05-11 DIAGNOSIS — M54.50 CHRONIC BILATERAL LOW BACK PAIN WITHOUT SCIATICA: ICD-10-CM

## 2020-05-11 RX ORDER — METHOCARBAMOL 750 MG/1
TABLET, FILM COATED ORAL
Qty: 90 TABLET | Refills: 1 | Status: ON HOLD | OUTPATIENT
Start: 2020-05-11 | End: 2020-07-19

## 2020-05-13 DIAGNOSIS — F17.210 CIGARETTE SMOKER: ICD-10-CM

## 2020-05-13 NOTE — TELEPHONE ENCOUNTER
"Routing refill request to provider for review/approval because:  Blood pressure not in range.    Requested Prescriptions   Pending Prescriptions Disp Refills     varenicline (CHANTIX STARTING MONTH MICHEAL) 0.5 MG X 11 & 1 MG X 42 tablet [Pharmacy Med Name: Chantix Starting Month Box 0.5 mg (11)-1 mg (42) tablets in dose pack] 53 tablet 1     Sig: Take 0.5 mg tab daily for 3 days, THEN 0.5 mg tab twice daily for 4 days, THEN 1 mg twice daily.       Partial Cholinergic Nicotinic Agonist Agents Failed - 5/13/2020  8:00 AM        Failed - Blood pressure under 140/90 in past 12 months     BP Readings from Last 3 Encounters:   03/05/20 (!) 174/83   09/05/19 122/76   08/05/19 132/73                 Passed - Recent (12 mo) or future (30 days) visit within the authorizing provider's specialty     Patient has had an office visit with the authorizing provider or a provider within the authorizing providers department within the previous 12 mos or has a future within next 30 days. See \"Patient Info\" tab in inbasket, or \"Choose Columns\" in Meds & Orders section of the refill encounter.              Passed - Medication is active on med list        Passed - Patient is 18 years of age or older             "

## 2020-07-16 ENCOUNTER — APPOINTMENT (OUTPATIENT)
Dept: GENERAL RADIOLOGY | Facility: CLINIC | Age: 70
End: 2020-07-16
Attending: PHYSICIAN ASSISTANT
Payer: MEDICARE

## 2020-07-16 ENCOUNTER — HOSPITAL ENCOUNTER (EMERGENCY)
Facility: CLINIC | Age: 70
Discharge: HOME | End: 2020-07-16
Attending: FAMILY MEDICINE
Payer: MEDICARE

## 2020-07-16 ENCOUNTER — HOSPITAL ENCOUNTER (EMERGENCY)
Facility: CLINIC | Age: 70
Discharge: HOME | End: 2020-07-16
Attending: PHYSICIAN ASSISTANT | Admitting: PHYSICIAN ASSISTANT
Payer: MEDICARE

## 2020-07-16 VITALS
RESPIRATION RATE: 19 BRPM | TEMPERATURE: 97 F | OXYGEN SATURATION: 99 % | WEIGHT: 160 LBS | BODY MASS INDEX: 22.96 KG/M2 | SYSTOLIC BLOOD PRESSURE: 191 MMHG | DIASTOLIC BLOOD PRESSURE: 91 MMHG | HEART RATE: 51 BPM

## 2020-07-16 VITALS
HEART RATE: 59 BPM | OXYGEN SATURATION: 94 % | DIASTOLIC BLOOD PRESSURE: 111 MMHG | RESPIRATION RATE: 18 BRPM | SYSTOLIC BLOOD PRESSURE: 188 MMHG | TEMPERATURE: 97.5 F

## 2020-07-16 DIAGNOSIS — R03.0 ELEVATED BP WITHOUT DIAGNOSIS OF HYPERTENSION: ICD-10-CM

## 2020-07-16 DIAGNOSIS — T50.905A ACUTE MEDICATION-INDUCED AKATHISIA: ICD-10-CM

## 2020-07-16 DIAGNOSIS — E87.6 HYPOKALEMIA: ICD-10-CM

## 2020-07-16 DIAGNOSIS — G25.71 ACUTE MEDICATION-INDUCED AKATHISIA: ICD-10-CM

## 2020-07-16 DIAGNOSIS — R19.7 DIARRHEA: ICD-10-CM

## 2020-07-16 LAB
ALBUMIN SERPL-MCNC: 4 G/DL (ref 3.4–5)
ALP SERPL-CCNC: 73 U/L (ref 40–150)
ALT SERPL W P-5'-P-CCNC: 17 U/L (ref 0–70)
ANION GAP SERPL CALCULATED.3IONS-SCNC: 7 MMOL/L (ref 3–14)
ANION GAP SERPL CALCULATED.3IONS-SCNC: 7 MMOL/L (ref 3–14)
AST SERPL W P-5'-P-CCNC: 21 U/L (ref 0–45)
BASOPHILS # BLD AUTO: 0.1 10E9/L (ref 0–0.2)
BASOPHILS NFR BLD AUTO: 0.8 %
BILIRUB SERPL-MCNC: 0.5 MG/DL (ref 0.2–1.3)
BUN SERPL-MCNC: 10 MG/DL (ref 7–30)
BUN SERPL-MCNC: 11 MG/DL (ref 7–30)
CALCIUM SERPL-MCNC: 9.3 MG/DL (ref 8.5–10.1)
CALCIUM SERPL-MCNC: 9.6 MG/DL (ref 8.5–10.1)
CHLORIDE SERPL-SCNC: 105 MMOL/L (ref 94–109)
CHLORIDE SERPL-SCNC: 110 MMOL/L (ref 94–109)
CO2 SERPL-SCNC: 25 MMOL/L (ref 20–32)
CO2 SERPL-SCNC: 26 MMOL/L (ref 20–32)
CREAT SERPL-MCNC: 1.12 MG/DL (ref 0.66–1.25)
CREAT SERPL-MCNC: 1.17 MG/DL (ref 0.66–1.25)
DIFFERENTIAL METHOD BLD: NORMAL
EOSINOPHIL # BLD AUTO: 0 10E9/L (ref 0–0.7)
EOSINOPHIL NFR BLD AUTO: 0.2 %
ERYTHROCYTE [DISTWIDTH] IN BLOOD BY AUTOMATED COUNT: 14 % (ref 10–15)
GFR SERPL CREATININE-BSD FRML MDRD: 64 ML/MIN/{1.73_M2}
GFR SERPL CREATININE-BSD FRML MDRD: 67 ML/MIN/{1.73_M2}
GLUCOSE SERPL-MCNC: 103 MG/DL (ref 70–99)
GLUCOSE SERPL-MCNC: 112 MG/DL (ref 70–99)
HCT VFR BLD AUTO: 42.7 % (ref 40–53)
HGB BLD-MCNC: 14.4 G/DL (ref 13.3–17.7)
IMM GRANULOCYTES # BLD: 0 10E9/L (ref 0–0.4)
IMM GRANULOCYTES NFR BLD: 0.2 %
LYMPHOCYTES # BLD AUTO: 2 10E9/L (ref 0.8–5.3)
LYMPHOCYTES NFR BLD AUTO: 22.4 %
MCH RBC QN AUTO: 31.6 PG (ref 26.5–33)
MCHC RBC AUTO-ENTMCNC: 33.7 G/DL (ref 31.5–36.5)
MCV RBC AUTO: 94 FL (ref 78–100)
MONOCYTES # BLD AUTO: 0.5 10E9/L (ref 0–1.3)
MONOCYTES NFR BLD AUTO: 5.5 %
NEUTROPHILS # BLD AUTO: 6.2 10E9/L (ref 1.6–8.3)
NEUTROPHILS NFR BLD AUTO: 70.9 %
NRBC # BLD AUTO: 0 10*3/UL
NRBC BLD AUTO-RTO: 0 /100
PLATELET # BLD AUTO: 328 10E9/L (ref 150–450)
POTASSIUM SERPL-SCNC: 2.9 MMOL/L (ref 3.4–5.3)
POTASSIUM SERPL-SCNC: 4 MMOL/L (ref 3.4–5.3)
PROT SERPL-MCNC: 7.6 G/DL (ref 6.8–8.8)
RBC # BLD AUTO: 4.56 10E12/L (ref 4.4–5.9)
SODIUM SERPL-SCNC: 137 MMOL/L (ref 133–144)
SODIUM SERPL-SCNC: 143 MMOL/L (ref 133–144)
TROPONIN I SERPL-MCNC: <0.015 UG/L (ref 0–0.04)
TSH SERPL DL<=0.005 MIU/L-ACNC: 0.62 MU/L (ref 0.4–4)
WBC # BLD AUTO: 8.8 10E9/L (ref 4–11)

## 2020-07-16 PROCEDURE — 99285 EMERGENCY DEPT VISIT HI MDM: CPT | Mod: 25,27 | Performed by: PHYSICIAN ASSISTANT

## 2020-07-16 PROCEDURE — 80048 BASIC METABOLIC PNL TOTAL CA: CPT | Mod: 59 | Performed by: PHYSICIAN ASSISTANT

## 2020-07-16 PROCEDURE — 96365 THER/PROPH/DIAG IV INF INIT: CPT | Performed by: PHYSICIAN ASSISTANT

## 2020-07-16 PROCEDURE — 25800030 ZZH RX IP 258 OP 636: Performed by: PHYSICIAN ASSISTANT

## 2020-07-16 PROCEDURE — 84484 ASSAY OF TROPONIN QUANT: CPT | Performed by: PHYSICIAN ASSISTANT

## 2020-07-16 PROCEDURE — 84443 ASSAY THYROID STIM HORMONE: CPT | Performed by: PHYSICIAN ASSISTANT

## 2020-07-16 PROCEDURE — 93010 ELECTROCARDIOGRAM REPORT: CPT | Mod: Z6 | Performed by: PHYSICIAN ASSISTANT

## 2020-07-16 PROCEDURE — 99284 EMERGENCY DEPT VISIT MOD MDM: CPT | Mod: 25,27 | Performed by: FAMILY MEDICINE

## 2020-07-16 PROCEDURE — 25000128 H RX IP 250 OP 636: Performed by: FAMILY MEDICINE

## 2020-07-16 PROCEDURE — 25000132 ZZH RX MED GY IP 250 OP 250 PS 637: Mod: GY | Performed by: FAMILY MEDICINE

## 2020-07-16 PROCEDURE — 96365 THER/PROPH/DIAG IV INF INIT: CPT | Performed by: FAMILY MEDICINE

## 2020-07-16 PROCEDURE — 93005 ELECTROCARDIOGRAM TRACING: CPT | Performed by: PHYSICIAN ASSISTANT

## 2020-07-16 PROCEDURE — 25000128 H RX IP 250 OP 636: Performed by: PHYSICIAN ASSISTANT

## 2020-07-16 PROCEDURE — 80053 COMPREHEN METABOLIC PANEL: CPT | Performed by: FAMILY MEDICINE

## 2020-07-16 PROCEDURE — 99285 EMERGENCY DEPT VISIT HI MDM: CPT | Mod: 25 | Performed by: PHYSICIAN ASSISTANT

## 2020-07-16 PROCEDURE — 71046 X-RAY EXAM CHEST 2 VIEWS: CPT

## 2020-07-16 PROCEDURE — 85025 COMPLETE CBC W/AUTO DIFF WBC: CPT | Performed by: PHYSICIAN ASSISTANT

## 2020-07-16 PROCEDURE — 25000132 ZZH RX MED GY IP 250 OP 250 PS 637: Mod: GY | Performed by: PHYSICIAN ASSISTANT

## 2020-07-16 PROCEDURE — 25800030 ZZH RX IP 258 OP 636: Performed by: FAMILY MEDICINE

## 2020-07-16 RX ORDER — SODIUM CHLORIDE 9 MG/ML
1000 INJECTION, SOLUTION INTRAVENOUS CONTINUOUS
Status: DISCONTINUED | OUTPATIENT
Start: 2020-07-16 | End: 2020-07-17 | Stop reason: HOSPADM

## 2020-07-16 RX ORDER — LORAZEPAM 1 MG/1
1 TABLET ORAL ONCE
Status: DISCONTINUED | OUTPATIENT
Start: 2020-07-16 | End: 2020-07-17 | Stop reason: HOSPADM

## 2020-07-16 RX ORDER — SODIUM CHLORIDE 9 MG/ML
INJECTION, SOLUTION INTRAVENOUS CONTINUOUS
Status: DISCONTINUED | OUTPATIENT
Start: 2020-07-16 | End: 2020-07-16 | Stop reason: HOSPADM

## 2020-07-16 RX ORDER — LORAZEPAM 1 MG/1
.5-1 TABLET ORAL
Qty: 1 TABLET | Refills: 0 | Status: SHIPPED | OUTPATIENT
Start: 2020-07-16

## 2020-07-16 RX ORDER — DIPHENHYDRAMINE HYDROCHLORIDE 50 MG/ML
25 INJECTION INTRAMUSCULAR; INTRAVENOUS ONCE
Status: DISCONTINUED | OUTPATIENT
Start: 2020-07-16 | End: 2020-07-16

## 2020-07-16 RX ORDER — POTASSIUM CHLORIDE 1500 MG/1
20 TABLET, EXTENDED RELEASE ORAL 2 TIMES DAILY
Qty: 10 TABLET | Refills: 0 | Status: ON HOLD | OUTPATIENT
Start: 2020-07-16 | End: 2020-07-22

## 2020-07-16 RX ORDER — CYPROHEPTADINE HYDROCHLORIDE 4 MG/1
4 TABLET ORAL ONCE
Status: COMPLETED | OUTPATIENT
Start: 2020-07-16 | End: 2020-07-16

## 2020-07-16 RX ORDER — LORAZEPAM 1 MG/1
1 TABLET ORAL
Status: DISCONTINUED | OUTPATIENT
Start: 2020-07-16 | End: 2020-07-17 | Stop reason: HOSPADM

## 2020-07-16 RX ORDER — LORAZEPAM 0.5 MG/1
0.5 TABLET ORAL ONCE
Status: COMPLETED | OUTPATIENT
Start: 2020-07-16 | End: 2020-07-16

## 2020-07-16 RX ORDER — POTASSIUM CL/LIDO/0.9 % NACL 10MEQ/0.1L
10 INTRAVENOUS SOLUTION, PIGGYBACK (ML) INTRAVENOUS ONCE
Status: COMPLETED | OUTPATIENT
Start: 2020-07-16 | End: 2020-07-16

## 2020-07-16 RX ORDER — POTASSIUM CHLORIDE 1500 MG/1
40 TABLET, EXTENDED RELEASE ORAL ONCE
Status: COMPLETED | OUTPATIENT
Start: 2020-07-16 | End: 2020-07-16

## 2020-07-16 RX ORDER — MAGNESIUM SULFATE 1 G/100ML
1 INJECTION INTRAVENOUS ONCE
Status: COMPLETED | OUTPATIENT
Start: 2020-07-16 | End: 2020-07-16

## 2020-07-16 RX ADMIN — POTASSIUM CHLORIDE 40 MEQ: 20 TABLET, EXTENDED RELEASE ORAL at 13:24

## 2020-07-16 RX ADMIN — MAGNESIUM SULFATE HEPTAHYDRATE 1 G: 1 INJECTION, SOLUTION INTRAVENOUS at 21:49

## 2020-07-16 RX ADMIN — LORAZEPAM 0.5 MG: 0.5 TABLET ORAL at 13:42

## 2020-07-16 RX ADMIN — SODIUM CHLORIDE 1000 ML: 9 INJECTION, SOLUTION INTRAVENOUS at 13:22

## 2020-07-16 RX ADMIN — SODIUM CHLORIDE 500 ML: 9 INJECTION, SOLUTION INTRAVENOUS at 21:49

## 2020-07-16 RX ADMIN — CYPROHEPTADINE HYDROCHLORIDE 4 MG: 4 TABLET ORAL at 21:48

## 2020-07-16 RX ADMIN — Medication 10 MEQ: at 13:22

## 2020-07-16 ASSESSMENT — ENCOUNTER SYMPTOMS
COUGH: 0
WHEEZING: 0
NUMBNESS: 0
MUSCULOSKELETAL NEGATIVE: 1
TREMORS: 1
SORE THROAT: 0
SHORTNESS OF BREATH: 0
PALPITATIONS: 0
PALPITATIONS: 1
BLOOD IN STOOL: 0
CONSTITUTIONAL NEGATIVE: 1
DIZZINESS: 0
FEVER: 0
GASTROINTESTINAL NEGATIVE: 1
DIARRHEA: 0
HEADACHES: 0
CONSTIPATION: 0
WEAKNESS: 0
DIAPHORESIS: 0
VOMITING: 0
FREQUENCY: 0
LIGHT-HEADEDNESS: 0
HEADACHES: 0
DYSURIA: 0
ABDOMINAL PAIN: 0
CHILLS: 0
NAUSEA: 0
SINUS PRESSURE: 0

## 2020-07-16 NOTE — ED NOTES
Pt states legs keep twitching, provider told him it was because of his low potassium. NS bolus started. IV potassium replacement infusing. PO potassium also replaced. Pt states he usually takes ambien but he is out and can't fill his ambien until next week; he did not sleep at all last night. Pt said he does not want to leave until this is fixed.

## 2020-07-16 NOTE — DISCHARGE INSTRUCTIONS
Increase fluids, rest, bland diet.     Take medication as directed.   Call to set up appointment with primary care provider for recheck in 5 days.     Return to Emergency Room if redness of breath, chest pain, heart racing or skipping beats, return of symptoms, or change in symptoms occur.

## 2020-07-16 NOTE — ED NOTES
"Patient here with complaints of \"shaking\". He states that he was unable to sleep due to tremors. Upon arrival to triage, the patient was found to have a heart rate of 35. Denies chest pain or shortness of breath. States he feels fine now. States he could feel his heart beating \"heavy\" last night, denies currently.   "

## 2020-07-16 NOTE — ED AVS SNAPSHOT
[facility]  [address] [address]  Phone:  [phone]  Fax:  [phone]                                    Juan Lindsay   MRN: [id number]    Department:  [facility]   Date of Visit:  7/16/2020           After Visit Summary Signature Page    I have received my discharge instructions, and my questions have been answered. I have discussed any challenges I see with this plan with the nurse or doctor.    ..........................................................................................................................................  Patient/Patient Representative Signature      ..........................................................................................................................................  Patient Representative Print Name and Relationship to Patient    ..................................................               ................................................  Date                                   Time    ..........................................................................................................................................  Reviewed by Signature/Title    ...................................................              ..............................................  Date                                               Time          22EPIC Rev 08/18

## 2020-07-16 NOTE — ED PROVIDER NOTES
"  History     Chief Complaint   Patient presents with     Palpitations     feels shaky     HPI  Juan Lindsay is a 70 year old male with history of infection of prosthetic knee joint, knee replacement, long QT interval, myofascial pain, anemia, obstructive sleep apnea, osteopenia, moderate major depression, anxiety state, sleep apnea, stopped alcohol 30 years ago, and degenerative disc disease who presents today with twitching/shaking sensation that started last night along with heart \"pounding harder.\" patient states these symptoms started last night and have been constant since. Patient states he was not able to sleep last night due to symptoms. Patient denies headache, dizziness, confusion, feeling near syncopal, visual changes, heart racing or skipping beats, chest pain, shortness of breath, abdominal pain, nausea or vomiting, or rash.  Patient denies any bloody or black tarry stools, numbness/tingling, or weakness in the upper or lower extremities.  Patient states he did have yesterday, but that resolved and he has not had any episodes of loose stools today.  Patient denies any new medication changes, changes in foods, known exposure almost COVID-19, or similar symptoms in the past. Patient has not taken anything for symptoms.  Patient denies any history of DVTs or PEs and denies any calf pain or swelling.  Patient has not had any recent long distance travels, recent surgeries, and denies any drug or alcohol use currently.    Allergies:  Allergies   Allergen Reactions     Nka [No Known Allergies]        Problem List:    Patient Active Problem List    Diagnosis Date Noted     Infection of prosthetic knee joint (H) 04/29/2017     Priority: Medium     Aftercare following joint replacement surgery 03/17/2017     Priority: Medium     Prepatellar bursitis 03/08/2017     Priority: Medium     Long QT interval 08/03/2016     Priority: Medium     Myofascial pain 05/08/2015     Priority: Medium     Advanced directives, " counseling/discussion 01/12/2015     Priority: Medium     Advance Care Planning:   ACP Review and Resources Provided:  Reviewed chart for advance care plan.  Juan Lindsay has no plan or code status on file however states presence of ACP document. Copy requested. Added by Keisha Hernandez on 1/12/2015             Anemia 11/03/2014     Priority: Medium     Iron deficiency anemia 04/30/2014     Priority: Medium     Encounter for other administrative examinations 03/04/2014     Priority: Medium     Overview:   [facility] with Dr. Rupesh Roach       Obstructive sleep apnea 07/01/2013     Priority: Medium     Arthralgia of shoulder 07/23/2012     Priority: Medium     Osteopenia 10/19/2011     Priority: Medium     10/19/2011 - recommended 5oo mg calcium with vit D tid. recheck in two years.       Moderate major depression (H) 10/12/2011     Priority: Medium     Sedative, hypnotic or anxiolytic abuse, continuous (H) 03/04/2010     Priority: Medium     He came to see me one week ago complaining of significant increase in his anxiety. He told me that he had been off the Valium 5 mg twice a day since last May.  This was not true.  He had just seen Dr. Mallory at [facility] on February 8, 2010 and had been prescribed Valium 5 mg twice a day. During the visit I told him I would give him Ativan 1 mg up to three times a day for one months for love #90 pills.  I gave him a referral to psychiatry.  I will not be prescribing narcotics or tranquilizers in the future.       Anxiety state 09/18/2006     Priority: Medium     Problem list name updated by automated process. Provider to review       PAIN BACK, LOW 06/19/2006     Priority: Medium     Chronic - seen at [facility] 1/13/11 - continue same MS contin, norco, ambien dosing  F/u in March       Sleep apnea      Priority: Medium     Problem list name updated by automated process. Provider to review       ALCOH DEP not active for 28 years      Priority: Medium      Tobacco use disorder      Priority: Medium     Allergic rhinitis due to other allergen      Priority: Medium     Degeneration of intervertebral disc of lumbosacral region 05/25/2006     Priority: Medium     Health Care Home 06/27/2013     Priority: Low     EMERGENCY CARE PLAN  June 27, 2013: No current Care Coordination follow up planned. Please refer if Care Coordination services are needed.    Presenting Problem Signs and Symptoms Treatment Plan   Questions or concerns   during clinic hours   I will call my clinic directly:  [address] [address] [address] [place] [address]  [phone].    Questions or concerns outside clinic hours   I will call the 24 hour nurse line at   [phone] or [employer].   Need to schedule an appointment   I will call the 24 hour scheduling team at [phone] or my clinic directly at [phone].    Same day treatment     I will call my clinic first, nurse line if after hours, urgent care and express care if needed.   Clinic care coordination services (regular clinic hours)     I will call a clinic care coordinator directly:     Festus Osman RN  Mon, Tues, Fri - [phone]  Wed, Thurs - [phone]    Ania Moyer :    [phone]    Or call my clinic at [phone] and ask to speak with care coordination.   Crisis Services: Behavioral or Mental Health  [facility] 24 Hour Phone Line  [phone]    [facility] 24 Hour Crisis Services  [phone]    [facility] (Behavioral Health Providers) Network [phone]    [facility]   [phone]       Emergency treatment -- Immediately    [facility]All 911             Past Medical History:    Past Medical History:   Diagnosis Date     Allergic rhinitis due to other allergen      Arthritis      Depressive disorder, not elsewhere classified      Other and unspecified alcohol dependence, unspecified drinking behavior      Tobacco use disorder      Unspecified sleep apnea        Past Surgical History:     Past Surgical History:   Procedure Laterality Date     ABDOMEN SURGERY  1998     APPENDECTOMY  1998     COLONOSCOPY  10/8/1998    Colonoscopy     COLONOSCOPY N/A 2019    Procedure: COLONOSCOPY;  Surgeon: René Luna MD;  Location: [facility]     ORTHOPEDIC SURGERY  2018     SOFT TISSUE SURGERY      left elbow     SURGICAL HISTORY OF -   1999    Uvulopalatopharyngoplasty with Tonsillecotomy     SURGICAL HISTORY OF -   2003    Septoplasty     SURGICAL HISTORY OF -       L Elbow     SURGICAL HISTORY OF -   2004     L Knee Arthroscopy     SURGICAL HISTORY OF -   10/2004    L Rotator Cuff Repair     SURGICAL HISTORY OF -       Ruptured appendix       Family History:    Family History   Problem Relation Age of Onset     Lipids Mother      Alzheimer Disease Mother               Anemia Mother         Questionable     Hyperlipidemia Mother      Cerebrovascular Disease Mother      Anxiety Disorder Mother      Unknown/Adopted Mother      Lipids Father      Cerebrovascular Disease Father          - mouth cancer     Cancer Father      Neurologic Disorder Brother         atacksia     Substance Abuse Brother         Ataxia,      Cerebrovascular Disease Brother         Ataxia, alchol abuse     Alcohol/Drug Brother         Alcohal and Rx abuse. Suicide      Psychotic Disorder Brother      Thyroid Disease Brother      Cerebrovascular Disease Brother         suicide     Anxiety Disorder Brother      Substance Abuse Brother         suicide     Thyroid Disease Brother        Social History:  Marital Status:   [2]  Social History     Tobacco Use     Smoking status: Light Tobacco Smoker     Packs/day: 0.50     Years: 25.00     Pack years: 12.50     Types: Cigarettes     Start date: 1980     Last attempt to quit: 2014     Years since quittin.8     Smokeless tobacco: Never Used     Tobacco comment: can't get my mind to agree with my feelings   Substance Use Topics     Alcohol  "use: No     Drug use: No        Medications:    potassium chloride ER (KLOR-CON M) 20 MEQ CR tablet  aspirin  MG EC tablet  Cholecalciferol (VITAMIN D PO)  Cyanocobalamin (B-12 PO)  Cyanocobalamin (VITAMIN B12 PO)  escitalopram (LEXAPRO) 20 MG tablet  ferrous gluconate (FERGON) 324 (38 Fe) MG tablet  fluticasone (FLONASE) 50 MCG/ACT nasal spray  gabapentin (NEURONTIN) 600 MG tablet  hydrocodone-acetaminophen (NORCO)  MG per tablet  hydrOXYzine (ATARAX) 25 MG tablet  ibuprofen (ADVIL/MOTRIN) 600 MG tablet  ipratropium (ATROVENT) 0.06 % nasal spray  methocarbamol (ROBAXIN) 750 MG tablet  morphine (MS CONTIN) 15 MG 12 hr tablet  Nutritional Supplements (SENIOR MENS FORMULA OR)  varenicline (CHANTIX STARTING MONTH PAK) 0.5 MG X 11 & 1 MG X 42 tablet  zolpidem (AMBIEN) 10 MG tablet          Review of Systems   Constitutional: Negative.    HENT: Negative.    Cardiovascular: Negative for chest pain, palpitations and leg swelling.        \"pounding harder\"    Gastrointestinal: Negative.    Genitourinary: Negative.    Musculoskeletal: Negative.    Skin: Negative.    Neurological: Negative for dizziness, syncope, weakness, light-headedness, numbness and headaches.        Patient states he feels \"shaky/twitchy\"    All other systems reviewed and are negative.      Physical Exam   BP: (!) 189/72  Pulse: (!) 38  Heart Rate: 56  Temp: 97  F (36.1  C)  Resp: 16  Weight: 72.6 kg (160 lb)  SpO2: 99 %      Physical Exam  Vitals signs and nursing note reviewed.   Constitutional:       General: He is not in acute distress.     Appearance: Normal appearance. He is normal weight. He is not ill-appearing or toxic-appearing.   HENT:      Head: Normocephalic and atraumatic.      Mouth/Throat:      Mouth: Mucous membranes are moist.      Pharynx: No oropharyngeal exudate or posterior oropharyngeal erythema.   Eyes:      General:         Right eye: No discharge.         Left eye: No discharge.      Extraocular Movements: Extraocular " "movements intact.      Right eye: No nystagmus.      Left eye: No nystagmus.      Conjunctiva/sclera: Conjunctivae normal.      Pupils: Pupils are equal, round, and reactive to light.   Neck:      Musculoskeletal: Normal range of motion and neck supple. No neck rigidity or muscular tenderness.      Vascular: No carotid bruit.   Cardiovascular:      Rate and Rhythm: Normal rate and regular rhythm.      Pulses: Normal pulses.      Heart sounds: Normal heart sounds.      Comments: Bilateral upper and lower extremities with equal pulses.   Pulmonary:      Effort: Pulmonary effort is normal. No respiratory distress.      Breath sounds: Normal breath sounds. No stridor. No wheezing, rhonchi or rales.   Chest:      Chest wall: No tenderness.   Abdominal:      General: Abdomen is flat. Bowel sounds are normal.      Palpations: Abdomen is soft.      Tenderness: There is no abdominal tenderness. There is no right CVA tenderness or left CVA tenderness.   Musculoskeletal:         General: No swelling, tenderness or deformity.      Right lower leg: No edema.      Left lower leg: No edema.   Lymphadenopathy:      Cervical: No cervical adenopathy.   Skin:     General: Skin is warm.      Capillary Refill: Capillary refill takes less than 2 seconds.      Findings: No erythema or rash.   Neurological:      General: No focal deficit present.      Mental Status: He is alert and oriented to person, place, and time.   Psychiatric:         Mood and Affect: Mood normal.         Behavior: Behavior normal.         Thought Content: Thought content normal.         Judgment: Judgment normal.         ED Course        Procedures             Critical Care time:  none       EKG Interpretation:      EKG Number: 1 @ 11:31am   Interpreted by Chetna Rabago, PAFelicityC: Dr. Jovany Molina  Symptoms at time of EKG: bradycardia and \"twitching sensation\"    Rhythm: Sinus bradycardia  Rate: Bradycardia; 57  Axis: Normal  Ectopy: None  Conduction: Normal  ST " Segments/ T Waves: No ST-T wave changes, No acute ischemic changes and U waves present II, aVF, V4, V5 and V6  Q Waves: None  Comparison to prior: Unchanged from 3/5/2020  Clinical Impression: sinus bradycardia with prolonged QT waves; no acute changes from previous EKG.                 Results for orders placed or performed during the hospital encounter of 07/16/20 (from the past 24 hour(s))   CBC with platelets differential   Result Value Ref Range    WBC 8.8 4.0 - 11.0 10e9/L    RBC Count 4.56 4.4 - 5.9 10e12/L    Hemoglobin 14.4 13.3 - 17.7 g/dL    Hematocrit 42.7 40.0 - 53.0 %    MCV 94 78 - 100 fl    MCH 31.6 26.5 - 33.0 pg    MCHC 33.7 31.5 - 36.5 g/dL    RDW 14.0 10.0 - 15.0 %    Platelet Count 328 150 - 450 10e9/L    Diff Method Automated Method     % Neutrophils 70.9 %    % Lymphocytes 22.4 %    % Monocytes 5.5 %    % Eosinophils 0.2 %    % Basophils 0.8 %    % Immature Granulocytes 0.2 %    Nucleated RBCs 0 0 /100    Absolute Neutrophil 6.2 1.6 - 8.3 10e9/L    Absolute Lymphocytes 2.0 0.8 - 5.3 10e9/L    Absolute Monocytes 0.5 0.0 - 1.3 10e9/L    Absolute Eosinophils 0.0 0.0 - 0.7 10e9/L    Absolute Basophils 0.1 0.0 - 0.2 10e9/L    Abs Immature Granulocytes 0.0 0 - 0.4 10e9/L    Absolute Nucleated RBC 0.0    Basic metabolic panel   Result Value Ref Range    Sodium 137 133 - 144 mmol/L    Potassium 2.9 (L) 3.4 - 5.3 mmol/L    Chloride 105 94 - 109 mmol/L    Carbon Dioxide 25 20 - 32 mmol/L    Anion Gap 7 3 - 14 mmol/L    Glucose 112 (H) 70 - 99 mg/dL    Urea Nitrogen 11 7 - 30 mg/dL    Creatinine 1.17 0.66 - 1.25 mg/dL    GFR Estimate 64 >60 mL/min/[1.73_m2]    GFR Estimate If Black 74 >60 mL/min/[1.73_m2]    Calcium 9.3 8.5 - 10.1 mg/dL   Troponin I   Result Value Ref Range    Troponin I ES <0.015 0.000 - 0.045 ug/L   TSH with free T4 reflex   Result Value Ref Range    TSH 0.62 0.40 - 4.00 mU/L   XR Chest 2 Views    Narrative    XR CHEST 2 VW 7/16/2020 12:17 PM    HISTORY: Palpitations.    COMPARISON:  "10/19/2011.    FINDINGS: Normal cardiac mediastinal silhouette. Hyperexpanded lungs  concerning for underlying obstructive lung disease. No acute  infiltrate or pulmonary edema. No pneumothorax, pneumoperitoneum or  pleural effusion.    GREGORY JOHNSON MD       Medications   0.9% sodium chloride BOLUS (0 mLs Intravenous Stopped 7/16/20 1427)     Followed by   sodium chloride 0.9% infusion (has no administration in time range)   potassium chloride 10 mEq in 100 mL intermittent infusion with 10 mg lidocaine (0 mEq Intravenous Stopped 7/16/20 1427)   potassium chloride ER (KLOR-CON M) CR tablet 40 mEq (40 mEq Oral Given 7/16/20 1324)   LORazepam (ATIVAN) tablet 0.5 mg (0.5 mg Oral Given 7/16/20 1342)       Assessments & Plan (with Medical Decision Making)     I have reviewed the nursing notes.    I have reviewed the findings, diagnosis, plan and need for follow up with the patient.    Juan Lindsay is a 70 year old male with history of infection of prosthetic knee joint, knee replacement, long QT interval, myofascial pain, anemia, obstructive sleep apnea, osteopenia, moderate major depression, anxiety state, sleep apnea, stopped alcohol 30 years ago, and degenerative disc disease who presents today with twitching/shaking sensation that started last night along with heart \"pounding harder.\" patient states these symptoms started last night and have been constant since. Patient states he was not able to sleep last night due to symptoms. Patient denies headache, dizziness, confusion, feeling near syncopal, visual changes, heart racing or skipping beats, chest pain, shortness of breath, abdominal pain, nausea or vomiting, or rash.  Patient denies any bloody or black tarry stools, numbness/tingling, or weakness in the upper or lower extremities.  Patient states he did have yesterday, but that resolved and he has not had any episodes of loose stools today.  Patient denies any new medication changes, changes in foods, " known exposure almost COVID-19, or similar symptoms in the past. Patient has not taken anything for symptoms.  Patient denies any history of DVTs or PEs and denies any calf pain or swelling.  Patient has not had any recent long distance travels, recent surgeries, and denies any drug or alcohol use currently.    See exam findings above.  EKG shows sinus bradycardia with some U waves and this is similar to his previous EKG with no new changes.  CBC, troponin and TSH all within normal limits.  Sick metabolic panel within normal limits other than glucose at 112 and potassium at 2.9.  Chest x-ray negative.  Due to patient's symptoms of presentation along with his potassium being low at 2.9 we gave patient an IV 10 mEq potassium dose here and 40 mEq of K-Dur orally.  Patient was also given a single dose of 0.5mg lorazepam seemed to significantly improve the twitching and shaking.  Patient states he was feeling much better and had no symptoms at the time of discharge.  Patient discharged in stable condition in no acute respiratory distress, nontoxic and without symptoms.  Patient informed to follow-up with primary care doctor for recheck of potassium levels in 5 days.  Patient sent home with prescription potassium to take for the next 5 days.  Patient to increase fluids and rest and to follow-up or return to the emergency department if symptoms worsen or change these were discussed with patient given a discharge paperwork.  Patient discharged in stable condition.  I suspect that his symptoms were related to his low potassium although I would not expect his potassium to drop that significantly with only having diarrhea yesterday, but there is no other findings at this time or reasons other than possible diarrhea that would cause him to have this low potassium.  Patient does not have low potassium in the past.  Not sure if the twitching was related to that the hypokalemia and possibly some anxiety with lack of sleep last  night, but at time of discharge patient was completely resolved of his symptoms.    Discharge Medication List as of 7/16/2020  3:37 PM      START taking these medications    Details   potassium chloride ER (KLOR-CON M) 20 MEQ CR tablet Take 1 tablet (20 mEq) by mouth 2 times daily for 5 days, Disp-10 tablet,R-0, E-Prescribe             Final diagnoses:   Hypokalemia   Elevated BP without diagnosis of hypertension   Diarrhea - resolved       7/16/2020   [facility] EMERGENCY DEPARTMENT     Chetna Rabago PA-C  07/16/20 [id number]

## 2020-07-16 NOTE — ED NOTES
Ativan given to help pt relax; he cannot stop twitching and pacing. Pt needs to sit down to have vitals and heart monitored, pt understands this and will try.

## 2020-07-16 NOTE — ED AVS SNAPSHOT
[facility] Emergency Department  [address] [address]  Phone:  [phone]  Fax:  [phone]                                    Juan Lindsay   MRN: [id number]    Department:  [facility] Emergency Department   Date of Visit:  7/16/2020           After Visit Summary Signature Page    I have received my discharge instructions, and my questions have been answered. I have discussed any challenges I see with this plan with the nurse or doctor.    ..........................................................................................................................................  Patient/Patient Representative Signature      ..........................................................................................................................................  Patient Representative Print Name and Relationship to Patient    ..................................................               ................................................  Date                                   Time    ..........................................................................................................................................  Reviewed by Signature/Title    ...................................................              ..............................................  Date                                               Time          22EPIC Rev 08/18

## 2020-07-17 ENCOUNTER — TELEPHONE (OUTPATIENT)
Dept: FAMILY MEDICINE | Facility: CLINIC | Age: 70
End: 2020-07-17

## 2020-07-17 RX ORDER — LORAZEPAM 1 MG/1
.5-1 TABLET ORAL
Qty: 1 TABLET | Refills: 0 | Status: CANCELLED | OUTPATIENT
Start: 2020-07-17

## 2020-07-17 NOTE — ED NOTES
Sent home with take home ativan. Discharge info reviewed with pt states understanding. Questions answered. Pt ambulatory out of ED.

## 2020-07-17 NOTE — ED NOTES
Pt to be discharged with take home script from pharmacy for 1 tab lorazepam to take when he arrives at home, NOT to be given in department and then drive.

## 2020-07-17 NOTE — ED NOTES
"Pt continues to have inability to stay still. continues to move around room. Pt states \"I just need a break from this\". No change in sx with medications given.   "

## 2020-07-17 NOTE — ED PROVIDER NOTES
History     HPI  Juan Lindsay is a 70 year old male who presents with a history of tobacco abuse, prior alcohol abuse but has not been remission for the last 30 years, obstructive sleep apnea, iron deficiency anemia who presents from home after having been seen here earlier today for myoclonic jerking that is interfered with sleep over the last couple of days and was diagnosed with hypokalemia.  Symptoms improved after potassium replacement but returned for symptoms tonight.    These seem to occur after he had had diarrhea after taking senna and he had multiple episodes of stool out and then the symptoms followed that a couple of days ago.  He has not had similar episodes in the past.  There have been no new agents that might have led to this.  He has been out of his Ambien for the last week but takes no other benzodiazepines and no obvious withdrawal.  He notes no use of over-the-counter agents.  No history of serotonin syndrome.  He is on longstanding Lexapro.      Allergies:  Allergies   Allergen Reactions     Nka [No Known Allergies]        Problem List:    Patient Active Problem List    Diagnosis Date Noted     Infection of prosthetic knee joint (H) 04/29/2017     Priority: Medium     Aftercare following joint replacement surgery 03/17/2017     Priority: Medium     Prepatellar bursitis 03/08/2017     Priority: Medium     Long QT interval 08/03/2016     Priority: Medium     Myofascial pain 05/08/2015     Priority: Medium     Advanced directives, counseling/discussion 01/12/2015     Priority: Medium     Advance Care Planning:   ACP Review and Resources Provided:  Reviewed chart for advance care plan.  Juan Lindsay has no plan or code status on file however states presence of ACP document. Copy requested. Added by Keisha Hernandez on 1/12/2015             Anemia 11/03/2014     Priority: Medium     Iron deficiency anemia 04/30/2014     Priority: Medium     Encounter for other administrative examinations  03/04/2014     Priority: Medium     Overview:   [facility] with Dr. Rupesh Roach       Obstructive sleep apnea 07/01/2013     Priority: Medium     Arthralgia of shoulder 07/23/2012     Priority: Medium     Osteopenia 10/19/2011     Priority: Medium     10/19/2011 - recommended 5oo mg calcium with vit D tid. recheck in two years.       Moderate major depression (H) 10/12/2011     Priority: Medium     Sedative, hypnotic or anxiolytic abuse, continuous (H) 03/04/2010     Priority: Medium     He came to see me one week ago complaining of significant increase in his anxiety. He told me that he had been off the Valium 5 mg twice a day since last May.  This was not true.  He had just seen Dr. Mallory at [facility] on February 8, 2010 and had been prescribed Valium 5 mg twice a day. During the visit I told him I would give him Ativan 1 mg up to three times a day for one months for love #90 pills.  I gave him a referral to psychiatry.  I will not be prescribing narcotics or tranquilizers in the future.       Anxiety state 09/18/2006     Priority: Medium     Problem list name updated by automated process. Provider to review       PAIN BACK, LOW 06/19/2006     Priority: Medium     Chronic - seen at [facility] 1/13/11 - continue same MS contin, norco, ambien dosing  F/u in March       Sleep apnea      Priority: Medium     Problem list name updated by automated process. Provider to review       ALCOH DEP not active for 28 years      Priority: Medium     Tobacco use disorder      Priority: Medium     Allergic rhinitis due to other allergen      Priority: Medium     Degeneration of intervertebral disc of lumbosacral region 05/25/2006     Priority: Medium     Health Care Home 06/27/2013     Priority: Low     EMERGENCY CARE PLAN  June 27, 2013: No current Care Coordination follow up planned. Please refer if Care Coordination services are needed.    Presenting Problem Signs and Symptoms Treatment Plan   Questions or  concerns   during clinic hours   I will call my clinic directly:  [facility]  [address] [place] [address]  [phone].    Questions or concerns outside clinic hours   I will call the 24 hour nurse line at   [phone] or [phone].   Need to schedule an appointment   I will call the 24 hour scheduling team at [phone] or my clinic directly at [phone].    Same day treatment     I will call my clinic first, nurse line if after hours, urgent care and express care if needed.   Clinic care coordination services (regular clinic hours)     I will call a clinic care coordinator directly:     Festus Osman RN  Mon, Tues, Fri - [phone]  Wed, Thurs - [phone]    Ania Moyer :    [phone]    Or call my clinic at [phone] and ask to speak with care coordination.   Crisis Services: Behavioral or Mental Health  [facility] 24 Hour Phone Line  [phone]    [facility] 24 Hour Crisis Services  [phone]    [facility] (Behavioral Health Providers) Network [phone]    [facility]   [phone]       Emergency treatment -- Immediately    [facility]All 492             Past Medical History:    Past Medical History:   Diagnosis Date     Allergic rhinitis due to other allergen      Arthritis      Depressive disorder, not elsewhere classified      Other and unspecified alcohol dependence, unspecified drinking behavior      Tobacco use disorder      Unspecified sleep apnea        Past Surgical History:    Past Surgical History:   Procedure Laterality Date     ABDOMEN SURGERY  1998     APPENDECTOMY  1998     COLONOSCOPY  10/8/1998    Colonoscopy     COLONOSCOPY N/A 9/5/2019    Procedure: COLONOSCOPY;  Surgeon: René Luna MD;  Location: [facility]     ORTHOPEDIC SURGERY  2018     SOFT TISSUE SURGERY  2005    left elbow     SURGICAL HISTORY OF -   7/12/1999    Uvulopalatopharyngoplasty with Tonsillecotomy     SURGICAL HISTORY OF -   9/2003    Septoplasty      SURGICAL HISTORY OF -       L Elbow     SURGICAL HISTORY OF -   2004     L Knee Arthroscopy     SURGICAL HISTORY OF -   10/2004    L Rotator Cuff Repair     SURGICAL HISTORY OF -       Ruptured appendix       Family History:    Family History   Problem Relation Age of Onset     Lipids Mother      Alzheimer Disease Mother          2010     Anemia Mother         Questionable     Hyperlipidemia Mother      Cerebrovascular Disease Mother      Anxiety Disorder Mother      Unknown/Adopted Mother      Lipids Father      Cerebrovascular Disease Father          - mouth cancer     Cancer Father      Neurologic Disorder Brother         atacksia     Substance Abuse Brother         Ataxia,      Cerebrovascular Disease Brother         Ataxia, alchol abuse     Alcohol/Drug Brother         Alcohal and Rx abuse. Suicide 2007     Psychotic Disorder Brother      Thyroid Disease Brother      Cerebrovascular Disease Brother         suicide     Anxiety Disorder Brother      Substance Abuse Brother         suicide     Thyroid Disease Brother        Social History:  Marital Status:   [2]  Social History     Tobacco Use     Smoking status: Light Tobacco Smoker     Packs/day: 0.50     Years: 25.00     Pack years: 12.50     Types: Cigarettes     Start date: 1980     Last attempt to quit: 2014     Years since quittin.8     Smokeless tobacco: Never Used     Tobacco comment: can't get my mind to agree with my feelings   Substance Use Topics     Alcohol use: No     Drug use: No        Medications:    aspirin  MG EC tablet  Cholecalciferol (VITAMIN D PO)  Cyanocobalamin (B-12 PO)  Cyanocobalamin (VITAMIN B12 PO)  escitalopram (LEXAPRO) 20 MG tablet  ferrous gluconate (FERGON) 324 (38 Fe) MG tablet  fluticasone (FLONASE) 50 MCG/ACT nasal spray  gabapentin (NEURONTIN) 600 MG tablet  hydrocodone-acetaminophen (NORCO)  MG per tablet  hydrOXYzine (ATARAX) 25 MG tablet  ibuprofen (ADVIL/MOTRIN) 600  MG tablet  ipratropium (ATROVENT) 0.06 % nasal spray  methocarbamol (ROBAXIN) 750 MG tablet  morphine (MS CONTIN) 15 MG 12 hr tablet  Nutritional Supplements (SENIOR MENS FORMULA OR)  potassium chloride ER (KLOR-CON M) 20 MEQ CR tablet  varenicline (CHANTIX STARTING MONTH MICHEAL) 0.5 MG X 11 & 1 MG X 42 tablet  zolpidem (AMBIEN) 10 MG tablet          Review of Systems   Constitutional: Negative for chills, diaphoresis and fever.   HENT: Negative for ear pain, sinus pressure and sore throat.    Eyes: Negative for visual disturbance.   Respiratory: Negative for cough, shortness of breath and wheezing.    Cardiovascular: Positive for palpitations. Negative for chest pain.   Gastrointestinal: Negative for abdominal pain, blood in stool, constipation, diarrhea, nausea and vomiting.   Genitourinary: Negative for dysuria, frequency and urgency.   Skin: Negative for rash.   Neurological: Positive for tremors. Negative for headaches.   All other systems reviewed and are negative.      Physical Exam   BP: (!) 175/84  Pulse: 61  Temp: 97.5  F (36.4  C)  Resp: 18  SpO2: 98 %      Physical Exam  Constitutional:       General: He is in acute distress.      Appearance: He is not ill-appearing.   HENT:      Mouth/Throat:      Pharynx: Oropharynx is clear.   Eyes:      Conjunctiva/sclera: Conjunctivae normal.   Neck:      Musculoskeletal: Neck supple.   Cardiovascular:      Rate and Rhythm: Normal rate and regular rhythm.   Pulmonary:      Effort: Pulmonary effort is normal. No respiratory distress.      Breath sounds: Normal breath sounds. No stridor. No wheezing or rhonchi.   Abdominal:      General: Abdomen is flat. There is no distension.      Tenderness: There is no abdominal tenderness.   Musculoskeletal:      Right lower leg: No edema.      Left lower leg: No edema.   Skin:     Coloration: Skin is not pale.      Findings: No rash.   Neurological:      General: No focal deficit present.      Mental Status: He is alert.       Cranial Nerves: No cranial nerve deficit.      Motor: No weakness.      Deep Tendon Reflexes: Reflexes normal.       Periodic myoclonic jerks on exam.  No obvious weakness.  DTRs are normal.    ED Course        Procedures               Critical Care time:  none               Results for orders placed or performed during the hospital encounter of 07/16/20 (from the past 24 hour(s))   Comprehensive metabolic panel   Result Value Ref Range    Sodium 143 133 - 144 mmol/L    Potassium 4.0 3.4 - 5.3 mmol/L    Chloride 110 (H) 94 - 109 mmol/L    Carbon Dioxide 26 20 - 32 mmol/L    Anion Gap 7 3 - 14 mmol/L    Glucose 103 (H) 70 - 99 mg/dL    Urea Nitrogen 10 7 - 30 mg/dL    Creatinine 1.12 0.66 - 1.25 mg/dL    GFR Estimate 67 >60 mL/min/[1.73_m2]    GFR Estimate If Black 78 >60 mL/min/[1.73_m2]    Calcium 9.6 8.5 - 10.1 mg/dL    Bilirubin Total 0.5 0.2 - 1.3 mg/dL    Albumin 4.0 3.4 - 5.0 g/dL    Protein Total 7.6 6.8 - 8.8 g/dL    Alkaline Phosphatase 73 40 - 150 U/L    ALT 17 0 - 70 U/L    AST 21 0 - 45 U/L       Medications   0.9% sodium chloride BOLUS (500 mLs Intravenous New Bag 7/16/20 2149)   sodium chloride 0.9% infusion (has no administration in time range)   magnesium sulfate 1 gm in 100mL D5W intermittent infusion (1 g Intravenous New Bag 7/16/20 2149)   cyproheptadine (PERIACTIN) tablet 4 mg (4 mg Oral Given 7/16/20 2148)       Assessments & Plan (with Medical Decision Making)     MDM: Juan Lindsay is a 70 year old male who presented after a history of diarrheal illness that was induced by his taking senna and then had been evaluated earlier today here for hypokalemia which was corrected return tonight because of myoclonic jerks that had been seen earlier today and recurred.  This is not associated with any significant medication changes although he does note the use of Tylenol PM and an antihistamine earlier today.  He is on Lexapro which can cause serotonin syndrome but is been on this dose for some time.   He also was on Ambien in the past but stopped that in the last week.  Had full laboratory testing including TSH renal function liver function glucose earlier today with a normal evaluation.  And no history of obvious trauma, acute infection, toxin exposure, degenerative neurologic disorder or seizure disorder.  He has no focal neurologic deficit on his examination.    The cause of these jerking episodes are unclear but I recommended that we attempt magnesium replacement here in the emergency department as he was hypokalemic earlier and magnesium is typically also low when potassium is low.  The magnesium level done as a serum may not reflect true magnesium as it only reflects 1% of magnesium stores and I therefore recommended a single dose 1 g magnesium.  As serotonin syndrome.  Can cause this, 1 trial of Periactin was attempted -neither the magnesium nor the Periactin offered benefit.    We discussed management as below with precautions for return.  A single dose of Ativan is given for home to use tonight to help with sleep and decrease these episodes.  Recommended follow-up in clinic.    I have reviewed the nursing notes.    I have reviewed the findings, diagnosis, plan and need for follow up with the patient.       New Prescriptions    No medications on file       Final diagnoses:   Acute medication-induced akathisia - I suspect this is medication related  - may be due to recently stopped  ambien  and use of tylenol PM and the antihistamine taken earlier.  this  could be related to lexapro.  take no furyher antihistamines,  May  use 1 ativan dose tonight at home and follow-up DR. Vivar.  avoid all antihistamines.  use melatonin orally daily.       7/16/2020   [facility] EMERGENCY DEPARTMENT     Jovany Ritter MD  07/17/20 0213

## 2020-07-17 NOTE — ED NOTES
"Pt presents due to inability to sit still with muscle jerking and restlessness or arms and legs, more on the left than right sided. Pt denies CP or SOB. Some heart pounding sensation. Pt reports \"its like a withdrawal from something\". Pt denies that he is out of meds or stopped taking his RX opiates, etc. No alcohol per patient.   "

## 2020-07-17 NOTE — TELEPHONE ENCOUNTER
Abram was seen in ED on 7/16/20.  Was given 1 tablet of ativan and that has helped him sleep at night.  He was hoping he could get another provider to fill a few more tablets.    ativan      Last Written Prescription Date:  7/16/20  Last Fill Quantity: 1,   # refills: 0  Last Office Visit: 7/16/20  Future Office visit:       Routing refill request to provider for review/approval because:  Drug not on the [facility] or [facility] refill protocol or controlled substance

## 2020-07-17 NOTE — DISCHARGE INSTRUCTIONS
ICD-10-CM    1. Acute medication-induced akathisia  G25.71     T50.905A     I suspect this is medication related  - may be due to recently stopped  ambien  and use of tylenol PM and the antihistamine taken earlier.  this  could be related to lexapro.  take no furyher antihistamines,  May  use 1 ativan dose tonight at home and follow-up DR. Vivar.  avoid all antihistamines.  use melatonin orally daily.

## 2020-07-18 ENCOUNTER — APPOINTMENT (OUTPATIENT)
Dept: CT IMAGING | Facility: CLINIC | Age: 70
End: 2020-07-18
Attending: FAMILY MEDICINE
Payer: MEDICARE

## 2020-07-18 ENCOUNTER — ANESTHESIA (OUTPATIENT)
Dept: EMERGENCY MEDICINE | Facility: CLINIC | Age: 70
End: 2020-07-18
Payer: MEDICARE

## 2020-07-18 ENCOUNTER — HOSPITAL ENCOUNTER (EMERGENCY)
Facility: CLINIC | Age: 70
Discharge: ANOTHER HOSPITAL | End: 2020-07-19
Attending: FAMILY MEDICINE | Admitting: FAMILY MEDICINE
Payer: MEDICARE

## 2020-07-18 ENCOUNTER — TELEPHONE (OUTPATIENT)
Dept: NEUROLOGY | Facility: CLINIC | Age: 70
End: 2020-07-18

## 2020-07-18 ENCOUNTER — ANESTHESIA EVENT (OUTPATIENT)
Dept: EMERGENCY MEDICINE | Facility: CLINIC | Age: 70
End: 2020-07-18
Payer: MEDICARE

## 2020-07-18 VITALS
WEIGHT: 145 LBS | RESPIRATION RATE: 11 BRPM | DIASTOLIC BLOOD PRESSURE: 61 MMHG | OXYGEN SATURATION: 95 % | SYSTOLIC BLOOD PRESSURE: 108 MMHG | BODY MASS INDEX: 20.76 KG/M2 | HEIGHT: 70 IN | HEART RATE: 57 BPM

## 2020-07-18 DIAGNOSIS — N17.9 ACUTE KIDNEY INJURY (H): ICD-10-CM

## 2020-07-18 DIAGNOSIS — R40.4 ALTERED LEVEL OF CONSCIOUSNESS: ICD-10-CM

## 2020-07-18 DIAGNOSIS — I95.9 ACUTE HYPOTENSION: ICD-10-CM

## 2020-07-18 DIAGNOSIS — D72.829 LEUKOCYTOSIS, UNSPECIFIED TYPE: ICD-10-CM

## 2020-07-18 DIAGNOSIS — R00.1 BRADYCARDIA: ICD-10-CM

## 2020-07-18 DIAGNOSIS — R56.9 SEIZURE-LIKE ACTIVITY (H): ICD-10-CM

## 2020-07-18 LAB
ALBUMIN SERPL-MCNC: 3.9 G/DL (ref 3.4–5)
ALP SERPL-CCNC: 71 U/L (ref 40–150)
ALT SERPL W P-5'-P-CCNC: 18 U/L (ref 0–70)
AMMONIA PLAS-SCNC: 28 UMOL/L (ref 10–50)
ANION GAP SERPL CALCULATED.3IONS-SCNC: 8 MMOL/L (ref 3–14)
AST SERPL W P-5'-P-CCNC: 23 U/L (ref 0–45)
BASE DEFICIT BLDV-SCNC: 4.5 MMOL/L
BASE DEFICIT BLDV-SCNC: 5.5 MMOL/L
BASE DEFICIT BLDV-SCNC: 6.9 MMOL/L
BASOPHILS # BLD AUTO: 0.1 10E9/L (ref 0–0.2)
BASOPHILS NFR BLD AUTO: 0.3 %
BILIRUB SERPL-MCNC: 0.5 MG/DL (ref 0.2–1.3)
BUN SERPL-MCNC: 30 MG/DL (ref 7–30)
CALCIUM SERPL-MCNC: 8.9 MG/DL (ref 8.5–10.1)
CHLORIDE SERPL-SCNC: 109 MMOL/L (ref 94–109)
CK SERPL-CCNC: 427 U/L (ref 30–300)
CO2 SERPL-SCNC: 24 MMOL/L (ref 20–32)
CREAT SERPL-MCNC: 3.23 MG/DL (ref 0.66–1.25)
CRP SERPL-MCNC: 74.8 MG/L (ref 0–8)
DIFFERENTIAL METHOD BLD: ABNORMAL
EOSINOPHIL # BLD AUTO: 0.1 10E9/L (ref 0–0.7)
EOSINOPHIL NFR BLD AUTO: 0.3 %
ERYTHROCYTE [DISTWIDTH] IN BLOOD BY AUTOMATED COUNT: 15.2 % (ref 10–15)
ERYTHROCYTE [SEDIMENTATION RATE] IN BLOOD BY WESTERGREN METHOD: 8 MM/H (ref 0–20)
ETHANOL SERPL-MCNC: <0.01 G/DL
GFR SERPL CREATININE-BSD FRML MDRD: 19 ML/MIN/{1.73_M2}
GLUCOSE CSF-MCNC: 76 MG/DL (ref 40–70)
GLUCOSE SERPL-MCNC: 102 MG/DL (ref 70–99)
HCO3 BLDV-SCNC: 22 MMOL/L (ref 21–28)
HCO3 BLDV-SCNC: 23 MMOL/L (ref 21–28)
HCO3 BLDV-SCNC: 24 MMOL/L (ref 21–28)
HCT VFR BLD AUTO: 39.8 % (ref 40–53)
HGB BLD-MCNC: 12.9 G/DL (ref 13.3–17.7)
IMM GRANULOCYTES # BLD: 0.1 10E9/L (ref 0–0.4)
IMM GRANULOCYTES NFR BLD: 0.4 %
LACTATE BLD-SCNC: 0.5 MMOL/L (ref 0.7–2)
LYMPHOCYTES # BLD AUTO: 1.8 10E9/L (ref 0.8–5.3)
LYMPHOCYTES NFR BLD AUTO: 9.7 %
MAGNESIUM SERPL-MCNC: 2.3 MG/DL (ref 1.6–2.3)
MCH RBC QN AUTO: 31.6 PG (ref 26.5–33)
MCHC RBC AUTO-ENTMCNC: 32.4 G/DL (ref 31.5–36.5)
MCV RBC AUTO: 98 FL (ref 78–100)
MONOCYTES # BLD AUTO: 0.9 10E9/L (ref 0–1.3)
MONOCYTES NFR BLD AUTO: 5 %
NEUTROPHILS # BLD AUTO: 15.2 10E9/L (ref 1.6–8.3)
NEUTROPHILS NFR BLD AUTO: 84.3 %
NRBC # BLD AUTO: 0 10*3/UL
NRBC BLD AUTO-RTO: 0 /100
O2/TOTAL GAS SETTING VFR VENT: ABNORMAL %
PCO2 BLDV: 58 MM HG (ref 40–50)
PCO2 BLDV: 58 MM HG (ref 40–50)
PCO2 BLDV: 63 MM HG (ref 40–50)
PH BLDV: 7.16 PH (ref 7.32–7.43)
PH BLDV: 7.21 PH (ref 7.32–7.43)
PH BLDV: 7.22 PH (ref 7.32–7.43)
PHOSPHATE SERPL-MCNC: 6 MG/DL (ref 2.5–4.5)
PLATELET # BLD AUTO: 274 10E9/L (ref 150–450)
PO2 BLDV: 26 MM HG (ref 25–47)
PO2 BLDV: 27 MM HG (ref 25–47)
PO2 BLDV: 37 MM HG (ref 25–47)
POTASSIUM SERPL-SCNC: 3.9 MMOL/L (ref 3.4–5.3)
PROT CSF-MCNC: 39 MG/DL (ref 15–60)
PROT SERPL-MCNC: 7.5 G/DL (ref 6.8–8.8)
RBC # BLD AUTO: 4.08 10E12/L (ref 4.4–5.9)
SODIUM SERPL-SCNC: 141 MMOL/L (ref 133–144)
WBC # BLD AUTO: 18.1 10E9/L (ref 4–11)

## 2020-07-18 PROCEDURE — 25000125 ZZHC RX 250: Performed by: NURSE ANESTHETIST, CERTIFIED REGISTERED

## 2020-07-18 PROCEDURE — C9803 HOPD COVID-19 SPEC COLLECT: HCPCS | Performed by: FAMILY MEDICINE

## 2020-07-18 PROCEDURE — 70450 CT HEAD/BRAIN W/O DYE: CPT

## 2020-07-18 PROCEDURE — 94660 CPAP INITIATION&MGMT: CPT

## 2020-07-18 PROCEDURE — 40000270 ZZH STATISTIC OXYGEN  O2DAILY TECH TIME

## 2020-07-18 PROCEDURE — 80329 ANALGESICS NON-OPIOID 1 OR 2: CPT | Performed by: FAMILY MEDICINE

## 2020-07-18 PROCEDURE — 93010 ELECTROCARDIOGRAM REPORT: CPT | Mod: Z6 | Performed by: FAMILY MEDICINE

## 2020-07-18 PROCEDURE — 85025 COMPLETE CBC W/AUTO DIFF WBC: CPT | Performed by: FAMILY MEDICINE

## 2020-07-18 PROCEDURE — 25800030 ZZH RX IP 258 OP 636: Performed by: FAMILY MEDICINE

## 2020-07-18 PROCEDURE — 80320 DRUG SCREEN QUANTALCOHOLS: CPT | Performed by: FAMILY MEDICINE

## 2020-07-18 PROCEDURE — 25000125 ZZHC RX 250: Performed by: FAMILY MEDICINE

## 2020-07-18 PROCEDURE — 96367 TX/PROPH/DG ADDL SEQ IV INF: CPT | Mod: 59 | Performed by: FAMILY MEDICINE

## 2020-07-18 PROCEDURE — 40000275 ZZH STATISTIC RCP TIME EA 10 MIN

## 2020-07-18 PROCEDURE — 87040 BLOOD CULTURE FOR BACTERIA: CPT | Performed by: FAMILY MEDICINE

## 2020-07-18 PROCEDURE — 96368 THER/DIAG CONCURRENT INF: CPT | Mod: 59 | Performed by: FAMILY MEDICINE

## 2020-07-18 PROCEDURE — 85652 RBC SED RATE AUTOMATED: CPT | Performed by: FAMILY MEDICINE

## 2020-07-18 PROCEDURE — 86617 LYME DISEASE ANTIBODY: CPT | Performed by: FAMILY MEDICINE

## 2020-07-18 PROCEDURE — 37000011 ZZH ANESTHESIA WARD SERVICE

## 2020-07-18 PROCEDURE — 96361 HYDRATE IV INFUSION ADD-ON: CPT | Mod: 59 | Performed by: FAMILY MEDICINE

## 2020-07-18 PROCEDURE — 84484 ASSAY OF TROPONIN QUANT: CPT | Performed by: FAMILY MEDICINE

## 2020-07-18 PROCEDURE — 83605 ASSAY OF LACTIC ACID: CPT | Performed by: FAMILY MEDICINE

## 2020-07-18 PROCEDURE — 87529 HSV DNA AMP PROBE: CPT | Performed by: FAMILY MEDICINE

## 2020-07-18 PROCEDURE — 40000671 ZZH STATISTIC ANESTHESIA CASE

## 2020-07-18 PROCEDURE — 82803 BLOOD GASES ANY COMBINATION: CPT | Performed by: FAMILY MEDICINE

## 2020-07-18 PROCEDURE — 80307 DRUG TEST PRSMV CHEM ANLYZR: CPT | Performed by: FAMILY MEDICINE

## 2020-07-18 PROCEDURE — 87205 SMEAR GRAM STAIN: CPT | Performed by: FAMILY MEDICINE

## 2020-07-18 PROCEDURE — 82533 TOTAL CORTISOL: CPT | Performed by: FAMILY MEDICINE

## 2020-07-18 PROCEDURE — 93005 ELECTROCARDIOGRAM TRACING: CPT | Performed by: FAMILY MEDICINE

## 2020-07-18 PROCEDURE — 82945 GLUCOSE OTHER FLUID: CPT | Performed by: FAMILY MEDICINE

## 2020-07-18 PROCEDURE — 84157 ASSAY OF PROTEIN OTHER: CPT | Performed by: FAMILY MEDICINE

## 2020-07-18 PROCEDURE — 84100 ASSAY OF PHOSPHORUS: CPT | Performed by: FAMILY MEDICINE

## 2020-07-18 PROCEDURE — 87015 SPECIMEN INFECT AGNT CONCNTJ: CPT | Performed by: FAMILY MEDICINE

## 2020-07-18 PROCEDURE — 87070 CULTURE OTHR SPECIMN AEROBIC: CPT | Mod: XU | Performed by: FAMILY MEDICINE

## 2020-07-18 PROCEDURE — 81001 URINALYSIS AUTO W/SCOPE: CPT | Performed by: FAMILY MEDICINE

## 2020-07-18 PROCEDURE — 86789 WEST NILE VIRUS ANTIBODY: CPT | Performed by: FAMILY MEDICINE

## 2020-07-18 PROCEDURE — 82550 ASSAY OF CK (CPK): CPT | Performed by: FAMILY MEDICINE

## 2020-07-18 PROCEDURE — 86788 WEST NILE VIRUS AB IGM: CPT | Performed by: FAMILY MEDICINE

## 2020-07-18 PROCEDURE — 99291 CRITICAL CARE FIRST HOUR: CPT | Mod: 25 | Performed by: FAMILY MEDICINE

## 2020-07-18 PROCEDURE — 62270 DX LMBR SPI PNXR: CPT

## 2020-07-18 PROCEDURE — 83735 ASSAY OF MAGNESIUM: CPT | Performed by: FAMILY MEDICINE

## 2020-07-18 PROCEDURE — 82140 ASSAY OF AMMONIA: CPT | Performed by: FAMILY MEDICINE

## 2020-07-18 PROCEDURE — 96365 THER/PROPH/DIAG IV INF INIT: CPT | Performed by: FAMILY MEDICINE

## 2020-07-18 PROCEDURE — 96375 TX/PRO/DX INJ NEW DRUG ADDON: CPT | Mod: 59 | Performed by: FAMILY MEDICINE

## 2020-07-18 PROCEDURE — 51703 INSERT BLADDER CATH COMPLEX: CPT | Mod: 59 | Performed by: FAMILY MEDICINE

## 2020-07-18 PROCEDURE — 80053 COMPREHEN METABOLIC PANEL: CPT | Performed by: FAMILY MEDICINE

## 2020-07-18 PROCEDURE — 86140 C-REACTIVE PROTEIN: CPT | Performed by: FAMILY MEDICINE

## 2020-07-18 PROCEDURE — 25000128 H RX IP 250 OP 636: Performed by: FAMILY MEDICINE

## 2020-07-18 PROCEDURE — 89050 BODY FLUID CELL COUNT: CPT | Performed by: FAMILY MEDICINE

## 2020-07-18 PROCEDURE — 99285 EMERGENCY DEPT VISIT HI MDM: CPT | Mod: 25 | Performed by: FAMILY MEDICINE

## 2020-07-18 PROCEDURE — U0003 INFECTIOUS AGENT DETECTION BY NUCLEIC ACID (DNA OR RNA); SEVERE ACUTE RESPIRATORY SYNDROME CORONAVIRUS 2 (SARS-COV-2) (CORONAVIRUS DISEASE [COVID-19]), AMPLIFIED PROBE TECHNIQUE, MAKING USE OF HIGH THROUGHPUT TECHNOLOGIES AS DESCRIBED BY CMS-2020-01-R: HCPCS | Performed by: FAMILY MEDICINE

## 2020-07-18 RX ORDER — NOREPINEPHRINE BITARTRATE 0.06 MG/ML
0.03-0.4 INJECTION, SOLUTION INTRAVENOUS CONTINUOUS
Status: DISCONTINUED | OUTPATIENT
Start: 2020-07-18 | End: 2020-07-19 | Stop reason: HOSPADM

## 2020-07-18 RX ORDER — ETOMIDATE 2 MG/ML
19.8 INJECTION INTRAVENOUS ONCE
Status: DISCONTINUED | OUTPATIENT
Start: 2020-07-18 | End: 2020-07-18 | Stop reason: CLARIF

## 2020-07-18 RX ORDER — NALOXONE HYDROCHLORIDE 0.4 MG/ML
.2-.4 INJECTION, SOLUTION INTRAMUSCULAR; INTRAVENOUS; SUBCUTANEOUS
Status: DISCONTINUED | OUTPATIENT
Start: 2020-07-18 | End: 2020-07-19 | Stop reason: HOSPADM

## 2020-07-18 RX ORDER — LIDOCAINE HYDROCHLORIDE 10 MG/ML
INJECTION, SOLUTION EPIDURAL; INFILTRATION; INTRACAUDAL; PERINEURAL PRN
Status: DISCONTINUED | OUTPATIENT
Start: 2020-07-18 | End: 2020-07-18

## 2020-07-18 RX ORDER — SODIUM CHLORIDE 9 MG/ML
1000 INJECTION, SOLUTION INTRAVENOUS CONTINUOUS
Status: DISCONTINUED | OUTPATIENT
Start: 2020-07-18 | End: 2020-07-19 | Stop reason: HOSPADM

## 2020-07-18 RX ADMIN — SODIUM CHLORIDE 1000 ML: 9 INJECTION, SOLUTION INTRAVENOUS at 20:02

## 2020-07-18 RX ADMIN — VANCOMYCIN HYDROCHLORIDE 1500 MG: 10 INJECTION, POWDER, LYOPHILIZED, FOR SOLUTION INTRAVENOUS at 23:57

## 2020-07-18 RX ADMIN — TAZOBACTAM SODIUM AND PIPERACILLIN SODIUM 3.38 G: 375; 3 INJECTION, SOLUTION INTRAVENOUS at 23:52

## 2020-07-18 RX ADMIN — LIDOCAINE HYDROCHLORIDE 5 ML: 10 INJECTION, SOLUTION EPIDURAL; INFILTRATION; INTRACAUDAL; PERINEURAL at 20:37

## 2020-07-18 RX ADMIN — Medication 0.03 MCG/KG/MIN: at 23:28

## 2020-07-18 RX ADMIN — MIDAZOLAM 2 MG: 1 INJECTION INTRAMUSCULAR; INTRAVENOUS at 18:43

## 2020-07-18 RX ADMIN — SODIUM CHLORIDE 1000 ML: 9 INJECTION, SOLUTION INTRAVENOUS at 18:44

## 2020-07-18 RX ADMIN — NALXONE HYDROCHLORIDE 0.4 MG: 0.4 INJECTION INTRAMUSCULAR; INTRAVENOUS; SUBCUTANEOUS at 23:50

## 2020-07-18 RX ADMIN — SODIUM CHLORIDE 500 ML: 9 INJECTION, SOLUTION INTRAVENOUS at 23:17

## 2020-07-18 RX ADMIN — LEVETIRACETAM 1500 MG: 100 INJECTION, SOLUTION INTRAVENOUS at 22:04

## 2020-07-18 RX ADMIN — FOLIC ACID: 5 INJECTION, SOLUTION INTRAMUSCULAR; INTRAVENOUS; SUBCUTANEOUS at 19:21

## 2020-07-18 ASSESSMENT — LIFESTYLE VARIABLES: TOBACCO_USE: 1

## 2020-07-18 ASSESSMENT — ENCOUNTER SYMPTOMS
TREMORS: 1
CONFUSION: 1
FEVER: 0

## 2020-07-18 ASSESSMENT — MIFFLIN-ST. JEOR: SCORE: 1433.97

## 2020-07-18 NOTE — LETTER
July 22, 2020        Juan Lindsay  [id number] [facility] [id number]    This letter provides a written record that you were tested for COVID-19 on 7/18/2020.       Your result was negative. This means that we didn t find the virus that causes COVID-19 in your sample. A test may show negative when you do actually have the virus. This can happen when the virus is in the early stages of infection, before you feel illness symptoms.    If you have symptoms   Stay home and away from others (self-isolate) until you meet ALL of the guidelines below:    You ve had no fever--and no medicine that reduces fever--for 3 full days (72 hours). And      Your other symptoms have gotten better. For example, your cough or breathing has improved. And     At least 10 days have passed since your symptoms started.    During this time:    Stay home. Don t go to work, school or anywhere else.     Stay in your own room, including for meals. Use your own bathroom if you can.    Stay away from others in your home. No hugging, kissing or shaking hands. No visitors.    Clean  high touch  surfaces often (doorknobs, counters, handles, etc.). Use a household cleaning spray or wipes. You can find a full list on the EPA website at www.epa.gov/pesticide-registration/list-n-disinfectants-use-against-sars-cov-2.    Cover your mouth and nose with a mask, tissue or washcloth to avoid spreading germs.    Wash your hands and face often with soap and water.    Going back to work  Check with your employer for any guidelines to follow for going back to work.    Employers: This document serves as formal notice that your employee tested negative for COVID-19, as of the testing date shown above.

## 2020-07-18 NOTE — ED NOTES
Pt states stopped taking lexapro yesterday - he thinks - per wife, patient is VERY confused and this is somewhat new today.

## 2020-07-18 NOTE — ED PROVIDER NOTES
History     Chief Complaint   Patient presents with     Extremity Weakness     Pt states that this is the 3rd visit in 2 days - here  with tremers and 'dropping things' - was told his K was low and Mg was low - today with worsening weakness.     Anxiety     HPI  Juan Lindsay is a 70 year old male who \  Presents with a history of tobacco abuse prior alcohol abuse in remission for the last 30 years, obstructive sleep apnea, iron deficiency anemia who had been seen for hypokalemia earlier on 7/16/2020.  He had diarrhea after taking senna earlier in the week and likely a result of the hypokalemia.  Had myoclonic jerks at that time and also persisted to the time of evaluation in the emergency department that evening when he returned.  Unclear etiology reassuring laboratory testing at that time.  Some of this appeared to be possibly be akathisia .  Given a dose of Ativan that evening and responded well until this morning when at home he began to experience confusion, staring episodes, and additional myoclonic jerks.  No associated fever.  No obvious head injury.  No history of seizures previously.    BP Readings from Last 6 Encounters:   07/18/20 93/52   07/16/20 (!) 188/111   07/16/20 (!) 191/91   03/05/20 (!) 174/83   09/05/19 122/76   08/05/19 132/73       Allergies:  Allergies   Allergen Reactions     Nka [No Known Allergies]        Problem List:    Patient Active Problem List    Diagnosis Date Noted     Infection of prosthetic knee joint (H) 04/29/2017     Priority: Medium     Aftercare following joint replacement surgery 03/17/2017     Priority: Medium     Prepatellar bursitis 03/08/2017     Priority: Medium     Long QT interval 08/03/2016     Priority: Medium     Myofascial pain 05/08/2015     Priority: Medium     Advanced directives, counseling/discussion 01/12/2015     Priority: Medium     Advance Care Planning:   ACP Review and Resources Provided:  Reviewed chart for advance care plan.  Juna Lindsay has  no plan or code status on file however states presence of ACP document. Copy requested. Added by Keisha Hernandez on 1/12/2015             Anemia 11/03/2014     Priority: Medium     Iron deficiency anemia 04/30/2014     Priority: Medium     Encounter for other administrative examinations 03/04/2014     Priority: Medium     Overview:   [facility] with Dr. Rupesh Roach       Obstructive sleep apnea 07/01/2013     Priority: Medium     Arthralgia of shoulder 07/23/2012     Priority: Medium     Osteopenia 10/19/2011     Priority: Medium     10/19/2011 - recommended 5oo mg calcium with vit D tid. recheck in two years.       Moderate major depression (H) 10/12/2011     Priority: Medium     Sedative, hypnotic or anxiolytic abuse, continuous (H) 03/04/2010     Priority: Medium     He came to see me one week ago complaining of significant increase in his anxiety. He told me that he had been off the Valium 5 mg twice a day since last May.  This was not true.  He had just seen Dr. Mallory at [facility] on February 8, 2010 and had been prescribed Valium 5 mg twice a day. During the visit I told him I would give him Ativan 1 mg up to three times a day for one months for love #90 pills.  I gave him a referral to psychiatry.  I will not be prescribing narcotics or tranquilizers in the future.       Anxiety state 09/18/2006     Priority: Medium     Problem list name updated by automated process. Provider to review       PAIN BACK, LOW 06/19/2006     Priority: Medium     Chronic - seen at [facility] 1/13/11 - continue same MS contin, norco, ambien dosing  F/u in March       Sleep apnea      Priority: Medium     Problem list name updated by automated process. Provider to review       ALCOH DEP not active for 28 years      Priority: Medium     Tobacco use disorder      Priority: Medium     Allergic rhinitis due to other allergen      Priority: Medium     Degeneration of intervertebral disc of lumbosacral region  05/25/2006     Priority: Medium     Health Care Home 06/27/2013     Priority: Low     EMERGENCY CARE PLAN  June 27, 2013: No current Care Coordination follow up planned. Please refer if Care Coordination services are needed.    Presenting Problem Signs and Symptoms Treatment Plan   Questions or concerns   during clinic hours   I will call my clinic directly:  [facility]  [address] [place] [address]  [phone].    Questions or concerns outside clinic hours   I will call the 24 hour nurse line at   [phone] or [address].   Need to schedule an appointment   I will call the 24 hour scheduling team at [phone] or my clinic directly at [phone].    Same day treatment     I will call my clinic first, nurse line if after hours, urgent care and express care if needed.   Clinic care coordination services (regular clinic hours)     I will call a clinic care coordinator directly:     Festus Osman RN  Mon, Tues, Fri - [phone]  Wed, Thurs - [phone]    Ania Moyer :    [phone]    Or call my clinic at [phone] and ask to speak with care coordination.   Crisis Services: Behavioral or Mental Health  [facility] 24 Hour Phone Line  [phone]    [facility] 24 Hour Crisis Services  [phone]    [facility] (Behavioral Health Providers) Network [phone]    [facility]   [phone]       Emergency treatment -- Immediately    CAll 911             Past Medical History:    Past Medical History:   Diagnosis Date     Allergic rhinitis due to other allergen      Arthritis      Depressive disorder, not elsewhere classified      Other and unspecified alcohol dependence, unspecified drinking behavior      Tobacco use disorder      Unspecified sleep apnea        Past Surgical History:    Past Surgical History:   Procedure Laterality Date     ABDOMEN SURGERY  1998     APPENDECTOMY  1998     COLONOSCOPY  10/8/1998    Colonoscopy     COLONOSCOPY N/A  2019    Procedure: COLONOSCOPY;  Surgeon: René Luna MD;  Location: [facility]     ORTHOPEDIC SURGERY  2018     SOFT TISSUE SURGERY      left elbow     SURGICAL HISTORY OF -   1999    Uvulopalatopharyngoplasty with Tonsillecotomy     SURGICAL HISTORY OF -   2003    Septoplasty     SURGICAL HISTORY OF -       L Elbow     SURGICAL HISTORY OF -   2004     L Knee Arthroscopy     SURGICAL HISTORY OF -   10/2004    L Rotator Cuff Repair     SURGICAL HISTORY OF -       Ruptured appendix       Family History:    Family History   Problem Relation Age of Onset     Lipids Mother      Alzheimer Disease Mother               Anemia Mother         Questionable     Hyperlipidemia Mother      Cerebrovascular Disease Mother      Anxiety Disorder Mother      Unknown/Adopted Mother      Lipids Father      Cerebrovascular Disease Father          - mouth cancer     Cancer Father      Neurologic Disorder Brother         atacksia     Substance Abuse Brother         Ataxia,      Cerebrovascular Disease Brother         Ataxia, alchol abuse     Alcohol/Drug Brother         Alcohal and Rx abuse. Suicide      Psychotic Disorder Brother      Thyroid Disease Brother      Cerebrovascular Disease Brother         suicide     Anxiety Disorder Brother      Substance Abuse Brother         suicide     Thyroid Disease Brother        Social History:  Marital Status:   [2]  Social History     Tobacco Use     Smoking status: Light Tobacco Smoker     Packs/day: 0.50     Years: 25.00     Pack years: 12.50     Types: Cigarettes     Start date: 1980     Last attempt to quit: 2014     Years since quittin.8     Smokeless tobacco: Never Used     Tobacco comment: can't get my mind to agree with my feelings   Substance Use Topics     Alcohol use: No     Drug use: No        Medications:    aspirin  MG EC tablet  Cholecalciferol (VITAMIN D PO)  Cyanocobalamin (B-12 PO)  Cyanocobalamin (VITAMIN B12  "PO)  escitalopram (LEXAPRO) 20 MG tablet  ferrous gluconate (FERGON) 324 (38 Fe) MG tablet  fluticasone (FLONASE) 50 MCG/ACT nasal spray  gabapentin (NEURONTIN) 600 MG tablet  hydrocodone-acetaminophen (NORCO)  MG per tablet  hydrOXYzine (ATARAX) 25 MG tablet  ibuprofen (ADVIL/MOTRIN) 600 MG tablet  ipratropium (ATROVENT) 0.06 % nasal spray  LORazepam (ATIVAN) 1 MG tablet  methocarbamol (ROBAXIN) 750 MG tablet  morphine (MS CONTIN) 15 MG 12 hr tablet  Nutritional Supplements (SENIOR MENS FORMULA OR)  potassium chloride ER (KLOR-CON M) 20 MEQ CR tablet  varenicline (CHANTIX STARTING MONTH PAK) 0.5 MG X 11 & 1 MG X 42 tablet  zolpidem (AMBIEN) 10 MG tablet          Review of Systems   Unable to perform ROS: Mental status change   Constitutional: Negative for fever.   Neurological: Positive for tremors.   Psychiatric/Behavioral: Positive for confusion.       Physical Exam   BP: 100/63  Pulse: 90  Resp: 16  Height: 177.8 cm (5' 10\")  Weight: 65.8 kg (145 lb)  SpO2: 92 %      Physical Exam  Constitutional:       General: He is in acute distress.      Appearance: He is not diaphoretic.   HENT:      Head: Atraumatic.   Eyes:      Conjunctiva/sclera: Conjunctivae normal.   Neck:      Musculoskeletal: Neck supple.   Cardiovascular:      Rate and Rhythm: Normal rate and regular rhythm.      Pulses: Normal pulses.      Heart sounds: Normal heart sounds.   Pulmonary:      Effort: Pulmonary effort is normal. No respiratory distress.      Breath sounds: Normal breath sounds. No stridor. No wheezing or rhonchi.   Abdominal:      General: Bowel sounds are normal.   Musculoskeletal:      Right lower leg: No edema.      Left lower leg: No edema.   Skin:     Coloration: Skin is not pale.      Findings: No rash.       Patient is staring and extraocular movements are sporadic he will attend to voice and look over at me and answer some questions but then his gaze will very and he will have myoclonic jerks that occur sporadically.  " Difficult to test motor strength.  DTRs reduced.      ED Course        Procedures                 EKG Interpretation:      Interpreted by Jovany Ritter MD  EKG done at 1904 hrs. demonstrates a sinus rhythm at 50 bpm with a normal axis and no ST change.  T wave inversions in lead V3, V4, V5, V6 and flattening in 2 3 aVF.  Normal R progression.  No Q waves.  Normal intervals.  Normal conduction.  No ectopy.  Impression sinus rhythm 58 bpm]      Critical Care time:  was 60 minutes for this patient excluding procedures.               Results for orders placed or performed during the hospital encounter of 07/18/20 (from the past 24 hour(s))   Salicylate level   Result Value Ref Range    Salicylate Level 4 mg/dL   Acetaminophen level   Result Value Ref Range    Acetaminophen Level <2 mg/L   CBC with platelets, differential   Result Value Ref Range    WBC 18.1 (H) 4.0 - 11.0 10e9/L    RBC Count 4.08 (L) 4.4 - 5.9 10e12/L    Hemoglobin 12.9 (L) 13.3 - 17.7 g/dL    Hematocrit 39.8 (L) 40.0 - 53.0 %    MCV 98 78 - 100 fl    MCH 31.6 26.5 - 33.0 pg    MCHC 32.4 31.5 - 36.5 g/dL    RDW 15.2 (H) 10.0 - 15.0 %    Platelet Count 274 150 - 450 10e9/L    Diff Method Automated Method     % Neutrophils 84.3 %    % Lymphocytes 9.7 %    % Monocytes 5.0 %    % Eosinophils 0.3 %    % Basophils 0.3 %    % Immature Granulocytes 0.4 %    Nucleated RBCs 0 0 /100    Absolute Neutrophil 15.2 (H) 1.6 - 8.3 10e9/L    Absolute Lymphocytes 1.8 0.8 - 5.3 10e9/L    Absolute Monocytes 0.9 0.0 - 1.3 10e9/L    Absolute Eosinophils 0.1 0.0 - 0.7 10e9/L    Absolute Basophils 0.1 0.0 - 0.2 10e9/L    Abs Immature Granulocytes 0.1 0 - 0.4 10e9/L    Absolute Nucleated RBC 0.0    Magnesium   Result Value Ref Range    Magnesium 2.3 1.6 - 2.3 mg/dL   Comprehensive metabolic panel   Result Value Ref Range    Sodium 141 133 - 144 mmol/L    Potassium 3.9 3.4 - 5.3 mmol/L    Chloride 109 94 - 109 mmol/L    Carbon Dioxide 24 20 - 32 mmol/L    Anion Gap 8 3 - 14  mmol/L    Glucose 102 (H) 70 - 99 mg/dL    Urea Nitrogen 30 7 - 30 mg/dL    Creatinine 3.23 (H) 0.66 - 1.25 mg/dL    GFR Estimate 19 (L) >60 mL/min/[1.73_m2]    GFR Estimate If Black 22 (L) >60 mL/min/[1.73_m2]    Calcium 8.9 8.5 - 10.1 mg/dL    Bilirubin Total 0.5 0.2 - 1.3 mg/dL    Albumin 3.9 3.4 - 5.0 g/dL    Protein Total 7.5 6.8 - 8.8 g/dL    Alkaline Phosphatase 71 40 - 150 U/L    ALT 18 0 - 70 U/L    AST 23 0 - 45 U/L   Alcohol ethyl   Result Value Ref Range    Ethanol g/dL <0.01 <0.01 g/dL   Phosphorus   Result Value Ref Range    Phosphorus 6.0 (H) 2.5 - 4.5 mg/dL   Erythrocyte sedimentation rate auto   Result Value Ref Range    Sed Rate 8 0 - 20 mm/h   CRP Inflammation   Result Value Ref Range    CRP Inflammation 74.8 (H) 0.0 - 8.0 mg/L   CK total   Result Value Ref Range    CK Total 427 (H) 30 - 300 U/L   Lactic acid whole blood   Result Value Ref Range    Lactic Acid 0.5 (L) 0.7 - 2.0 mmol/L   Blood culture    Specimen: Blood    Left Arm   Result Value Ref Range    Specimen Description Blood Left Arm     Culture Micro PENDING    Blood gas venous   Result Value Ref Range    Ph Venous 7.22 (L) 7.32 - 7.43 pH    PCO2 Venous 58 (H) 40 - 50 mm Hg    PO2 Venous 37 25 - 47 mm Hg    Bicarbonate Venous 24 21 - 28 mmol/L    Base Deficit Venous 4.5 mmol/L    FIO2 HIDE    Blood culture    Specimen: Blood    Left Arm   Result Value Ref Range    Specimen Description Blood Left Arm     Culture Micro PENDING    Head CT w/o contrast    Narrative    CT OF THE HEAD WITHOUT CONTRAST   7/18/2020 7:57 PM     COMPARISON: Head CT 7/12/2018    HISTORY:  Seizure activity     TECHNIQUE:  Axial CT images of the head from the skull base to the  vertex were acquired without IV contrast.    FINDINGS:   INTRACRANIAL CONTENTS: No intracranial hemorrhage, extraaxial  collection, or mass effect.  No CT evidence of acute infarct.   Mild  presumed chronic small vessel ischemic change with mild generalized  volume loss..    VISUALIZED  ORBITS/SINUSES/MASTOIDS: No significant orbital  abnormality.  No significant paranasal sinus mucosal disease. No  significant middle ear or mastoid effusion.    OSSEOUS STRUCTURES/SOFT TISSUES: No significant abnormality.      Impression    IMPRESSION:  1.  No mass, hemorrhage or stroke.  2.  Mild presumed chronic small vessel ischemic change and generalized  volume loss.  3.  No change.    Radiation dose for this scan was reduced using automated exposure  control, adjustment of the mA and/or kV according to patient size, or  iterative reconstruction technique    EVANS NARAYAN MD   Blood gas venous   Result Value Ref Range    Ph Venous 7.16 (LL) 7.32 - 7.43 pH    PCO2 Venous 63 (H) 40 - 50 mm Hg    PO2 Venous 26 25 - 47 mm Hg    Bicarbonate Venous 22 21 - 28 mmol/L    Base Deficit Venous 6.9 mmol/L    FIO2 17%    Glucose CSF:   Result Value Ref Range    Glucose CSF 76 (H) 40 - 70 mg/dL   Protein total CSF:   Result Value Ref Range    Protein Total CSF 39 15 - 60 mg/dL   Gram stain    Specimen: Cerebrospinal fluid    TUBE 3   Result Value Ref Range    Specimen Description Cerebrospinal fluid TUBE 3     Gram Stain NOES    CSF Culture Aerobic Bacterial    Specimen: Cerebrospinal fluid    TUBE 3   Result Value Ref Range    Specimen Description Cerebrospinal fluid TUBE 3     Culture Micro PENDING    Cell count with differential CSF:   Result Value Ref Range    WBC CSF 0 0 - 5 /uL    RBC CSF 0 0 - 2 /uL    Tube Number 4 #    Volume 2 mL    Color CSF Colorless CLRL^Colorless    Appearance CSF Clear CLER^Clear   Ammonia (on ice)   Result Value Ref Range    Ammonia 28 10 - 50 umol/L   Troponin I   Result Value Ref Range    Troponin I ES 0.027 0.000 - 0.045 ug/L   Blood gas venous   Result Value Ref Range    Ph Venous 7.21 (L) 7.32 - 7.43 pH    PCO2 Venous 58 (H) 40 - 50 mm Hg    PO2 Venous 27 25 - 47 mm Hg    Bicarbonate Venous 23 21 - 28 mmol/L    Base Deficit Venous 5.5 mmol/L    FIO2 88% RA, O2 reapplied at 2 LPM at  time of draw    UA with Microscopic   Result Value Ref Range    Color Urine Yellow     Appearance Urine Slightly Cloudy     Glucose Urine Negative NEG^Negative mg/dL    Bilirubin Urine Negative NEG^Negative    Ketones Urine Negative NEG^Negative mg/dL    Specific Gravity Urine 1.024 1.003 - 1.035    Blood Urine Moderate (A) NEG^Negative    pH Urine 5.0 5.0 - 7.0 pH    Protein Albumin Urine 30 (A) NEG^Negative mg/dL    Urobilinogen mg/dL 0.0 0.0 - 2.0 mg/dL    Nitrite Urine Negative NEG^Negative    Leukocyte Esterase Urine Negative NEG^Negative    Source Midstream Urine     WBC Urine 1 0 - 5 /HPF    RBC Urine 20 (H) 0 - 2 /HPF    Squamous Epithelial /HPF Urine <1 0 - 1 /HPF    Mucous Urine Present (A) NEG^Negative /LPF    Hyaline Casts 25 (H) 0 - 2 /LPF   Drug abuse screen 77 urine (FL, RH, SH)   Result Value Ref Range    Amphetamine Qual Urine Negative NEG^Negative    Barbiturates Qual Urine Negative NEG^Negative    Benzodiazepine Qual Urine Positive (A) NEG^Negative    Cannabinoids Qual Urine Negative NEG^Negative    Cocaine Qual Urine Negative NEG^Negative    Opiates Qualitative Urine Positive (A) NEG^Negative    PCP Qual Urine Negative NEG^Negative       Medications   0.9% sodium chloride BOLUS (1,000 mLs Intravenous New Bag 7/18/20 1844)   sodium chloride 0.9% infusion (has no administration in time range)   sodium chloride 0.9 % 1,000 mL with Infuvite Adult 10 mL, thiamine 100 mg, folic acid 1 mg infusion (has no administration in time range)   midazolam (VERSED) injection 2 mg (2 mg Intravenous Given 7/18/20 1843)   naloxone 0.4          Assessments & Plan (with Medical Decision Making)     MDM:Juan Lindsay is a 70 year old male with presented 2 days ago for hypokalemia after having had diarrhea secondary to senna that he had taken.  He had his potassium replaced and had some signs of myoclonic jerking at the time of that initial visit and then an additional visit that evening.  On his return to the  emergency department that evening, differential had included serotonergic type causes and Periactin was attempted but no improvement.  He did improve with Ativan.  Per his wife he was better yesterday but then today she noted altered level of consciousness and increased jerking which may be seizure-like activity and on his arrival in the emergency department at [facility] he appears somnolent, but awake and able and confused.  No signs of external trauma, afebrile, no obvious ingestions.  Medical record is reviewed.  No obvious causes identified for his symptoms.  Broad-based evaluation for possible seizures versus myoclonic jerks, and altered level of consciousness possible metabolic encephalopathy.  Contacted poison control regarding findings after labs also revealed an acute kidney injury, mild elevation of CK enzyme.   Opioid use was not initially found in the medical record and he was empirically treated with thiamine and banana bag and glucose was normal.    No known signs of infection, but elevated white blood cell count with increased CRP and lumbar puncture obtained as well as CT head.  Blood cultures obtained, urinalysis, urine culture obtained and lactic acid normal.    On his initial presentation it was unclear if his presenting findings were consistent with seizure and therefore Versed was given.  This resulted mostly in somnolence although his myoclonic jerks subsided.  There is nontypical post ictal.  However and he remained somnolent for more than an hour.  During that time he had no obvious respiratory depression on exam with bilateral breath sounds and normal capnography but he was on oxygen during that time and pH on VBG dropped as low as 7.16.  He was rechecked after his oxygen is been titrated off and pH should come up to 7.21.  There were no signs of apnea although he is quite somnolent.  I can easily awaken the patient and he had a GCS of 12.  No indication at that point for intubation but  he was started on BiPAP as he did have a gag reflex.    I spoke to neurology [facility] first Dr. Horvath than Dr. Dyer and ultimately Dr. Austin as well as briefly to [facility] hospitalist regarding the patient's findings.  It appeared that EEG would be beneficial given his presentation but that his acute kidney injury leukocytosis CRP increase associated with the altered level of consciousness would benefit from medicine as primary service.  Dr. Austin accepted for transfer.  At that point VBG repeat was pending.  Systolic blood pressures had been lower but had responded to IV fluids and were at least in the upper 90s systolic.  Mental status continued to be with GCS of 12 and appeared to have intact airway reflexes and therefore BiPAP was initiated.      Shortly before transfer to the [facility] the patient became once again more somnolent, which have been fluctuating throughout his stay, and also now hypotensive into the 80s after BiPAP is been started.  Bedside ultrasound demonstrated normal cardiac activity with no pericardial effusion and left chambers larger than right chambers, IVC at least 2 cm and not easily collapsing.  With the patient's declining mental status had called to ready for intubation, and in preparation for this, had initiated norepinephrine.   He was also empirically given a dose of Zosyn and vancomycin after cultures were obtained.  Spinal fluid per lab who I called demonstrated no obvious organisms on Gram stain and no obvious cells seen on CSF.    With his fluctuating mental status [place] monitoring program was reviewed and demonstrates high doses of opioid prescriptions.  This includes MS Contin.  Narcan was administered and resulted in the patient's mental status improving to the point he can answer questions.  Intubation was not pursued at that point.  His systolic blood pressures improved with this and norepinephrine was tapered off.    He still continues  with myoclonic jerking acute kidney injury, leukocytosis and markedly increased CRP of unclear etiology.  I suspect however that his acute kidney injury has led to decreased renal excretion of his medications including opioids, gabapentin and associated with this his use of benzodiazepines have likely compounded his somnolence.            I have reviewed the nursing notes.    I have reviewed the findings, diagnosis, plan and need for follow up with the patient.       New Prescriptions    No medications on file       Final diagnoses:   Altered level of consciousness   Acute kidney injury (H)   Seizure-like activity (H)   Leukocytosis, unspecified type   Acute hypotension   Bradycardia       7/18/2020   [facility] EMERGENCY DEPARTMENT     Jovany Ritter MD  07/19/20 0131

## 2020-07-18 NOTE — PROGRESS NOTES
Pt arrives with confusion, lethargy, jerking, sleep apnea also noted.   Was here this week for electrolyte replacement.     Pt manages own multiple pain medications.

## 2020-07-19 ENCOUNTER — APPOINTMENT (OUTPATIENT)
Dept: MRI IMAGING | Facility: CLINIC | Age: 70
DRG: 682 | End: 2020-07-19
Attending: STUDENT IN AN ORGANIZED HEALTH CARE EDUCATION/TRAINING PROGRAM
Payer: MEDICARE

## 2020-07-19 ENCOUNTER — HOSPITAL ENCOUNTER (INPATIENT)
Facility: CLINIC | Age: 70
LOS: 3 days | Discharge: HOME HEALTH | DRG: 682 | End: 2020-07-22
Attending: PSYCHIATRY & NEUROLOGY | Admitting: PSYCHIATRY & NEUROLOGY
Payer: MEDICARE

## 2020-07-19 ENCOUNTER — APPOINTMENT (OUTPATIENT)
Dept: NEUROLOGY | Facility: CLINIC | Age: 70
DRG: 682 | End: 2020-07-19
Attending: PSYCHIATRY & NEUROLOGY
Payer: MEDICARE

## 2020-07-19 DIAGNOSIS — G93.40 ENCEPHALOPATHY: ICD-10-CM

## 2020-07-19 DIAGNOSIS — I10 HYPERTENSION, UNSPECIFIED TYPE: Primary | ICD-10-CM

## 2020-07-19 DIAGNOSIS — G47.9 SLEEP DISORDER: ICD-10-CM

## 2020-07-19 DIAGNOSIS — Z79.891 CHRONIC PRESCRIPTION OPIATE USE: ICD-10-CM

## 2020-07-19 PROBLEM — Z47.1 AFTERCARE FOLLOWING JOINT REPLACEMENT SURGERY: Status: RESOLVED | Noted: 2017-03-17 | Resolved: 2020-07-19

## 2020-07-19 PROBLEM — Z47.1 AFTERCARE FOLLOWING BILATERAL KNEE JOINT REPLACEMENT SURGERY: Status: ACTIVE | Noted: 2017-03-17

## 2020-07-19 PROBLEM — Z96.653 AFTERCARE FOLLOWING BILATERAL KNEE JOINT REPLACEMENT SURGERY: Status: ACTIVE | Noted: 2017-03-17

## 2020-07-19 PROBLEM — Z96.659 INFECTION OF PROSTHETIC KNEE JOINT (H): Status: RESOLVED | Noted: 2017-04-29 | Resolved: 2020-07-19

## 2020-07-19 PROBLEM — T84.59XA INFECTION OF PROSTHETIC KNEE JOINT (H): Status: RESOLVED | Noted: 2017-04-29 | Resolved: 2020-07-19

## 2020-07-19 PROBLEM — Z96.651 S/P TOTAL KNEE ARTHROPLASTY, RIGHT: Status: ACTIVE | Noted: 2018-09-21

## 2020-07-19 LAB
ALBUMIN UR-MCNC: 30 MG/DL
AMPHETAMINES UR QL SCN: NEGATIVE
ANION GAP SERPL CALCULATED.3IONS-SCNC: 3 MMOL/L (ref 3–14)
ANION GAP SERPL CALCULATED.3IONS-SCNC: 4 MMOL/L (ref 3–14)
ANION GAP SERPL CALCULATED.3IONS-SCNC: 6 MMOL/L (ref 3–14)
APAP SERPL-MCNC: <2 MG/L (ref 10–20)
APPEARANCE CSF: CLEAR
APPEARANCE UR: ABNORMAL
BARBITURATES UR QL: NEGATIVE
BASE DEFICIT BLDA-SCNC: 6.8 MMOL/L
BASE DEFICIT BLDA-SCNC: 7.2 MMOL/L
BENZODIAZ UR QL: POSITIVE
BILIRUB UR QL STRIP: NEGATIVE
BUN SERPL-MCNC: 22 MG/DL (ref 7–30)
BUN SERPL-MCNC: 22 MG/DL (ref 7–30)
BUN SERPL-MCNC: 24 MG/DL (ref 7–30)
CALCIUM SERPL-MCNC: 8 MG/DL (ref 8.5–10.1)
CALCIUM SERPL-MCNC: 8 MG/DL (ref 8.5–10.1)
CALCIUM SERPL-MCNC: 8.2 MG/DL (ref 8.5–10.1)
CANNABINOIDS UR QL SCN: NEGATIVE
CHLORIDE SERPL-SCNC: 118 MMOL/L (ref 94–109)
CHLORIDE SERPL-SCNC: 119 MMOL/L (ref 94–109)
CHLORIDE SERPL-SCNC: 120 MMOL/L (ref 94–109)
CK SERPL-CCNC: 269 U/L (ref 30–300)
CO2 SERPL-SCNC: 20 MMOL/L (ref 20–32)
CO2 SERPL-SCNC: 21 MMOL/L (ref 20–32)
CO2 SERPL-SCNC: 22 MMOL/L (ref 20–32)
COCAINE UR QL: NEGATIVE
COLOR CSF: COLORLESS
COLOR UR AUTO: YELLOW
CORTIS SERPL-MCNC: 29.7 UG/DL (ref 4–22)
CREAT SERPL-MCNC: 1.46 MG/DL (ref 0.66–1.25)
CREAT SERPL-MCNC: 1.63 MG/DL (ref 0.66–1.25)
CREAT SERPL-MCNC: 2.19 MG/DL (ref 0.66–1.25)
ERYTHROCYTE [DISTWIDTH] IN BLOOD BY AUTOMATED COUNT: 15.5 % (ref 10–15)
GFR SERPL CREATININE-BSD FRML MDRD: 30 ML/MIN/{1.73_M2}
GFR SERPL CREATININE-BSD FRML MDRD: 43 ML/MIN/{1.73_M2}
GFR SERPL CREATININE-BSD FRML MDRD: 49 ML/MIN/{1.73_M2}
GLUCOSE BLDC GLUCOMTR-MCNC: 76 MG/DL (ref 70–99)
GLUCOSE BLDC GLUCOMTR-MCNC: 93 MG/DL (ref 70–99)
GLUCOSE SERPL-MCNC: 110 MG/DL (ref 70–99)
GLUCOSE SERPL-MCNC: 86 MG/DL (ref 70–99)
GLUCOSE SERPL-MCNC: 87 MG/DL (ref 70–99)
GLUCOSE UR STRIP-MCNC: NEGATIVE MG/DL
GRAM STN SPEC: NORMAL
HCO3 BLD-SCNC: 20 MMOL/L (ref 21–28)
HCO3 BLD-SCNC: 20 MMOL/L (ref 21–28)
HCT VFR BLD AUTO: 33.4 % (ref 40–53)
HGB BLD-MCNC: 10.2 G/DL (ref 13.3–17.7)
HGB UR QL STRIP: ABNORMAL
HSV1 DNA CSF QL NAA+PROBE: NOT DETECTED
HSV2 DNA CSF QL NAA+PROBE: NOT DETECTED
HYALINE CASTS #/AREA URNS LPF: 25 /LPF (ref 0–2)
KETONES UR STRIP-MCNC: NEGATIVE MG/DL
LEUKOCYTE ESTERASE UR QL STRIP: NEGATIVE
MAGNESIUM SERPL-MCNC: 1.9 MG/DL (ref 1.6–2.3)
MAGNESIUM SERPL-MCNC: 1.9 MG/DL (ref 1.6–2.3)
MCH RBC QN AUTO: 30.6 PG (ref 26.5–33)
MCHC RBC AUTO-ENTMCNC: 30.5 G/DL (ref 31.5–36.5)
MCV RBC AUTO: 100 FL (ref 78–100)
MICROBIOLOGIST REVIEW: NORMAL
MUCOUS THREADS #/AREA URNS LPF: PRESENT /LPF
NITRATE UR QL: NEGATIVE
O2/TOTAL GAS SETTING VFR VENT: ABNORMAL %
O2/TOTAL GAS SETTING VFR VENT: ABNORMAL %
OPIATES UR QL SCN: POSITIVE
PCO2 BLD: 42 MM HG (ref 35–45)
PCO2 BLD: 47 MM HG (ref 35–45)
PCP UR QL SCN: NEGATIVE
PH BLD: 7.24 PH (ref 7.35–7.45)
PH BLD: 7.28 PH (ref 7.35–7.45)
PH UR STRIP: 5 PH (ref 5–7)
PHOSPHATE SERPL-MCNC: 4.2 MG/DL (ref 2.5–4.5)
PLATELET # BLD AUTO: 198 10E9/L (ref 150–450)
PO2 BLD: 64 MM HG (ref 80–105)
PO2 BLD: 78 MM HG (ref 80–105)
POTASSIUM SERPL-SCNC: 3.8 MMOL/L (ref 3.4–5.3)
POTASSIUM SERPL-SCNC: 3.8 MMOL/L (ref 3.4–5.3)
POTASSIUM SERPL-SCNC: 4.1 MMOL/L (ref 3.4–5.3)
RBC # BLD AUTO: 3.33 10E12/L (ref 4.4–5.9)
RBC # CSF MANUAL: 0 /UL (ref 0–2)
RBC #/AREA URNS AUTO: 20 /HPF (ref 0–2)
SALICYLATES SERPL-MCNC: 4 MG/DL
SODIUM SERPL-SCNC: 143 MMOL/L (ref 133–144)
SODIUM SERPL-SCNC: 144 MMOL/L (ref 133–144)
SODIUM SERPL-SCNC: 146 MMOL/L (ref 133–144)
SOURCE: ABNORMAL
SP GR UR STRIP: 1.02 (ref 1–1.03)
SPECIMEN SOURCE: NORMAL
SPECIMEN VOL CSF: 2 ML
SQUAMOUS #/AREA URNS AUTO: <1 /HPF (ref 0–1)
TROPONIN I SERPL-MCNC: 0.03 UG/L (ref 0–0.04)
TUBE # CSF: 4 #
UROBILINOGEN UR STRIP-MCNC: 0 MG/DL (ref 0–2)
WBC # BLD AUTO: 12.9 10E9/L (ref 4–11)
WBC # CSF MANUAL: 0 /UL (ref 0–5)
WBC #/AREA URNS AUTO: 1 /HPF (ref 0–5)

## 2020-07-19 PROCEDURE — 86850 RBC ANTIBODY SCREEN: CPT | Performed by: STUDENT IN AN ORGANIZED HEALTH CARE EDUCATION/TRAINING PROGRAM

## 2020-07-19 PROCEDURE — 82803 BLOOD GASES ANY COMBINATION: CPT | Performed by: STUDENT IN AN ORGANIZED HEALTH CARE EDUCATION/TRAINING PROGRAM

## 2020-07-19 PROCEDURE — 82550 ASSAY OF CK (CPK): CPT | Performed by: PSYCHIATRY & NEUROLOGY

## 2020-07-19 PROCEDURE — 00000146 ZZHCL STATISTIC GLUCOSE BY METER IP

## 2020-07-19 PROCEDURE — 40000275 ZZH STATISTIC RCP TIME EA 10 MIN

## 2020-07-19 PROCEDURE — 82803 BLOOD GASES ANY COMBINATION: CPT | Performed by: PSYCHIATRY & NEUROLOGY

## 2020-07-19 PROCEDURE — 93010 ELECTROCARDIOGRAM REPORT: CPT | Performed by: INTERNAL MEDICINE

## 2020-07-19 PROCEDURE — 86901 BLOOD TYPING SEROLOGIC RH(D): CPT | Performed by: STUDENT IN AN ORGANIZED HEALTH CARE EDUCATION/TRAINING PROGRAM

## 2020-07-19 PROCEDURE — 80048 BASIC METABOLIC PNL TOTAL CA: CPT | Performed by: STUDENT IN AN ORGANIZED HEALTH CARE EDUCATION/TRAINING PROGRAM

## 2020-07-19 PROCEDURE — 83735 ASSAY OF MAGNESIUM: CPT | Performed by: PSYCHIATRY & NEUROLOGY

## 2020-07-19 PROCEDURE — 20000004 ZZH R&B ICU UMMC

## 2020-07-19 PROCEDURE — 93005 ELECTROCARDIOGRAM TRACING: CPT

## 2020-07-19 PROCEDURE — 95711 VEEG 2-12 HR UNMONITORED: CPT

## 2020-07-19 PROCEDURE — 25000128 H RX IP 250 OP 636: Performed by: STUDENT IN AN ORGANIZED HEALTH CARE EDUCATION/TRAINING PROGRAM

## 2020-07-19 PROCEDURE — 84100 ASSAY OF PHOSPHORUS: CPT | Performed by: PSYCHIATRY & NEUROLOGY

## 2020-07-19 PROCEDURE — 80048 BASIC METABOLIC PNL TOTAL CA: CPT | Performed by: PSYCHIATRY & NEUROLOGY

## 2020-07-19 PROCEDURE — 40000556 ZZH STATISTIC PERIPHERAL IV START W US GUIDANCE

## 2020-07-19 PROCEDURE — 70553 MRI BRAIN STEM W/O & W/DYE: CPT

## 2020-07-19 PROCEDURE — 36415 COLL VENOUS BLD VENIPUNCTURE: CPT | Performed by: STUDENT IN AN ORGANIZED HEALTH CARE EDUCATION/TRAINING PROGRAM

## 2020-07-19 PROCEDURE — 86900 BLOOD TYPING SEROLOGIC ABO: CPT | Performed by: STUDENT IN AN ORGANIZED HEALTH CARE EDUCATION/TRAINING PROGRAM

## 2020-07-19 PROCEDURE — 25000132 ZZH RX MED GY IP 250 OP 250 PS 637: Mod: GY | Performed by: STUDENT IN AN ORGANIZED HEALTH CARE EDUCATION/TRAINING PROGRAM

## 2020-07-19 PROCEDURE — A9585 GADOBUTROL INJECTION: HCPCS | Performed by: RADIOLOGY

## 2020-07-19 PROCEDURE — 83735 ASSAY OF MAGNESIUM: CPT | Performed by: STUDENT IN AN ORGANIZED HEALTH CARE EDUCATION/TRAINING PROGRAM

## 2020-07-19 PROCEDURE — 25000132 ZZH RX MED GY IP 250 OP 250 PS 637: Mod: GY | Performed by: PSYCHIATRY & NEUROLOGY

## 2020-07-19 PROCEDURE — 25800030 ZZH RX IP 258 OP 636: Performed by: STUDENT IN AN ORGANIZED HEALTH CARE EDUCATION/TRAINING PROGRAM

## 2020-07-19 PROCEDURE — 25500064 ZZH RX 255 OP 636: Performed by: RADIOLOGY

## 2020-07-19 PROCEDURE — 40000048 ZZH STATISTIC DAILY SWAN MONITORING

## 2020-07-19 PROCEDURE — 85027 COMPLETE CBC AUTOMATED: CPT | Performed by: PSYCHIATRY & NEUROLOGY

## 2020-07-19 PROCEDURE — 36600 WITHDRAWAL OF ARTERIAL BLOOD: CPT

## 2020-07-19 PROCEDURE — 94660 CPAP INITIATION&MGMT: CPT

## 2020-07-19 PROCEDURE — 36415 COLL VENOUS BLD VENIPUNCTURE: CPT | Performed by: PSYCHIATRY & NEUROLOGY

## 2020-07-19 RX ORDER — NALOXONE HYDROCHLORIDE 0.4 MG/ML
.1-.4 INJECTION, SOLUTION INTRAMUSCULAR; INTRAVENOUS; SUBCUTANEOUS
Status: DISCONTINUED | OUTPATIENT
Start: 2020-07-19 | End: 2020-07-22 | Stop reason: HOSPADM

## 2020-07-19 RX ORDER — POTASSIUM CHLORIDE 1.5 G/1.58G
20-40 POWDER, FOR SOLUTION ORAL
Status: DISCONTINUED | OUTPATIENT
Start: 2020-07-19 | End: 2020-07-22 | Stop reason: HOSPADM

## 2020-07-19 RX ORDER — FLUTICASONE PROPIONATE 50 MCG
1 SPRAY, SUSPENSION (ML) NASAL DAILY
Status: DISCONTINUED | OUTPATIENT
Start: 2020-07-19 | End: 2020-07-22 | Stop reason: HOSPADM

## 2020-07-19 RX ORDER — POTASSIUM CHLORIDE 7.45 MG/ML
10 INJECTION INTRAVENOUS
Status: DISCONTINUED | OUTPATIENT
Start: 2020-07-19 | End: 2020-07-22 | Stop reason: HOSPADM

## 2020-07-19 RX ORDER — NICOTINE 21 MG/24HR
1 PATCH, TRANSDERMAL 24 HOURS TRANSDERMAL DAILY
Status: DISCONTINUED | OUTPATIENT
Start: 2020-07-20 | End: 2020-07-19

## 2020-07-19 RX ORDER — GABAPENTIN 400 MG/1
400 CAPSULE ORAL 3 TIMES DAILY
COMMUNITY

## 2020-07-19 RX ORDER — MORPHINE SULFATE 15 MG/1
30 TABLET ORAL EVERY 4 HOURS PRN
Status: DISCONTINUED | OUTPATIENT
Start: 2020-07-19 | End: 2020-07-20

## 2020-07-19 RX ORDER — MAGNESIUM SULFATE HEPTAHYDRATE 40 MG/ML
2 INJECTION, SOLUTION INTRAVENOUS DAILY PRN
Status: DISCONTINUED | OUTPATIENT
Start: 2020-07-19 | End: 2020-07-22 | Stop reason: HOSPADM

## 2020-07-19 RX ORDER — BUSPIRONE HYDROCHLORIDE 10 MG/1
10 TABLET ORAL 3 TIMES DAILY
COMMUNITY

## 2020-07-19 RX ORDER — MAGNESIUM SULFATE HEPTAHYDRATE 40 MG/ML
4 INJECTION, SOLUTION INTRAVENOUS EVERY 4 HOURS PRN
Status: DISCONTINUED | OUTPATIENT
Start: 2020-07-19 | End: 2020-07-22 | Stop reason: HOSPADM

## 2020-07-19 RX ORDER — POTASSIUM CHLORIDE 29.8 MG/ML
20 INJECTION INTRAVENOUS
Status: DISCONTINUED | OUTPATIENT
Start: 2020-07-19 | End: 2020-07-22 | Stop reason: HOSPADM

## 2020-07-19 RX ORDER — POTASSIUM CHLORIDE 750 MG/1
20-40 TABLET, EXTENDED RELEASE ORAL
Status: DISCONTINUED | OUTPATIENT
Start: 2020-07-19 | End: 2020-07-22 | Stop reason: HOSPADM

## 2020-07-19 RX ORDER — POTASSIUM CL/LIDO/0.9 % NACL 10MEQ/0.1L
10 INTRAVENOUS SOLUTION, PIGGYBACK (ML) INTRAVENOUS
Status: DISCONTINUED | OUTPATIENT
Start: 2020-07-19 | End: 2020-07-22 | Stop reason: HOSPADM

## 2020-07-19 RX ORDER — MORPHINE SULFATE 30 MG/1
30 TABLET, FILM COATED, EXTENDED RELEASE ORAL 4 TIMES DAILY PRN
Status: DISCONTINUED | OUTPATIENT
Start: 2020-07-19 | End: 2020-07-19

## 2020-07-19 RX ORDER — HEPARIN SODIUM 5000 [USP'U]/.5ML
5000 INJECTION, SOLUTION INTRAVENOUS; SUBCUTANEOUS EVERY 8 HOURS
Status: DISCONTINUED | OUTPATIENT
Start: 2020-07-19 | End: 2020-07-22 | Stop reason: HOSPADM

## 2020-07-19 RX ORDER — GADOBUTROL 604.72 MG/ML
0.1 INJECTION INTRAVENOUS ONCE
Status: COMPLETED | OUTPATIENT
Start: 2020-07-19 | End: 2020-07-19

## 2020-07-19 RX ORDER — ESCITALOPRAM OXALATE 20 MG/1
30 TABLET ORAL DAILY
COMMUNITY

## 2020-07-19 RX ORDER — SODIUM CHLORIDE, SODIUM LACTATE, POTASSIUM CHLORIDE, CALCIUM CHLORIDE 600; 310; 30; 20 MG/100ML; MG/100ML; MG/100ML; MG/100ML
INJECTION, SOLUTION INTRAVENOUS CONTINUOUS
Status: DISCONTINUED | OUTPATIENT
Start: 2020-07-19 | End: 2020-07-21

## 2020-07-19 RX ORDER — ACETAMINOPHEN 325 MG/1
650 TABLET ORAL EVERY 4 HOURS PRN
Status: DISCONTINUED | OUTPATIENT
Start: 2020-07-19 | End: 2020-07-22 | Stop reason: HOSPADM

## 2020-07-19 RX ORDER — NICOTINE 21 MG/24HR
1 PATCH, TRANSDERMAL 24 HOURS TRANSDERMAL DAILY
Status: DISCONTINUED | OUTPATIENT
Start: 2020-07-19 | End: 2020-07-22 | Stop reason: HOSPADM

## 2020-07-19 RX ADMIN — MORPHINE SULFATE 30 MG: 15 TABLET ORAL at 16:27

## 2020-07-19 RX ADMIN — SODIUM CHLORIDE, POTASSIUM CHLORIDE, SODIUM LACTATE AND CALCIUM CHLORIDE: 600; 310; 30; 20 INJECTION, SOLUTION INTRAVENOUS at 23:03

## 2020-07-19 RX ADMIN — ACETAMINOPHEN 650 MG: 325 TABLET, FILM COATED ORAL at 16:27

## 2020-07-19 RX ADMIN — SODIUM CHLORIDE 1000 ML: 9 INJECTION, SOLUTION INTRAVENOUS at 10:28

## 2020-07-19 RX ADMIN — MAGNESIUM SULFATE IN WATER 2 G: 40 INJECTION, SOLUTION INTRAVENOUS at 12:28

## 2020-07-19 RX ADMIN — MORPHINE SULFATE 30 MG: 15 TABLET ORAL at 20:40

## 2020-07-19 RX ADMIN — ACETAMINOPHEN 650 MG: 325 TABLET, FILM COATED ORAL at 09:38

## 2020-07-19 RX ADMIN — FLUTICASONE PROPIONATE 1 SPRAY: 50 SPRAY, METERED NASAL at 19:01

## 2020-07-19 RX ADMIN — POTASSIUM CHLORIDE 20 MEQ: 750 TABLET, EXTENDED RELEASE ORAL at 05:05

## 2020-07-19 RX ADMIN — NICOTINE 1 PATCH: 21 PATCH, EXTENDED RELEASE TRANSDERMAL at 21:50

## 2020-07-19 RX ADMIN — HEPARIN SODIUM 5000 UNITS: 5000 INJECTION, SOLUTION INTRAVENOUS; SUBCUTANEOUS at 05:05

## 2020-07-19 RX ADMIN — MORPHINE SULFATE 30 MG: 15 TABLET ORAL at 09:38

## 2020-07-19 RX ADMIN — HEPARIN SODIUM 5000 UNITS: 5000 INJECTION, SOLUTION INTRAVENOUS; SUBCUTANEOUS at 21:50

## 2020-07-19 RX ADMIN — HEPARIN SODIUM 5000 UNITS: 5000 INJECTION, SOLUTION INTRAVENOUS; SUBCUTANEOUS at 16:27

## 2020-07-19 RX ADMIN — GADOBUTROL 7 ML: 604.72 INJECTION INTRAVENOUS at 15:15

## 2020-07-19 RX ADMIN — SODIUM CHLORIDE, POTASSIUM CHLORIDE, SODIUM LACTATE AND CALCIUM CHLORIDE: 600; 310; 30; 20 INJECTION, SOLUTION INTRAVENOUS at 03:18

## 2020-07-19 ASSESSMENT — PAIN DESCRIPTION - DESCRIPTORS
DESCRIPTORS: ACHING
DESCRIPTORS: ACHING
DESCRIPTORS: ACHING;CONSTANT

## 2020-07-19 ASSESSMENT — MIFFLIN-ST. JEOR: SCORE: 1476.25

## 2020-07-19 ASSESSMENT — ACTIVITIES OF DAILY LIVING (ADL)
ADLS_ACUITY_SCORE: 12
ADLS_ACUITY_SCORE: 12
ADLS_ACUITY_SCORE: 13
ADLS_ACUITY_SCORE: 13
ADLS_ACUITY_SCORE: 12

## 2020-07-19 NOTE — ANESTHESIA PREPROCEDURE EVALUATION
Anesthesia Pre-Procedure Evaluation    Patient: Juan Lindsay   MRN: [id number] : 1950          Preoperative Diagnosis: * No pre-op diagnosis entered *    * No procedures listed *    Past Medical History:   Diagnosis Date     Allergic rhinitis due to other allergen      Arthritis      Depressive disorder, not elsewhere classified      Other and unspecified alcohol dependence, unspecified drinking behavior      Tobacco use disorder      Unspecified sleep apnea      Past Surgical History:   Procedure Laterality Date     ABDOMEN SURGERY       APPENDECTOMY       COLONOSCOPY  10/8/1998    Colonoscopy     COLONOSCOPY N/A 2019    Procedure: COLONOSCOPY;  Surgeon: René Luna MD;  Location: [facility]     ORTHOPEDIC SURGERY  2018     SOFT TISSUE SURGERY      left elbow     SURGICAL HISTORY OF -   1999    Uvulopalatopharyngoplasty with Tonsillecotomy     SURGICAL HISTORY OF -   2003    Septoplasty     SURGICAL HISTORY OF -       L Elbow     SURGICAL HISTORY OF -   2004     L Knee Arthroscopy     SURGICAL HISTORY OF -   10/2004    L Rotator Cuff Repair     SURGICAL HISTORY OF -       Ruptured appendix       Anesthesia Evaluation     . Pt has had prior anesthetic.            ROS/MED HX    ENT/Pulmonary:     (+)sleep apnea, allergic rhinitis, tobacco use, Current use , . .    Neurologic:     (+)other neuro (Confusion, myoclonic jerks)     Cardiovascular:         METS/Exercise Tolerance:     Hematologic:     (+) Anemia, -      Musculoskeletal:   (+) arthritis,  -       GI/Hepatic:     (+) Other GI/Hepatic Distant hx ETOH dependence      Renal/Genitourinary:         Endo:         Psychiatric:     (+) psychiatric history depression and anxiety      Infectious Disease:         Malignancy:         Other:    (+) H/O Chronic Pain,                        Physical Exam  Normal systems: cardiovascular and pulmonary    Airway   Mallampati: II  TM distance: >3 FB  Neck ROM: full    Dental   (+)  "missing    Cardiovascular       Pulmonary             Lab Results   Component Value Date    WBC 18.1 (H) 07/18/2020    HGB 12.9 (L) 07/18/2020    HCT 39.8 (L) 07/18/2020     07/18/2020    CRP 74.8 (H) 07/18/2020    SED 8 07/18/2020     07/18/2020    POTASSIUM 3.9 07/18/2020    CHLORIDE 109 07/18/2020    CO2 24 07/18/2020    BUN 30 07/18/2020    CR 3.23 (H) 07/18/2020     (H) 07/18/2020    MARY 8.9 07/18/2020    PHOS 6.0 (H) 07/18/2020    MAG 2.3 07/18/2020    ALBUMIN 3.9 07/18/2020    PROTTOTAL 7.5 07/18/2020    ALT 18 07/18/2020    AST 23 07/18/2020    ALKPHOS 71 07/18/2020    BILITOTAL 0.5 07/18/2020    PTT 28 09/18/2003    INR 0.97 09/18/2003    TSH 0.62 07/16/2020       Preop Vitals  BP Readings from Last 3 Encounters:   07/18/20 123/77   07/16/20 (!) 188/111   07/16/20 (!) 191/91    Pulse Readings from Last 3 Encounters:   07/18/20 68   07/16/20 59   07/16/20 51      Resp Readings from Last 3 Encounters:   07/18/20 18   07/16/20 18   07/16/20 19    SpO2 Readings from Last 3 Encounters:   07/18/20 97%   07/16/20 94%   07/16/20 99%      Temp Readings from Last 1 Encounters:    Ht Readings from Last 1 Encounters:   07/18/20 1.778 m (5' 10\")      Wt Readings from Last 1 Encounters:   07/18/20 65.8 kg (145 lb)    Estimated body mass index is 20.81 kg/m  as calculated from the following:    Height as of this encounter: 1.778 m (5' 10\").    Weight as of this encounter: 65.8 kg (145 lb).       Anesthesia Plan  Procedure only, no anesthetic delivered        Plan for     Risks, benefits, alternatives of LP discussed with wife, she agrees to proceed.        Postoperative Care      Consents  Anesthetic plan, risks, benefits and alternatives discussed with:  Spouse..                 HESHAM Mitchell CRNA  "

## 2020-07-19 NOTE — PHARMACY-VANCOMYCIN DOSING SERVICE
Pharmacy Vancomycin Initial Note  Date of Service 2020  Patient's  1950  70 year old, male    Indication: Sepsis    Current estimated CrCl = Estimated Creatinine Clearance: 20.4 mL/min (A) (based on SCr of 3.23 mg/dL (H)).    Creatinine for last 3 days  2020: 11:34 AM Creatinine 1.17 mg/dL;  9:03 PM Creatinine 1.12 mg/dL  2020:  5:39 PM Creatinine 3.23 mg/dL    Recent Vancomycin Level(s) for last 3 days  No results found for requested labs within last 72 hours.      Vancomycin IV Administrations (past 72 hours)      No vancomycin orders with administrations in past 72 hours.                Nephrotoxins and other renal medications (From now, onward)    Start     Dose/Rate Route Frequency Ordered Stop    20 2345  vancomycin (VANCOCIN) 1,500 mg in sodium chloride 0.9 % 250 mL intermittent infusion      1,500 mg  over 90 Minutes Intravenous ONCE 20 2344      20 2343  piperacillin-tazobactam (ZOSYN) infusion 3.375 g      3.375 g  over 1 Hours Intravenous EVERY 6 HOURS 20 2342      20 2319  norepinephrine (LEVOPHED) 16 mg in  mL infusion      0.03-0.4 mcg/kg/min × 65.8 kg  1.9-24.7 mL/hr  Intravenous CONTINUOUS 20 2319            Contrast Orders - past 72 hours (72h ago, onward)    None                Plan:  1.  Start vancomycin  1500 mg IV once. Would recommend q48h with current CrCl (however, SCr acutely elevated), further dosing per [facility] pharmacist.    2.  Goal Trough Level: 15-20 mg/L   3.  Pharmacy will check trough levels as appropriate in 1-3 Days.    4. Serum creatinine levels will be ordered daily for the first week of therapy and at least twice weekly for subsequent weeks.    5. Arnold method utilized to dose vancomycin therapy: Method 1    Rupesh Navas [facility]

## 2020-07-19 NOTE — TELEPHONE ENCOUNTER
Received call from outside ED about this patient.  Presented a few days ago with myoclonus and was hypokalemic and got ativan which reportedly improved symptoms.  Now coming back with encephalopathy and myoclonus.      Labs show a pH of 7.16 and wbc count of 18 and creatinine which has risen from 1.2 to over 3 in the past 48 hours.      I suspect this is toxic metabolic encephalopathy related to findings above.  My suspicion is low for underlying seizure disorder, but possible he could have seizures symptomatic to his metabolic disturbances.    If ED feels EEG monitoring is indicated would recommend admission to medicine to work up his underlying metabolic disturbances.  Neurology can consult for EEG and AED management.  As there is a question of seizure it would be reasonable to load with a one time dose of Keppra in the ED.     Chayo Dyer, DO  Neurology

## 2020-07-19 NOTE — PHARMACY-ADMISSION MEDICATION HISTORY
Admission medication history interview status for the 7/19/2020 admission is complete. See Epic admission navigator for allergy information, pharmacy, prior to admission medications and immunization status.     Medication history interview sources:  Chart review, Octavia Hooks MNPMP    Changes made to PTA medication list (reason)  Added: Buspirone  Deleted: Augmentin (hisotircal), cyanocobalamin (duplicate), ibuprofen, ipratropium nasal spray (filled 4/16/2020 x14d supply), methocarbamol (last filled 5/11/2020 x30day supsply)  Changed:    -lexapro (taking 30mg/d)  -gabapentin (400mg TID)  -Norco (changed PRN SIG)  -hydroxyzine (added SIG)  -morphine (changed to 3 tabs qam, 3 tabs in the afternoon, and 2 tablets at bedtime)  -zolpidem (taking HS PRN)    Additional medication history information (including reliability of information, actions taken by pharmacist):None    Any medication marked with last dose = UNKNOWN I was NOT able to verify with insurance fills. Attempted to call wife to clarify home meds but did not answer phone call. May attempt later if possible.   - Was unable to verify Aspirin, vitamins/supplements, verenicline (filled 4/24/2020x28d supply so likely not taking)     Left lorazepam on list but only received 1 tablet on 7/18/2020      Prior to Admission medications    Medication Sig Last Dose Taking? Auth Provider   busPIRone (BUSPAR) 10 MG tablet Take 10 mg by mouth 3 times daily  Yes Unknown, Entered By History   escitalopram (LEXAPRO) 20 MG tablet Take 30 mg by mouth daily (takes 1.5 tablets)  Yes Unknown, Entered By History   fluticasone (FLONASE) 50 MCG/ACT nasal spray Spray 1-2 sprays into both nostrils daily  Yes Radha Vivar MD   gabapentin (NEURONTIN) 400 MG capsule Take 400 mg by mouth 3 times daily  Yes Unknown, Entered By History   hydrocodone-acetaminophen (NORCO)  MG per tablet Take 1-2 tablets by mouth every 3 hours as needed for moderate to severe pain Maximum  10 tablets per day  Yes Reported, Patient   hydrOXYzine (ATARAX) 25 MG tablet Take 50 mg by mouth 3 times daily as needed for anxiety   Yes Reported, Patient   LORazepam (ATIVAN) 1 MG tablet Take 0.5-1 tablets (0.5-1 mg) by mouth once as needed for muscle spasms Do not operate a vehicle after taking this medication.  Yes Jovany Ritter MD   morphine (MS CONTIN) 15 MG 12 hr tablet Take 3 tablets every morning, 3 tablets in the afternoon, and 2 tablets at bedtime  Yes Radha Vivar MD   potassium chloride ER (KLOR-CON M) 20 MEQ CR tablet Take 1 tablet (20 mEq) by mouth 2 times daily for 5 days  Yes Chetna Rabago PA-C   zolpidem (AMBIEN) 10 MG tablet Take 10 mg by mouth nightly as needed for sleep   Yes Reported, Patient   aspirin  MG EC tablet Take 325 mg by mouth 2 times daily (with meals)  Unknown at Unknown time  Reported, Patient   Cholecalciferol (VITAMIN D PO) Take 1 tablet by mouth daily Unknown at Unknown time  Reported, Patient   Cyanocobalamin (B-12 PO) Take 1 tablet by mouth daily Unknown at Unknown time  Reported, Patient   ferrous gluconate (FERGON) 324 (38 Fe) MG tablet Take 1 tablet (324 mg) by mouth daily (with breakfast) Unknown at Unknown time  Víctor Grant MD   Nutritional Supplements (SENIOR MENS FORMULA OR) Take 1 tablet by mouth daily Reported on 4/26/2017 Unknown at Unknown time  Reported, Patient   varenicline (CHANTIX STARTING MONTH PAK) 0.5 MG X 11 & 1 MG X 42 tablet Take 0.5 mg tab daily for 3 days, THEN 0.5 mg tab twice daily for 4 days, THEN 1 mg twice daily. Unknown at Unknown time  Radha Vivar MD         Medication history completed by: Sharmin Wall [facility]

## 2020-07-19 NOTE — PROGRESS NOTES
Admitted/transferred from: [facility]  Reason for admission/transfer: ICU care/EEG monitoring  2 RN skin assessment: completed by Paula EDWARDS and Manish JAQUEZ  Result of skin assessment and interventions/actions: Left great toe swollen and bruised  Height, weight, drug calc weight: done  Patient belongings: Clothing and shoes in closet. Patient is wearing two gold rings and a wristwatch    MDRO education (if applicable): N/A

## 2020-07-19 NOTE — PROGRESS NOTES
[facility]  NeuroCritical Care Progress Note    Patient Name:  Juan Lindsay  MRN:  [id number]    :  1950  Date of Service:  2020  Primary care provider:  Radha Vivar        24 HOUR EVENTS:  No acute events overnight. Patient reports that he is still having occasional myoclonic jerks and cramping in his extremities. Nurses report some tea colored urine. Patient had an episode of sinus bradycardia to 36 today.     ASSESSMENT/PLAN:  Juan Lindsay is a 70 year old with a history of alcohol abuse and chronic high dose opiate use who presented to OSH  w/ staring spells, confusion, myoclonic jerks, and somnolence that improved with administration of Narcan. Possible diagnoses include opiate, or other drug induced myoclonus, or myoclonic seizures. Less likely myoclonic seizure given the patient's improvement following Narcan at OSH. MRI ordered to further evaluate and EEG to assess for any seizure activity.     Patient with HR as low as 36 today, EKG showed sinus bradycardia and Echocardiogram ordered to evaluate.    Changes Today:    - MRI  - Echocardiogram ordered  - EEG     NEURO:  #Drug induced myoclonus vs low-suspicion of myoclonic seizure  - LP at OSH WNL  - Improved following narcan  - MRI today  - EEG following MRI      CARDIO:  #Sinus Bradycardia  Patient with HR as low as 36 today  - EKG showed sinus bradycardia  - Echocardiogram ordered    PULM:  Satting well on room air  - Continuous oxygen monitoring  - O2 PRN    #ANGELA  - BiPAP while sleeping    GI:  No current concerns  - Diet: Regular diet  - Bowel regimen available PRN  - GI ppx not indicated      RENAL:  #CLAIRE  #Rhabdomyalysis   - Cr elevated to 3.23 on admit, downtrending now at 1.46  - CK elevated to 427 on admit, downtrending now at 269  - 1 L NS today  -  mL/hr  - Monitor with daily BMPs  - Electrolyte replacement available PRN    #Hyperphosphatemia - RESOLVED  6.0 on admit, now 4.2.  "Likely d/t Rhabdo    ENDO:  No active issues  - Maintain euglycemia    HEME:  #Leukocytosis  - WBC 18.1 on admit, downtrending    ID:  Afebrile with downtrending leukocytosis  - Culture if spikes fever    Checklist:  PPx:   - DVT: Heparin and SCDs  - GI: Not indicated  Code: Full    Dispo: Transfer to  and general neurology    Patient discussed with Fellow Dr. Lawson, and Attending Dr. Austin .    Chriss Hernandez MD  PGY-1 Neurology Resident  2020  ______________________________    24 HOUR VITAL SIGNS SUMMARY:   Temperatures:  Current - Temp: 97.4  F (36.3  C); Max - Temp  Av.8  F (36.6  C)  Min: 97.4  F (36.3  C)  Max: 98  F (36.7  C)  Respiration range: Resp  Av.5  Min: 8  Max: 30  Pulse range: Pulse  Av.8  Min: 42  Max: 90  Blood pressure range: Systolic (24hrs), Av , Min:78 , Max:131   ; Diastolic (24hrs), Av, Min:43, Max:105    Pulse oximetry range: SpO2  Av.6 %  Min: 80 %  Max: 100 %    VENTILATOR SETTINGS:  Resp: 20    Intake/Output Summary (Last 24 hours) at 2020 1352  Last data filed at 2020 1300  Gross per 24 hour   Intake 1347.5 ml   Output 925 ml   Net 422.5 ml       BP - Mean:  [] 76    PHYSICAL EXAMINATION:   /53   Pulse (!) 49   Temp 97.4  F (36.3  C) (Oral)   Resp 20   Ht 1.778 m (5' 10\")   Wt 70 kg (154 lb 5.2 oz)   SpO2 95%   BMI 22.14 kg/m      General: Awake and alert, Lying in bed, NAD, fully ooperative  HEENT: Normocephalic, atraumatic, no epistaxis, no oral lesions  Resp: Breathing comfortably on room air  Cardio: RRR  Extremities: Warm and well perfused, peripheral pulses present, no edema  Psych: Normal mood and affect    Neuro:  Mental status: Awake, alert, attentive; oriented to person, place, and time  Speech: Fluent, comprehension and repetition intact; No dysarthria  Cranial nerves: Visual fields intact, Eyes conjugate, PERRL, EOMI w/ normal and smooth pursuit, face expression symmetric, facial sensation intact to light touch " and symmetric, hearing intact to conversation, difficult to assess for shoulder strength d/t pain, palate rise symmetric, tongue/uvula midline.  Motor: Tone normal. LUE is 5/5, RUE is 5/5, LLE is 5/5, RLE is 5/5. No abnormal movements noted. Pronator drift absent   Sensory: Intact to light touch in all 4 extremities; No lateral extinction on sensory exam   Coordination: FNF intact bilaterally with no dysmetria  Gait: Deferred        CURRENT MEDICATIONS:    heparin ANTICOAGULANT  5,000 Units Subcutaneous Q8H       PRN MEDICATIONS:  acetaminophen **OR** acetaminophen, magnesium sulfate, magnesium sulfate, morphine, naloxone, potassium chloride, potassium chloride with lidocaine, potassium chloride, potassium chloride, potassium chloride, sodium phosphate, sodium phosphate, sodium phosphate, sodium phosphate    INFUSIONS:    lactated ringers 125 mL/hr at 07/19/20 1300       ALLERGIES:  Allergies   Allergen Reactions     Nka [No Known Allergies]          LABS/STUDIES:  Recent Labs   Lab Test 07/19/20  1029 07/19/20  0302 07/18/20  1739  07/16/20  1134    146* 141   < > 137   POTASSIUM 3.8 3.8 3.9   < > 2.9*   CHLORIDE 118* 119* 109   < > 105   CO2 22 21 24   < > 25   ANIONGAP 3 6 8   < > 7   GLC 86 87 102*   < > 112*   BUN 22 24 30   < > 11   CR 1.63* 2.19* 3.23*   < > 1.17   MARY 8.2* 8.0* 8.9   < > 9.3   WBC  --  12.9* 18.1*  --  8.8   RBC  --  3.33* 4.08*  --  4.56   HGB  --  10.2* 12.9*  --  14.4   PLT  --  198 274  --  328    < > = values in this interval not displayed.       No results for input(s): INR, PTT in the last 33304 hours.    Recent Labs   Lab 07/19/20  1207 07/19/20  0250 07/18/20  2203 07/18/20 2005   PH 7.28* 7.24*  --   --    PCO2 42 47*  --   --    PO2 64* 78*  --   --    HCO3 20* 20*  --   --    O2PER 2L 2L 88% RA, O2 reapplied at 2 LPM at time of draw 17%       IMAGING:  CTH 07/19/20:   IMPRESSION:  1.  No mass, hemorrhage or stroke.  2.  Mild presumed chronic small vessel ischemic change  and generalized  volume loss.  3.  No change.

## 2020-07-19 NOTE — H&P
Neuroscience Intensive Care H&P    Juan Lindsay is a 70 year old who was admitted for AMS.    Problem List:  AMS  Respiratory acidosis w secondary non-AG metabolic acidosis  Depression/anxiety  Chronic pain on opioids  ANGELA  Distant Hx EtOH abuse      HPI  Mr. Lindsay is a 69 yo M who presented to OSH ED on  w staring spells, confusion, and myoclonic jerks. He was initially seen in the ED on  for myoclonic jerks v akathesia which resolved with electrolyte repletion and ativan and he was sent home. Then on AM of the  he woke up confused, very tired, and with returned myoclonus. They thought maybe it was due to his sleep medications, but it kept getting worse. He wasn't even able to hold a cup by afternoon due to the shaking so they went back in, at which point the repeated labs and he was sent here.    There was a report from OSH that he may have stopped taking his lexapro suddenly but he denies this and his S.O. isn't sure because he takes care of his own meds. He denies making any changes. While he normally seems to do okay, she does note that he doesn't have a good organizational system for them.    Endorses joint pain (worse than usual valdez knees), poor PO intake over past few days (but also states he is hungry, denies anorexia or nausea), penile pain (after de jesus placement).      Past Medical/Surgery History:   Past Medical History:   Diagnosis Date     Allergic rhinitis due to other allergen      Arthritis      Depressive disorder, not elsewhere classified      Other and unspecified alcohol dependence, unspecified drinking behavior      Tobacco use disorder      Unspecified sleep apnea        Family History:   Family History   Problem Relation Age of Onset     Lipids Mother      Alzheimer Disease Mother          2010     Anemia Mother         Questionable     Hyperlipidemia Mother      Cerebrovascular Disease Mother      Anxiety Disorder Mother      Unknown/Adopted Mother      Lipids Father       Cerebrovascular Disease Father          - mouth cancer     Cancer Father      Neurologic Disorder Brother         atacksia     Substance Abuse Brother         Ataxia,      Cerebrovascular Disease Brother         Ataxia, alchol abuse     Alcohol/Drug Brother         Alcohal and Rx abuse. Suicide 2007     Psychotic Disorder Brother      Thyroid Disease Brother      Cerebrovascular Disease Brother         suicide     Anxiety Disorder Brother      Substance Abuse Brother         suicide     Thyroid Disease Brother        Social History:   Social History     Tobacco Use     Smoking status: Light Tobacco Smoker     Packs/day: 0.50     Years: 25.00     Pack years: 12.50     Types: Cigarettes     Start date: 1980     Last attempt to quit: 2014     Years since quittin.8     Smokeless tobacco: Never Used     Tobacco comment: can't get my mind to agree with my feelings   Substance Use Topics     Alcohol use: No       ROS: Review Of Systems neg except as per HPI      Vital Signs:    No data recorded    Resp  Min: 8  Max: 30    SpO2  Min: 89 %  Max: 100 %    Pulse  Min: 52  Max: 90    Heart Rate  Min: 51  Max: 73    Systolic (24hrs), Av , Min:78 , Max:131   Diastolic (24hrs), Av, Min:43, Max:81        Physical Examination:  General: NAD, laying in bed, dry flaking skin on BUE, large bruise with blister on L great toe (he dropped something on it)  HEENT: normocephalic/atraumatic  Cardiovascular: regular rhythm, bradycardic, no murmurs  Pulmonary: CTAB frontal fields  Neuro:  MS: Oriented to self and general situation, awake and alert but fluctuating attention, repetition and naming intact, can say # quarters in $1.75,   CN: VFF, PERRL, EOMI w saccades and oculomotor apraxia, face movements and sensation symmetric, mild dysarthria, tongue midline  Motor: 5/5 elbow flex/extension, R hip flex (L hip limited by pain), and bilat plantar/dorsiflex. Diffuse, spontaneous, non-rhythmic myoclonus in upper>lower  extremities. + asterixis.  Reflexes: no clonus bilat, patellar absent (bilat knee replacement), toes downgoing, brisk/symmetric brachioradialis w/o shoulder spread  Sensation: intact to light touch x4 limbs  Coordination: mild-mod ataxia x4 limbs  Gait: Not assessed due to mental status/fall risk    Labs/Studies:  Results for orders placed or performed during the hospital encounter of 07/19/20   Glucose by meter     Status: None   Result Value Ref Range    Glucose 93 70 - 99 mg/dL   Results for orders placed or performed during the hospital encounter of 07/18/20   Head CT w/o contrast     Status: None    Narrative    CT OF THE HEAD WITHOUT CONTRAST   7/18/2020 7:57 PM     COMPARISON: Head CT 7/12/2018    HISTORY:  Seizure activity     TECHNIQUE:  Axial CT images of the head from the skull base to the  vertex were acquired without IV contrast.    FINDINGS:   INTRACRANIAL CONTENTS: No intracranial hemorrhage, extraaxial  collection, or mass effect.  No CT evidence of acute infarct.   Mild  presumed chronic small vessel ischemic change with mild generalized  volume loss..    VISUALIZED ORBITS/SINUSES/MASTOIDS: No significant orbital  abnormality.  No significant paranasal sinus mucosal disease. No  significant middle ear or mastoid effusion.    OSSEOUS STRUCTURES/SOFT TISSUES: No significant abnormality.      Impression    IMPRESSION:  1.  No mass, hemorrhage or stroke.  2.  Mild presumed chronic small vessel ischemic change and generalized  volume loss.  3.  No change.    Radiation dose for this scan was reduced using automated exposure  control, adjustment of the mA and/or kV according to patient size, or  iterative reconstruction technique    EVANS NARAYAN MD   CBC with platelets, differential     Status: Abnormal   Result Value Ref Range    WBC 18.1 (H) 4.0 - 11.0 10e9/L    RBC Count 4.08 (L) 4.4 - 5.9 10e12/L    Hemoglobin 12.9 (L) 13.3 - 17.7 g/dL    Hematocrit 39.8 (L) 40.0 - 53.0 %    MCV 98 78 - 100 fl    MCH  31.6 26.5 - 33.0 pg    MCHC 32.4 31.5 - 36.5 g/dL    RDW 15.2 (H) 10.0 - 15.0 %    Platelet Count 274 150 - 450 10e9/L    Diff Method Automated Method     % Neutrophils 84.3 %    % Lymphocytes 9.7 %    % Monocytes 5.0 %    % Eosinophils 0.3 %    % Basophils 0.3 %    % Immature Granulocytes 0.4 %    Nucleated RBCs 0 0 /100    Absolute Neutrophil 15.2 (H) 1.6 - 8.3 10e9/L    Absolute Lymphocytes 1.8 0.8 - 5.3 10e9/L    Absolute Monocytes 0.9 0.0 - 1.3 10e9/L    Absolute Eosinophils 0.1 0.0 - 0.7 10e9/L    Absolute Basophils 0.1 0.0 - 0.2 10e9/L    Abs Immature Granulocytes 0.1 0 - 0.4 10e9/L    Absolute Nucleated RBC 0.0    Magnesium     Status: None   Result Value Ref Range    Magnesium 2.3 1.6 - 2.3 mg/dL   Comprehensive metabolic panel     Status: Abnormal   Result Value Ref Range    Sodium 141 133 - 144 mmol/L    Potassium 3.9 3.4 - 5.3 mmol/L    Chloride 109 94 - 109 mmol/L    Carbon Dioxide 24 20 - 32 mmol/L    Anion Gap 8 3 - 14 mmol/L    Glucose 102 (H) 70 - 99 mg/dL    Urea Nitrogen 30 7 - 30 mg/dL    Creatinine 3.23 (H) 0.66 - 1.25 mg/dL    GFR Estimate 19 (L) >60 mL/min/[1.73_m2]    GFR Estimate If Black 22 (L) >60 mL/min/[1.73_m2]    Calcium 8.9 8.5 - 10.1 mg/dL    Bilirubin Total 0.5 0.2 - 1.3 mg/dL    Albumin 3.9 3.4 - 5.0 g/dL    Protein Total 7.5 6.8 - 8.8 g/dL    Alkaline Phosphatase 71 40 - 150 U/L    ALT 18 0 - 70 U/L    AST 23 0 - 45 U/L   Alcohol ethyl     Status: None   Result Value Ref Range    Ethanol g/dL <0.01 <0.01 g/dL   Phosphorus     Status: Abnormal   Result Value Ref Range    Phosphorus 6.0 (H) 2.5 - 4.5 mg/dL   Erythrocyte sedimentation rate auto     Status: None   Result Value Ref Range    Sed Rate 8 0 - 20 mm/h   CRP Inflammation     Status: Abnormal   Result Value Ref Range    CRP Inflammation 74.8 (H) 0.0 - 8.0 mg/L   Lactic acid whole blood     Status: Abnormal   Result Value Ref Range    Lactic Acid 0.5 (L) 0.7 - 2.0 mmol/L   CK total     Status: Abnormal   Result Value Ref Range     CK Total 427 (H) 30 - 300 U/L   Blood gas venous     Status: Abnormal   Result Value Ref Range    Ph Venous 7.22 (L) 7.32 - 7.43 pH    PCO2 Venous 58 (H) 40 - 50 mm Hg    PO2 Venous 37 25 - 47 mm Hg    Bicarbonate Venous 24 21 - 28 mmol/L    Base Deficit Venous 4.5 mmol/L    FIO2 HIDE    Blood gas venous     Status: Abnormal   Result Value Ref Range    Ph Venous 7.16 (LL) 7.32 - 7.43 pH    PCO2 Venous 63 (H) 40 - 50 mm Hg    PO2 Venous 26 25 - 47 mm Hg    Bicarbonate Venous 22 21 - 28 mmol/L    Base Deficit Venous 6.9 mmol/L    FIO2 17%    UA with Microscopic     Status: Abnormal   Result Value Ref Range    Color Urine Yellow     Appearance Urine Slightly Cloudy     Glucose Urine Negative NEG^Negative mg/dL    Bilirubin Urine Negative NEG^Negative    Ketones Urine Negative NEG^Negative mg/dL    Specific Gravity Urine 1.024 1.003 - 1.035    Blood Urine Moderate (A) NEG^Negative    pH Urine 5.0 5.0 - 7.0 pH    Protein Albumin Urine 30 (A) NEG^Negative mg/dL    Urobilinogen mg/dL 0.0 0.0 - 2.0 mg/dL    Nitrite Urine Negative NEG^Negative    Leukocyte Esterase Urine Negative NEG^Negative    Source Midstream Urine     WBC Urine 1 0 - 5 /HPF    RBC Urine 20 (H) 0 - 2 /HPF    Squamous Epithelial /HPF Urine <1 0 - 1 /HPF    Mucous Urine Present (A) NEG^Negative /LPF    Hyaline Casts 25 (H) 0 - 2 /LPF   Ammonia (on ice)     Status: None   Result Value Ref Range    Ammonia 28 10 - 50 umol/L   Blood gas venous     Status: Abnormal   Result Value Ref Range    Ph Venous 7.21 (L) 7.32 - 7.43 pH    PCO2 Venous 58 (H) 40 - 50 mm Hg    PO2 Venous 27 25 - 47 mm Hg    Bicarbonate Venous 23 21 - 28 mmol/L    Base Deficit Venous 5.5 mmol/L    FIO2 88% RA, O2 reapplied at 2 LPM at time of draw    Drug abuse screen 77 urine (FL, RH, SH)     Status: Abnormal   Result Value Ref Range    Amphetamine Qual Urine Negative NEG^Negative    Barbiturates Qual Urine Negative NEG^Negative    Benzodiazepine Qual Urine Positive (A) NEG^Negative     Cannabinoids Qual Urine Negative NEG^Negative    Cocaine Qual Urine Negative NEG^Negative    Opiates Qualitative Urine Positive (A) NEG^Negative    PCP Qual Urine Negative NEG^Negative   Troponin I     Status: None   Result Value Ref Range    Troponin I ES 0.027 0.000 - 0.045 ug/L   Salicylate level     Status: None   Result Value Ref Range    Salicylate Level 4 mg/dL   Acetaminophen level     Status: None   Result Value Ref Range    Acetaminophen Level <2 mg/L   Blood culture     Status: None (Preliminary result)    Specimen: Blood    Left Arm   Result Value Ref Range    Specimen Description Blood Left Arm     Culture Micro PENDING    Blood culture     Status: None (Preliminary result)    Specimen: Blood    Left Arm   Result Value Ref Range    Specimen Description Blood Left Arm     Culture Micro PENDING    Glucose CSF:     Status: Abnormal   Result Value Ref Range    Glucose CSF 76 (H) 40 - 70 mg/dL   Protein total CSF:     Status: None   Result Value Ref Range    Protein Total CSF 39 15 - 60 mg/dL   Gram stain     Status: None    Specimen: Cerebrospinal fluid    TUBE 3   Result Value Ref Range    Specimen Description Cerebrospinal fluid TUBE 3     Gram Stain No organisms seen     Gram Stain No WBC's seen     Gram Stain       Quantification of host cells and microbiological organisms was done on a cytocentrifuged   preparation.     CSF Culture Aerobic Bacterial     Status: None (Preliminary result)    Specimen: Cerebrospinal fluid    TUBE 3   Result Value Ref Range    Specimen Description Cerebrospinal fluid TUBE 3     Culture Micro PENDING    Cell count with differential CSF:     Status: None   Result Value Ref Range    WBC CSF 0 0 - 5 /uL    RBC CSF 0 0 - 2 /uL    Tube Number 4 #    Volume 2 mL    Color CSF Colorless CLRL^Colorless    Appearance CSF Clear CLER^Clear             Assessment and Plan:  Juan Lindsay is a 70 year old w anxiety/depression and chronic pain who is being transferred for evaluation of  acute AMS, myoclonus and diarrhea.    The myoclonus is most likely 2/2 underlying metabolic abnormalities, although the cause of those abnormalities remains unclear. Ddx remains broad, suspicion currently highest for medication interaction/effect, although his CRP and joint pain raise some concern for a systemic disease. CNS inflammatory/infectious condition r/o by normal CSF results. Much less likely possibilities include rare conditions like Whipple's disease.        Neuro:  #Diffuse myoclonus  #Encephalopathy  - LP done at OSH, neg RBC/WBC, normal protein and okay glucose  - s/p keppra and versed at OSH   -Will not repeat now as they did not clearly help his clinical presentation and there is low concern for status epilepticus given level of alertness  - Full tox screen pending       #Depression  #Anxiety  -holding PTA lexapro      #Chronic pain  -PTA morphine 30mg IR q4h PRN (apparently takes 12hr QID at home, changed per pharm request)  -Tylenol PRN  -Pharmacy consult to confirm unusual dosing of controlled substances      Cardiovascular:  #Sinus bradycardia, chronic, currently asymptomatic  Hx frequent PACs, PVCs  -Continuous cardiac monitoring       Respiratory:  #ANGELA  -BiPAP during sleep    Gastrointestinal:  #Reported poor PO intake  -Will monitor, consider nutrition consult    Diet: NPO pending swallow study    Renal/:  #CLAIRE  #Rhabdomyolysis  IVF: LR 125mL/h  -S/p 1500mL bolus at OSH  -Repeat CK    #Hyperphosphatemia  -Suspect this is related to non-traumatic rhabdomyolysis and acidemia. Monitor    #Penile trauma  2/2 Turner. Will monitor and consider removal in AM. Unclear why it was placed initially, patient denies difficulty urinating but is a poor historian.    Endocrine:  #No active issues    Hematological  #Leukocytosis  -See ID section    ID  Afebrile, +leukocytosis  -Monitoring CBC, likely stress demargination w lack of infectious symptoms and signs  -s/p vanc at OSH  - Pan-culture if spike fever      #L foot injury  -Wound care nurse consult      DVT ppx: heparin subcutaneous and SCDs  Lines: Johnny RENEE  Code: Full    Anay Martinez M.D.  PGY3 Neurology

## 2020-07-19 NOTE — PLAN OF CARE
Major Shift Events:  Neuros WNL, forgetful. NHR as low as 36.BP labile. Some c/o dizziness C/o b/L lower back pain, morphine & tylenol PO. On 2LPM. BS+, BM-. Turner pulled at 0900. UO picked up in afternoon. See I&O. 1L NS bolus. Able to go to MRI when GFR past 45. Awaiting results. EEG. Flonase ordered prn. Regular diet with fair appetite.     2 rings and watch in black safe on 4A prior to MRI.      Plan: Continue to monitor. Contact Gen Neurology w/changes. Bed on 6B when available.   For vital signs and complete assessments, please see documentation flowsheets.

## 2020-07-19 NOTE — ED NOTES
[facility] here, report given. Gtts verified and transferred to pump. Norepi at 0.06mcg/kg/min. Pt more aware, talking. Bipap d/c'd.

## 2020-07-19 NOTE — PLAN OF CARE
Neuro: Oriented to self and year. Very inattentive to questioning. PERRL. Full strength throughout.     Cardiac: Bradycardic- lowest 45 BPM. Normotensive. Afebrile.    Resp: Clear lung sounds. On BiPap.     GI/: Passed bedside swallow. Turner with adequate UOP.    Plan: MRI. EEG.

## 2020-07-19 NOTE — ED NOTES
Pt to CT UNMONITORED via [facility] ED charge RN. Will obtain repeat vitals and continue monitoring when pt returns.

## 2020-07-19 NOTE — PROGRESS NOTES
[facility]  Medicine triage note     70M   P/w hypokalemia to ED in [facility]  Found to have myotonic jerking, resolved and discharged home from ED   Returned to ED with episodes of blank stares and myotonic jerking, then became markedly somnolent, requiring oxygen   s/p LP  VBG 7.16/58/60   WBC 18  CRP 75  Cr 3 from 1    Neurology at [facility] aware  Critically ill patient requiring ICU assessment. [facility] is calling neuro ICU at [facility].     Rodrigue Valles MD

## 2020-07-19 NOTE — UTILIZATION REVIEW
Admission Status; Secondary Review Determination       Under the authority of the Utilization Management Committee, the utilization review process indicated a secondary review on the above patient. The review outcome is based on review of the medical records, discussions with staff, and applying clinical experience noted on the date of the review.     (x) Inpatient Status Appropriate - This patient's medical care is consistent with medical management for inpatient care and reasonable inpatient medical practice.     RATIONALE FOR DETERMINATION   69 yo man with with chronic pain syndrome on high-dose chronic opiates who was transferred from [facility] to the [facility] due to higher level of care and specialty care needed in the neurologic intensive care unit.  He presented to the ED on 7/18/2020 with myoclonic jerks, confusion and abnormal electrolytes.  Has needed intermittent BiPAP and supplemental oxygen.  Baseline creatinine 1.12 and was 3.23 on presentation to outside hospital.  Responded to Narcan so possible toxic encephalopathy due to opiates is being considered. He remains in the ICU. Not discharging today.     At the time of admission with the information available to the attending physician more than 2 nights hospital complex care was anticipated, based on patient risk of adverse outcome if treated as outpatient and complex care required. Inpatient admission is appropriate based on the Medicare guidelines.     This document was produced using voice recognition software.    The information on this document is developed by the utilization review team in order for the business office to ensure compliance. This only denotes the appropriateness of proper admission status and does not reflect the quality of care rendered.   The definitions of Inpatient Status and Observation Status used in making the determination above are those provided in the CMS Coverage Manual, Chapter 1 and Chapter 6,  section 70.4.     Sincerely,   Jackie Giordano MD  Utilization Review  Physician Advisor  [facility].

## 2020-07-19 NOTE — PLAN OF CARE
4A PT hold: In AM RN requesting PT to hold as patient not yet appropriate for therapy while being worked up by team. In PM patient off floor for imaging.     Also, patient without activity orders. Please update prior to PT evaluation.

## 2020-07-19 NOTE — ANESTHESIA PROCEDURE NOTES
Procedure note : lumbar puncture  Staff -       CRNA: Dillan Nathan APRN CRNA    Performed By: CRNA        Pre-Procedure    Location: ED    Procedure Times:7/18/2020 8:36 PM and 7/18/2020 8:47 PM  Pre-Anesthestic Checklist: patient identified, risks and benefits discussed, informed consent, monitors and equipment checked, pre-op evaluation and at physician/surgeon's request    Timeout  Correct Patient: Yes   Correct Procedure: Yes   Correct Site: Yes   Correct Laterality: N/A   Correct Position: Yes   Site Marked: N/A   .   Procedure Documentation  ASA 3 and Emergent  Diagnosis:Confusion, myoclonic jerks, leukocytosis.    Procedure: lumbar puncture, .   Patient Position:sitting Insertion Site:L3-4  (midline approach)     Patient Prep/Sterile Barriers; povidone-iodine 7.5% surgical scrub.  .  Needle:  Spinal Needle (gauge): 22  Spinal/LP Needle Length (inches): 3.5 # of attempts: 1 and # of redirects:  No introducer used .        Assessment/Narrative  Paresthesias: No.  .  .  10 mL of clear CSF fluid removed . Time Injected: 20:39while lateral

## 2020-07-20 ENCOUNTER — APPOINTMENT (OUTPATIENT)
Dept: OCCUPATIONAL THERAPY | Facility: CLINIC | Age: 70
DRG: 682 | End: 2020-07-20
Attending: PSYCHIATRY & NEUROLOGY
Payer: MEDICARE

## 2020-07-20 ENCOUNTER — APPOINTMENT (OUTPATIENT)
Dept: CARDIOLOGY | Facility: CLINIC | Age: 70
DRG: 682 | End: 2020-07-20
Attending: NURSE PRACTITIONER
Payer: MEDICARE

## 2020-07-20 ENCOUNTER — APPOINTMENT (OUTPATIENT)
Dept: NEUROLOGY | Facility: CLINIC | Age: 70
DRG: 682 | End: 2020-07-20
Attending: STUDENT IN AN ORGANIZED HEALTH CARE EDUCATION/TRAINING PROGRAM
Payer: MEDICARE

## 2020-07-20 LAB
ABO + RH BLD: NORMAL
ABO + RH BLD: NORMAL
ALBUMIN SERPL-MCNC: 2.9 G/DL (ref 3.4–5)
ALP SERPL-CCNC: 60 U/L (ref 40–150)
ALT SERPL W P-5'-P-CCNC: 17 U/L (ref 0–70)
ANION GAP SERPL CALCULATED.3IONS-SCNC: 5 MMOL/L (ref 3–14)
AST SERPL W P-5'-P-CCNC: 17 U/L (ref 0–45)
BILIRUB SERPL-MCNC: 0.7 MG/DL (ref 0.2–1.3)
BLD GP AB SCN SERPL QL: NORMAL
BLOOD BANK CMNT PATIENT-IMP: NORMAL
BUN SERPL-MCNC: 13 MG/DL (ref 7–30)
CALCIUM SERPL-MCNC: 8.6 MG/DL (ref 8.5–10.1)
CHLORIDE SERPL-SCNC: 114 MMOL/L (ref 94–109)
CO2 SERPL-SCNC: 22 MMOL/L (ref 20–32)
CREAT SERPL-MCNC: 1.06 MG/DL (ref 0.66–1.25)
ERYTHROCYTE [DISTWIDTH] IN BLOOD BY AUTOMATED COUNT: 15.2 % (ref 10–15)
GFR SERPL CREATININE-BSD FRML MDRD: 72 ML/MIN/{1.73_M2}
GLUCOSE BLDC GLUCOMTR-MCNC: 93 MG/DL (ref 70–99)
GLUCOSE BLDC GLUCOMTR-MCNC: 94 MG/DL (ref 70–99)
GLUCOSE SERPL-MCNC: 93 MG/DL (ref 70–99)
HCT VFR BLD AUTO: 33.5 % (ref 40–53)
HGB BLD-MCNC: 10.6 G/DL (ref 13.3–17.7)
INTERPRETATION ECG - MUSE: NORMAL
MCH RBC QN AUTO: 31.5 PG (ref 26.5–33)
MCHC RBC AUTO-ENTMCNC: 31.6 G/DL (ref 31.5–36.5)
MCV RBC AUTO: 99 FL (ref 78–100)
PLATELET # BLD AUTO: 200 10E9/L (ref 150–450)
POTASSIUM SERPL-SCNC: 3.7 MMOL/L (ref 3.4–5.3)
PROT SERPL-MCNC: 6 G/DL (ref 6.8–8.8)
RBC # BLD AUTO: 3.37 10E12/L (ref 4.4–5.9)
SARS-COV-2 RNA SPEC QL NAA+PROBE: NOT DETECTED
SODIUM SERPL-SCNC: 142 MMOL/L (ref 133–144)
SPECIMEN EXP DATE BLD: NORMAL
SPECIMEN SOURCE: NORMAL
WBC # BLD AUTO: 9 10E9/L (ref 4–11)
WNV IGG CSF-ACNC: 0.18 IV
WNV IGM CSF-ACNC: 0.03 IV

## 2020-07-20 PROCEDURE — 99222 1ST HOSP IP/OBS MODERATE 55: CPT | Mod: 25 | Performed by: INTERNAL MEDICINE

## 2020-07-20 PROCEDURE — 20000004 ZZH R&B ICU UMMC

## 2020-07-20 PROCEDURE — 25000128 H RX IP 250 OP 636: Performed by: STUDENT IN AN ORGANIZED HEALTH CARE EDUCATION/TRAINING PROGRAM

## 2020-07-20 PROCEDURE — 93306 TTE W/DOPPLER COMPLETE: CPT

## 2020-07-20 PROCEDURE — 36415 COLL VENOUS BLD VENIPUNCTURE: CPT | Performed by: STUDENT IN AN ORGANIZED HEALTH CARE EDUCATION/TRAINING PROGRAM

## 2020-07-20 PROCEDURE — 97535 SELF CARE MNGMENT TRAINING: CPT | Mod: GO | Performed by: OCCUPATIONAL THERAPIST

## 2020-07-20 PROCEDURE — 80053 COMPREHEN METABOLIC PANEL: CPT | Performed by: STUDENT IN AN ORGANIZED HEALTH CARE EDUCATION/TRAINING PROGRAM

## 2020-07-20 PROCEDURE — 85027 COMPLETE CBC AUTOMATED: CPT | Performed by: STUDENT IN AN ORGANIZED HEALTH CARE EDUCATION/TRAINING PROGRAM

## 2020-07-20 PROCEDURE — 93306 TTE W/DOPPLER COMPLETE: CPT | Mod: 26 | Performed by: INTERNAL MEDICINE

## 2020-07-20 PROCEDURE — G0463 HOSPITAL OUTPT CLINIC VISIT: HCPCS

## 2020-07-20 PROCEDURE — 25000132 ZZH RX MED GY IP 250 OP 250 PS 637: Mod: GY | Performed by: STUDENT IN AN ORGANIZED HEALTH CARE EDUCATION/TRAINING PROGRAM

## 2020-07-20 PROCEDURE — 95711 VEEG 2-12 HR UNMONITORED: CPT

## 2020-07-20 PROCEDURE — 25000132 ZZH RX MED GY IP 250 OP 250 PS 637: Mod: GY | Performed by: PSYCHIATRY & NEUROLOGY

## 2020-07-20 PROCEDURE — 97530 THERAPEUTIC ACTIVITIES: CPT | Mod: GO | Performed by: OCCUPATIONAL THERAPIST

## 2020-07-20 PROCEDURE — 97165 OT EVAL LOW COMPLEX 30 MIN: CPT | Mod: GO | Performed by: OCCUPATIONAL THERAPIST

## 2020-07-20 PROCEDURE — 00000146 ZZHCL STATISTIC GLUCOSE BY METER IP

## 2020-07-20 PROCEDURE — 25000125 ZZHC RX 250: Performed by: STUDENT IN AN ORGANIZED HEALTH CARE EDUCATION/TRAINING PROGRAM

## 2020-07-20 RX ORDER — LISINOPRIL 2.5 MG/1
2.5 TABLET ORAL DAILY
Status: DISCONTINUED | OUTPATIENT
Start: 2020-07-20 | End: 2020-07-21

## 2020-07-20 RX ORDER — MORPHINE SULFATE 15 MG/1
15 TABLET, FILM COATED, EXTENDED RELEASE ORAL 3 TIMES DAILY
Status: DISCONTINUED | OUTPATIENT
Start: 2020-07-20 | End: 2020-07-20

## 2020-07-20 RX ORDER — HYDROCODONE BITARTRATE AND ACETAMINOPHEN 10; 325 MG/1; MG/1
1 TABLET ORAL ONCE
Status: COMPLETED | OUTPATIENT
Start: 2020-07-20 | End: 2020-07-20

## 2020-07-20 RX ORDER — LORAZEPAM 2 MG/ML
1 INJECTION INTRAMUSCULAR
Status: COMPLETED | OUTPATIENT
Start: 2020-07-20 | End: 2020-07-20

## 2020-07-20 RX ORDER — MORPHINE SULFATE 15 MG/1
15 TABLET, FILM COATED, EXTENDED RELEASE ORAL AT BEDTIME
Status: DISCONTINUED | OUTPATIENT
Start: 2020-07-20 | End: 2020-07-21

## 2020-07-20 RX ORDER — HYDROXYZINE HYDROCHLORIDE 25 MG/1
25-50 TABLET, FILM COATED ORAL EVERY 4 HOURS PRN
Status: DISCONTINUED | OUTPATIENT
Start: 2020-07-20 | End: 2020-07-22 | Stop reason: HOSPADM

## 2020-07-20 RX ORDER — HYDRALAZINE HYDROCHLORIDE 20 MG/ML
10 INJECTION INTRAMUSCULAR; INTRAVENOUS EVERY 6 HOURS PRN
Status: DISCONTINUED | OUTPATIENT
Start: 2020-07-20 | End: 2020-07-21

## 2020-07-20 RX ORDER — MORPHINE SULFATE 30 MG/1
30 TABLET, FILM COATED, EXTENDED RELEASE ORAL DAILY
Status: DISCONTINUED | OUTPATIENT
Start: 2020-07-21 | End: 2020-07-21

## 2020-07-20 RX ORDER — MORPHINE SULFATE 15 MG/1
15 TABLET, FILM COATED, EXTENDED RELEASE ORAL ONCE
Status: COMPLETED | OUTPATIENT
Start: 2020-07-20 | End: 2020-07-20

## 2020-07-20 RX ADMIN — MORPHINE SULFATE 15 MG: 15 TABLET, EXTENDED RELEASE ORAL at 22:04

## 2020-07-20 RX ADMIN — HYDRALAZINE HYDROCHLORIDE 10 MG: 20 INJECTION INTRAMUSCULAR; INTRAVENOUS at 22:45

## 2020-07-20 RX ADMIN — MORPHINE SULFATE 30 MG: 15 TABLET ORAL at 10:23

## 2020-07-20 RX ADMIN — HYDROXYZINE HYDROCHLORIDE 25 MG: 25 TABLET ORAL at 19:48

## 2020-07-20 RX ADMIN — HYDROCODONE BITARTRATE AND ACETAMINOPHEN 1 TABLET: 10; 325 TABLET ORAL at 15:19

## 2020-07-20 RX ADMIN — MORPHINE SULFATE 30 MG: 15 TABLET ORAL at 00:15

## 2020-07-20 RX ADMIN — HYDRALAZINE HYDROCHLORIDE 10 MG: 20 INJECTION INTRAMUSCULAR; INTRAVENOUS at 15:21

## 2020-07-20 RX ADMIN — MORPHINE SULFATE 15 MG: 15 TABLET, EXTENDED RELEASE ORAL at 14:18

## 2020-07-20 RX ADMIN — HYDROXYZINE HYDROCHLORIDE 25 MG: 25 TABLET ORAL at 22:43

## 2020-07-20 RX ADMIN — POTASSIUM CHLORIDE 20 MEQ: 750 TABLET, EXTENDED RELEASE ORAL at 10:23

## 2020-07-20 RX ADMIN — LORAZEPAM 1 MG: 2 INJECTION INTRAMUSCULAR; INTRAVENOUS at 06:32

## 2020-07-20 RX ADMIN — HEPARIN SODIUM 5000 UNITS: 5000 INJECTION, SOLUTION INTRAVENOUS; SUBCUTANEOUS at 14:18

## 2020-07-20 RX ADMIN — MORPHINE SULFATE 15 MG: 15 TABLET, EXTENDED RELEASE ORAL at 16:04

## 2020-07-20 RX ADMIN — HEPARIN SODIUM 5000 UNITS: 5000 INJECTION, SOLUTION INTRAVENOUS; SUBCUTANEOUS at 22:03

## 2020-07-20 RX ADMIN — MORPHINE SULFATE 30 MG: 15 TABLET ORAL at 04:07

## 2020-07-20 RX ADMIN — ACETAMINOPHEN 650 MG: 325 TABLET, FILM COATED ORAL at 04:07

## 2020-07-20 RX ADMIN — LISINOPRIL 2.5 MG: 2.5 TABLET ORAL at 12:21

## 2020-07-20 RX ADMIN — FLUTICASONE PROPIONATE 1 SPRAY: 50 SPRAY, METERED NASAL at 07:40

## 2020-07-20 RX ADMIN — HEPARIN SODIUM 5000 UNITS: 5000 INJECTION, SOLUTION INTRAVENOUS; SUBCUTANEOUS at 05:41

## 2020-07-20 RX ADMIN — NICOTINE 1 PATCH: 21 PATCH, EXTENDED RELEASE TRANSDERMAL at 07:41

## 2020-07-20 ASSESSMENT — ACTIVITIES OF DAILY LIVING (ADL)
ADLS_ACUITY_SCORE: 11
ADLS_ACUITY_SCORE: 12
ADLS_ACUITY_SCORE: 12
ADLS_ACUITY_SCORE: 11
PREVIOUS_RESPONSIBILITIES: MEAL PREP;HOUSEKEEPING;LAUNDRY;MEDICATION MANAGEMENT;DRIVING
ADLS_ACUITY_SCORE: 12
ADLS_ACUITY_SCORE: 11

## 2020-07-20 ASSESSMENT — PAIN DESCRIPTION - DESCRIPTORS
DESCRIPTORS: ACHING;HEADACHE
DESCRIPTORS: ACHING

## 2020-07-20 ASSESSMENT — MIFFLIN-ST. JEOR: SCORE: 1478.25

## 2020-07-20 NOTE — PROGRESS NOTES
07/20/20 1000   Quick Adds   Type of Visit Initial Occupational Therapy Evaluation   Living Environment   Lives With spouse   Living Arrangements house   Home Accessibility stairs to enter home;stairs within home   Number of Stairs, Main Entrance 3   Number of Stairs, Within Home, Primary   (13)   Stair Railings, Within Home, Primary   (yes)   Transportation Anticipated car, drives self;family or friend will provide   Living Environment Comment Pt is somewhat unclear with his responses and requires direct, repeated questioning; however, pt states he lives with his wife in a 2 story home with bedroom/bathroom in upper level, tub shower with chair, no grab bars, Pt states that he drives but that he mostly stays at home and his wife does most of the driving   Self-Care   Usual Activity Tolerance good   Current Activity Tolerance moderate   Regular Exercise No   Equipment Currently Used at Home   (has walker and shower chair but does not use)   Activity/Exercise/Self-Care Comment again, pt is unclear  but states he is independent with mobility and self cares at baseline; states he is retired, manages his own medications   Functional Level   Ambulation 0-->independent   Transferring 0-->independent   Toileting 0-->independent   Bathing 0-->independent   Dressing 0-->independent   Eating 0-->independent   Cognition 0 - no cognition issues reported   Fall history within last six months yes   Number of times patient has fallen within last six months 2   Which of the above functional risks had a recent onset or change? ambulation;transferring;bathing;cognition   Prior Functional Level Comment Pt denies any falls; however, medical chart indicates 2 falls- unknown baseline cognition       Present no   Language english   General Information   Onset of Illness/Injury or Date of Surgery - Date 07/19/20   Referring Physician Anay Martinez MD   Patient/Family Goals Statement return to home  "  Additional Occupational Profile Info/Pertinent History of Current Problem Juan Lindsay is a 70 year old with a history of alcohol abuse and chronic high dose opiate use who presented to OSH 7/18 w/ staring spells, confusion, myoclonic jerks, and somnolence that improved with administration of Narcan   Precautions/Limitations fall precautions;seizure precautions   General Observations Pt is generally agreeable; EEG, 2L O2 via nc, HR hannah (38-50), BP elevated 180s/80s - RN and MD aware and ok'd activity   General Info Comments Activity: verbal order per Neurology - Up with assist - requested written order   Cognitive Status Examination   Orientation person  (month and year)   Level of Consciousness alert   Follows Commands (Cognition) 75-90% accuracy;delayed response/completion;repetition of directions required;verbal cues/prompting required   Memory impaired   Attention Distractible during evaluation   Organization/Problem Solving Problem solving impaired   Executive Function Impulsive;Cognitive flexibility impaired;Self awareness/monitoring impaired   Cognitive Comment Pt is alert, conversational, distractible, requires frequent redirection of repetition of questioning to provide proper response, impaired memory, difficulty providing detailed responses to questions, oriented to month and year but not date or day of week, knows he is in the hospital but states \"[facility]\"    Visual Perception   Visual Perception Comments Pt denies acute visual changes   Sensory Examination   Sensory Quick Adds No deficits were identified   Pain Assessment   Patient Currently in Pain   (yes; chronic pain)   Range of Motion (ROM)   ROM Quick Adds No deficits were identified   Strength   Strength Comments strength grossly 5/5 in all extremities; movements slightly delayed on L at times   Muscle Tone Assessment   Muscle Tone Comments admitted with myoclonic jerks; not observed during session   Transfer Skill: Bed to Chair/Chair " to Bed   Level of Kimball: Bed to Chair contact guard   Transfer Skill: Sit to Stand   Level of Kimball: Sit/Stand contact guard   Balance   Balance Comments no LOB with ambulation throuhout the room with no AD   Lower Body Dressing   Level of Kimball: Dress Lower Body minimum assist (75% patients effort)   Grooming   Level of Kimball: Grooming stand-by assist   Physical Assist/Nonphysical Assist: Grooming verbal cues   Instrumental Activities of Daily Living (IADL)   Previous Responsibilities meal prep;housekeeping;laundry;medication management;driving   IADL Comments Pt wife able to assist with IADLs prn   Activities of Daily Living Analysis   Impairments Contributing to Impaired Activities of Daily Living cognition impaired;pain;strength decreased   General Therapy Interventions   Planned Therapy Interventions ADL retraining;IADL retraining;cognition;ROM;strengthening;transfer training   Clinical Impression   Criteria for Skilled Therapeutic Interventions Met yes, treatment indicated   OT Diagnosis decreased activity tolerance and independence with ADL/IADLs   Influenced by the following impairments impaired cognition, decreased activity tolerance   Assessment of Occupational Performance 5 or more Performance Deficits   Identified Performance Deficits bed mobility, transfers, toileting, dressing, bathing, mobility, home management, pain   Clinical Decision Making (Complexity) Low complexity   Therapy Frequency 5x/week   Predicted Duration of Therapy Intervention (days/wks) 7/26/20   Anticipated Discharge Disposition Home with Assist;Home with Home Therapy   Risks and Benefits of Treatment have been explained. Yes   Patient, Family & other staff in agreement with plan of care Yes   Clinical Impression Comments anticipate with continued cognitive clearing, pt could return to home with assist of his wife, gary would recommend 24hr assist due to impaired safety awareness and cognitive  processing - pt may benefit from home therapy pending progress to address safety in the home and proper medication management strategies.   Total Evaluation Time   Total Evaluation Time (Minutes) 5

## 2020-07-20 NOTE — CONSULTS
WO Nurse Inpatient Wound Assessment   Reason for consultation: Left great toe wound     Assessment  Left great toe wound due to Trauma  Status: initial assessment    Treatment Plan  Left great toe wound: Daily  Cleanse with normal saline, pat dry. Apply small primapore   Orders Written  Recommended provider order: None  WOC Nurse follow-up plan:weekly  Nursing to notify the Provider(s) and re-consult the WOC Nurse if wound(s) deteriorates or new skin concern.    Patient History  According to provider note(s):  Mr. Lindsay is a 71 yo M who presented to [facility] on 7/18 w staring spells, confusion, and myoclonic jerks. He was initially seen in the ED on 7/16 for myoclonic jerks v akathesia which resolved with electrolyte repletion and ativan and he was sent home. Then on AM of the 18th he woke up confused, very tired, and with returned myoclonus. They thought maybe it was due to his sleep medications, but it kept getting worse. He wasn't even able to hold a cup by afternoon due to the shaking so they went back in, at which point the repeated labs and he was sent here.     There was a report from OSH that he may have stopped taking his lexapro suddenly but he denies this and his S.O. isn't sure because he takes care of his own meds. He denies making any changes. While he normally seems to do okay, she does note that he doesn't have a good organizational system for them.     Endorses joint pain (worse than usual valdez knees), poor PO intake over past few days (but also states he is hungry, denies anorexia or nausea), penile pain (after de jesus placement).    Objective Data      Active Diet Order  Orders Placed This Encounter      Regular Diet Adult Thin Liquids (water, ice chips, juice, milk, gelatin, ice cream, etc)      Output:   I/O last 3 completed shifts:  In: 4190 [P.O.:1180; I.V.:3010]  Out: 2125 [Urine:2125]    Risk Assessment:   Sensory Perception: 4-->no impairment  Moisture: 4-->rarely moist  Activity: 3-->walks  occasionally  Mobility: 3-->slightly limited  Nutrition: 3-->adequate  Friction and Shear: 3-->no apparent problem  Masoud Score: 20                          Labs:   Recent Labs   Lab 07/20/20  0821  07/18/20  1740   ALBUMIN 2.9*  --   --    HGB 10.6*   < >  --    WBC 9.0   < >  --    CRP  --   --  74.8*    < > = values in this interval not displayed.       Physical Exam  Skin inspection: focused Bilateral feet    Wound Location:  Left great toe        Date of last photo 7/20/20  Wound History: Per patient thinks something dropped on it at some point, unsure when  Measurements (length x width x depth, in cm) 2  x 2.5  x  0.01 cm   Wound Base: 30 % dermis 70% ecchymotic  Tunneling N/A  Undermining N/A  Palpation of the wound bed: normal   Periwound skin: intact  Periwound Color: normal and consistent with surrounding tissue  Periwound Temperature: normal   Drainage:, small  Description of drainage: serosanguinous  Odor: none  Pain: denies     Interventions  Current support surface: Standard  Atmos Air mattress  Current off-loading measures: Pillows  Visual inspection and assessment completed   Wound Care: done per plan of care  Supplies: floor stock  Education provided: plan of care  Discussed plan of care with Patient and Nurse    Venita Tovar RN, CWOCN

## 2020-07-20 NOTE — PLAN OF CARE
Major Shift Events: Neuros WNL. Forgetful and occasionally disoriented to situation. C/o back and shoulder pain- only moderately relieved by PRN morphine and tylenol. 2L NC. Nicotine patch applied per patient request. Continuous EEG in place. HR 40s-50s.    Plan: Continue to monitor. Transfer to  this morning.    For vital signs and complete assessments, please see documentation flowsheets.

## 2020-07-20 NOTE — PROGRESS NOTES
"[facility]  General Neurology Progress Note (can also serve as transfer note)    Patient Name:  Juan Lindsay  MRN:  [id number]    :  1950  Date of Service: 2020      Patient Summary:  Per HPI:  \"Mr. Lindsay is a 71 yo M who presented to [facility] ED on  w staring spells, confusion, and myoclonic jerks. He was initially seen in the ED on  for myoclonic jerks v akathesia which resolved with electrolyte repletion and ativan and he was sent home. Then on AM of the  he woke up confused, very tired, and with returned myoclonus. They thought maybe it was due to his sleep medications, but it kept getting worse. He wasn't even able to hold a cup by afternoon due to the shaking so they went back in, at which point the repeated labs and he was sent here.     There was a report from OSH that he may have stopped taking his lexapro suddenly but he denies this and his S.O. isn't sure because he takes care of his own meds. He denies making any changes. While he normally seems to do okay, she does note that he doesn't have a good organizational system for them.\"      pt was transferred from stroke/critical care neurology service to general neurology service. On exam today Mr. Lindsay has limited recall and insight into his medical conditions.         Interval history:   This morning Mr. Lindsay is sitting up and looking improved compared to previous documented exams.      Physical Examination   Vitals: BP (!) 163/93   Pulse 54   Temp 98.4  F (36.9  C) (Axillary)   Resp 16   Ht 1.778 m (5' 10\")   Wt 70.2 kg (154 lb 12.2 oz)   SpO2 96%   BMI 22.21 kg/m    Physical Exam:  Constitutional: Alert. Sitting in chair comfortably. No acute distress.   Head: Atraumatic, normocephalic.  ENT: Moist mucous membranes. No sinus drainage.  Cardiovascular: Appears warm, well-perfused, and non-toxic. HR down to 30s while examining, no endorsed symptoms at the time or alteration in " consciousness.  Respiratory: No increased work of breathing, no accessory muscle use. Wheezes in bilateral lower lung fields  Gastrointestinal: Soft, nontender,nondistended  Musculoskeletal: Moving all four limbs spontaneously.   Skin: No rashes appreciated on visualized skin.   Hematologic/Lymphatic/Immunologic: Bruising on bilateral forearms at IV sites.   Neurologic:   Mental Status: alert, oriented to self, hospital. Confused about some details of admission/course. Unable to tell me what medications he takes daily. Mini Cog - he recalled 3/3 words correctly, but scored 0/2 on the clock (numbers clockwise but some more than once, and hands placed incorrectly).   Language: Speech is fluent, able to comprehend, and follows commands. Some paraphasic errors and word substitution. No dysarthria.  Cranial Nerves: pupils miotic, equal and reactive to light and accommodation, EOMI without nystagmus, eyes move conjugately. Smile and eyebrow raise equal, blinking symmetric, no weakness.  Nasolabial folds symmetric. Facial sensation (V1-3) equal, jaw opening strong. Tongue midline, good power. Shoulder shrug symmetric (pain in right shoulder due to old injury however), hearing intact to conversation.  Motor: no atrophy or fasciculations observed. Strength is 5/5 at bilateral shoulder abductors, elbow flex/ext, wrist ext, finger flex/ab/adductors, thumb opposition, hip flex, knee flex/ext, dorsi/plantar flexion. Finger tapping is equal frequency and amplitude bilaterally. Tone appears slightly increased R>L. No cogwheeling. Few beats of clonus in each ankle. Mild asterixis noted.  Reflexes: normoreflexic and symmetric at the brachioradialis/achilles. Biceps deferred due to lines, patellas with bilateral prior surgeries. No cross  Sensory: intact to light touch all four extremities.  Coordination: FNF no dysmetria, HTS intact.    History:  [facility]  Past Medical History:   Diagnosis Date     Allergic rhinitis due to other allergen       Arthritis      Depressive disorder, not elsewhere classified      Other and unspecified alcohol dependence, unspecified drinking behavior      Tobacco use disorder      Unspecified sleep apnea      PSH  Past Surgical History:   Procedure Laterality Date     ABDOMEN SURGERY  1998     APPENDECTOMY  1998     COLONOSCOPY  10/8/1998    Colonoscopy     COLONOSCOPY N/A 2019    Procedure: COLONOSCOPY;  Surgeon: René Luna MD;  Location: [facility]     ORTHOPEDIC SURGERY  2018     SOFT TISSUE SURGERY      left elbow     SURGICAL HISTORY OF -   1999    Uvulopalatopharyngoplasty with Tonsillecotomy     SURGICAL HISTORY OF -   2003    Septoplasty     SURGICAL HISTORY OF -       L Elbow     SURGICAL HISTORY OF -   2004     L Knee Arthroscopy     SURGICAL HISTORY OF -   10/2004    L Rotator Cuff Repair     SURGICAL HISTORY OF -       Ruptured appendix     Fam Hx  Family History   Problem Relation Age of Onset     Lipids Mother      Alzheimer Disease Mother               Anemia Mother         Questionable     Hyperlipidemia Mother      Cerebrovascular Disease Mother      Anxiety Disorder Mother      Unknown/Adopted Mother      Lipids Father      Cerebrovascular Disease Father          - mouth cancer     Cancer Father      Neurologic Disorder Brother         atacksia     Substance Abuse Brother         Ataxia,      Cerebrovascular Disease Brother         Ataxia, alchol abuse     Alcohol/Drug Brother         Alcohal and Rx abuse. Suicide 2007     Psychotic Disorder Brother      Thyroid Disease Brother      Cerebrovascular Disease Brother         suicide     Anxiety Disorder Brother      Substance Abuse Brother         suicide     Thyroid Disease Brother      Social Hx  Relationships   Social connections     Talks on phone: Not on file     Gets together: Not on file     Attends [employer] service: Not on file     Active member of club or organization: Not on file     Attends meetings of  clubs or organizations: Not on file     Relationship status: Not on file     History   Smoking Status     Light Tobacco Smoker     Packs/day: 0.50     Years: 25.00     Types: Cigarettes     Start date: 5/5/1980     Last attempt to quit: 9/1/2014   Smokeless Tobacco     Never Used     Comment: can't get my mind to agree with my feelings     Social History    Substance and Sexual Activity      Alcohol use: No        Investigations     Results for orders placed or performed during the hospital encounter of 07/19/20 (from the past 24 hour(s))   Blood gas arterial   Result Value Ref Range    pH Arterial 7.28 (L) 7.35 - 7.45 pH    pCO2 Arterial 42 35 - 45 mm Hg    pO2 Arterial 64 (L) 80 - 105 mm Hg    Bicarbonate Arterial 20 (L) 21 - 28 mmol/L    Base Deficit Art 6.8 mmol/L    FIO2 2L    Basic metabolic panel   Result Value Ref Range    Sodium 144 133 - 144 mmol/L    Potassium 4.1 3.4 - 5.3 mmol/L    Chloride 120 (H) 94 - 109 mmol/L    Carbon Dioxide 20 20 - 32 mmol/L    Anion Gap 4 3 - 14 mmol/L    Glucose 110 (H) 70 - 99 mg/dL    Urea Nitrogen 22 7 - 30 mg/dL    Creatinine 1.46 (H) 0.66 - 1.25 mg/dL    GFR Estimate 49 (L) >60 mL/min/[1.73_m2]    GFR Estimate If Black 56 (L) >60 mL/min/[1.73_m2]    Calcium 8.0 (L) 8.5 - 10.1 mg/dL   EKG 12-lead, complete   Result Value Ref Range    Interpretation ECG Click View Image link to view waveform and result    MR Brain w/o & w Contrast    Narrative    Brain MRI without and with contrast    History: AMS, diffuse myoclonus. Okay to wait until improvement in  renal function.    Comparison: CT head 7/18/2020, 7/12/2019    Technique: Axial FLAIR,  T1-weighted, and susceptibility images were  obtained without intravenous contrast. Following intravenous  gadolinium-based contrast administration, axial T2-weighted,  diffusion, FLAIR, and axial and coronal T1-weighted images were  obtained.     Dose: 7CC GADAVIST    Findings:   There is no mass effect, midline shift, or extra-axial  fluid  collection.  The ventricles are not enlarged out of proportion to the  cerebral sulci. The gray-white matter differentiation of the cerebral  hemispheres is preserved. Diffusion-weighted images reveal no abnormal  reduced diffusion. Susceptibility weighted imaging reveals no  intracranial hemorrhage. The patchy in the periventricular T2  hyperintense foci as well as T2 hyperintense signal within the christina,  which is nonspecific and may represent chronic small vessel ischemic  disease in a patient this age. Postcontrast images demonstrate no  abnormal intracranial parenchymal or meningeal enhancement.    The major vascular flow-voids appear patent. There is scattered  mucosal thickening throughout the ethmoid and sphenoid sinuses. The  maxillary and frontal sinuses are relatively clear. Mastoid air cells  are clear. Orbits are grossly unremarkable..      Impression    Impression:  1. No intracranial hemorrhage, midline shift, or hydrocephalus.   2. No abnormal contrast enhancing lesions intracranially.  3. Moderate Leukoaraiosis    I have personally reviewed the examination and initial interpretation  and I agree with the findings.    ALONSO COLÓN MD   Glucose by meter   Result Value Ref Range    Glucose 94 70 - 99 mg/dL   CBC with platelets   Result Value Ref Range    WBC 9.0 4.0 - 11.0 10e9/L    RBC Count 3.37 (L) 4.4 - 5.9 10e12/L    Hemoglobin 10.6 (L) 13.3 - 17.7 g/dL    Hematocrit 33.5 (L) 40.0 - 53.0 %    MCV 99 78 - 100 fl    MCH 31.5 26.5 - 33.0 pg    MCHC 31.6 31.5 - 36.5 g/dL    RDW 15.2 (H) 10.0 - 15.0 %    Platelet Count 200 150 - 450 10e9/L   Comprehensive metabolic panel   Result Value Ref Range    Sodium 142 133 - 144 mmol/L    Potassium 3.7 3.4 - 5.3 mmol/L    Chloride 114 (H) 94 - 109 mmol/L    Carbon Dioxide 22 20 - 32 mmol/L    Anion Gap 5 3 - 14 mmol/L    Glucose 93 70 - 99 mg/dL    Urea Nitrogen 13 7 - 30 mg/dL    Creatinine 1.06 0.66 - 1.25 mg/dL    GFR Estimate 72 >60  mL/min/[1.73_m2]    GFR Estimate If Black 83 >60 mL/min/[1.73_m2]    Calcium 8.6 8.5 - 10.1 mg/dL    Bilirubin Total 0.7 0.2 - 1.3 mg/dL    Albumin 2.9 (L) 3.4 - 5.0 g/dL    Protein Total 6.0 (L) 6.8 - 8.8 g/dL    Alkaline Phosphatase 60 40 - 150 U/L    ALT 17 0 - 70 U/L    AST 17 0 - 45 U/L   Echo Complete    Narrative    [id number]  [id number]  [id number]  [id number]^ELISE           [facility],[place]  Echocardiography Laboratory  [address] [id number]     Name: MYESHA QUIONNEZ  MRN: [id number]  : 1950  Study Date: 2020 07:41 AM  Age: 70 yrs  Gender: Male  Patient Location: [facility]  Reason For Study: Bradycardia - Sinus  Ordering Physician: ELISE CANALES  Referring Physician: FABIOLA NIELSEN  Performed By: Sriram Frank RDCS     BSA: 1.9 m2  Height: 70 in  Weight: 154 lb  HR: 40  BP: 117/62 mmHg  _____________________________________________________________________________  __        Procedure  Complete Portable Echo Adult.  _____________________________________________________________________________  __        Interpretation Summary  Global and regional left ventricular function is normal with an EF of 55-60%.  Right ventricular function, chamber size, wall motion, and thickness are  normal.  Sinuses of Valsalva 4 cm.  Dilation of the inferior vena cava is present with abnormal respiratory  variation in diameter.  No pericardial effusion is present.  Previous study not available for comparison.  _____________________________________________________________________________  __        Left Ventricle  Global and regional left ventricular function is normal with an EF of 55-60%.  Left ventricular wall thickness is normal. Left ventricular size is normal.  Left ventricular diastolic function is indeterminate. Diastolic Doppler  findings (E/E' ratio and/or other parameters) suggest left ventricular filling  pressures are normal. No regional wall motion  abnormalities are seen.     Right Ventricle  Right ventricular function, chamber size, wall motion, and thickness are  normal.     Atria  The right atria appears normal. Moderate left atrial enlargement is present.  The atrial septum is intact as assessed by color Doppler .     Mitral Valve  The mitral valve is normal.        Aortic Valve  The valve leaflets are not well visualized. On Doppler interrogation, there is  no significant stenosis or regurgitation.     Tricuspid Valve  The tricuspid valve is normal. Mild tricuspid insufficiency is present. The  right ventricular systolic pressure is approximated at 30.4 mmHg plus the  right atrial pressure.     Pulmonic Valve  The pulmonic valve is normal. Trace pulmonic insufficiency is present.     Vessels  The pulmonary artery and bifurcation cannot be assessed. Sinuses of Valsalva 4  cm. Dilation of the inferior vena cava is present with abnormal respiratory  variation in diameter.     Pericardium  No pericardial effusion is present.        Compared to Previous Study  Previous study not available for comparison.  _____________________________________________________________________________  __  MMode/2D Measurements & Calculations  IVSd: 0.88 cm     LVIDd: 5.5 cm  LVIDs: 3.7 cm  LVPWd: 1.0 cm  FS: 32.5 %  LV mass(C)d: 197.1 grams  LV mass(C)dI: 105.5 grams/m2  asc Aorta Diam: 3.3 cm  LVOT diam: 2.6 cm  LVOT area: 5.3 cm2  LA Volume Index (BP): 44.0 ml/m2  RWT: 0.37  TAPSE: 2.7 cm           Doppler Measurements & Calculations  MV E max ray: 65.4 cm/sec  MV A max ray: 36.3 cm/sec  MV E/A: 1.8  MV dec slope: 295.1 cm/sec2  MV dec time: 0.22 sec  TV max P.4 mmHg  PA acc time: 0.13 sec  TR max ray: 275.6 cm/sec  TR max P.4 mmHg  E/E' av.0  Lateral E/e': 6.7  Medial E/e': 7.2     _____________________________________________________________________________  __           Report approved by: Rishi Dubon 2020 09:30 AM         Med Rec Per Pharmacy: (see  note dated 7/19)  Prior to Admission medications    Medication Sig Last Dose Taking? Auth Provider   busPIRone (BUSPAR) 10 MG tablet Take 10 mg by mouth 3 times daily   Yes Unknown, Entered By History   escitalopram (LEXAPRO) 20 MG tablet Take 30 mg by mouth daily (takes 1.5 tablets)   Yes Unknown, Entered By History   fluticasone (FLONASE) 50 MCG/ACT nasal spray Spray 1-2 sprays into both nostrils daily   Yes Radha Vivar MD   gabapentin (NEURONTIN) 400 MG capsule Take 400 mg by mouth 3 times daily   Yes Unknown, Entered By History   hydrocodone-acetaminophen (NORCO)  MG per tablet Take 1-2 tablets by mouth every 3 hours as needed for moderate to severe pain Maximum 10 tablets per day   Yes Reported, Patient   hydrOXYzine (ATARAX) 25 MG tablet Take 50 mg by mouth 3 times daily as needed for anxiety    Yes Reported, Patient   LORazepam (ATIVAN) 1 MG tablet Take 0.5-1 tablets (0.5-1 mg) by mouth once as needed for muscle spasms Do not operate a vehicle after taking this medication.   Yes Jovany Ritter MD   morphine (MS CONTIN) 15 MG 12 hr tablet Take 3 tablets every morning, 3 tablets in the afternoon, and 2 tablets at bedtime   Yes Radha Vivar MD   potassium chloride ER (KLOR-CON M) 20 MEQ CR tablet Take 1 tablet (20 mEq) by mouth 2 times daily for 5 days   Yes Chetna Rabago PA-C   zolpidem (AMBIEN) 10 MG tablet Take 10 mg by mouth nightly as needed for sleep    Yes Reported, Patient   aspirin  MG EC tablet Take 325 mg by mouth 2 times daily (with meals)  Unknown at Unknown time   Reported, Patient   Cholecalciferol (VITAMIN D PO) Take 1 tablet by mouth daily Unknown at Unknown time   Reported, Patient   Cyanocobalamin (B-12 PO) Take 1 tablet by mouth daily Unknown at Unknown time   Reported, Patient   ferrous gluconate (FERGON) 324 (38 Fe) MG tablet Take 1 tablet (324 mg) by mouth daily (with breakfast) Unknown at Unknown time   Víctor Grant MD   Nutritional  Supplements (SENIOR MENS FORMULA OR) Take 1 tablet by mouth daily Reported on 4/26/2017 Unknown at Unknown time   Reported, Patient   varenicline (CHANTIX STARTING MONTH PAK) 0.5 MG X 11 & 1 MG X 42 tablet Take 0.5 mg tab daily for 3 days, THEN 0.5 mg tab twice daily for 4 days, THEN 1 mg twice daily. Unknown at Unknown time   Radha Vivar MD   Medication history completed by: Sharmin Wall, [facility]      Assessment and Plan:  Mr. Juan Lindsay is a 70 year old man with a history of ANGELA (not using Bipap at home per pt), depression, remote alcohol use disorder, ongoing tobacco use, chronic pain on chronic high dose opiates who presented to OSH 7/18 w/ staring spells, confusion, myoclonic jerks, and somnolence. Found to have altered mental status, myoclonic jerks, CLAIRE, respiratory acidosis. He was initially worked up for sepsis and given broad spectrum antibiotics including vancomycin. He was admitted to NCC/stroke and vEEG attached to look for evidence of seizures (no noted hx seizures or myoclonic epilepsy per pt and chart review).     Patient lives alone with wife and does not appear to be thriving at home. He is unsure what medications he takes and based on chart review of discussions with patient and wife, I have high suspicion that he is having difficulty with ADLs and medication management at home. Medication toxicity at home is possible. He is prescribed lexapro 30mg daily, however some concern from OSH notes that pt self-discontinued and not taking.     Suspect myoclonic jerks 2/2 gabapentin toxicity after CLAIRE and decreased renal clearance (vs opiate or other medication-induced myoclonus). He is clearing with improved kidney function. MRI and LP performed and not highly remarkable for other etiology.     Of note he has had episodes of asymptomatic sinus bradycardia while here. His heart rate is exercise responsive, but has been low to 30s-40s while at rest and talking.       #myoclonic jerks  EEG without  any evidence of seizure or epileptiform changes but consistent with encephalopathy. Most likely etiology is toxic/metabolic vs medication induced. High suspicion for gabapentin toxicity due to renal injury.   -ok to discontinue vEEG  -supportive cares for underlying medical conditions    #Sinus Bradycardia  Down to 30s. No symptoms endorsed during these episodes. EKG showed sinus bradycardia. Echo wnl. Avoiding beta blockers and medications with bradycardic effect.   - cards EP consult for any additional workup  -tele    #HTN  Elevated pressures to 180s systolic this am. With return to normal creatinine will restart low dose of home lisinopril.  -lisinopril 2.5mg PO daily    #respiratory acidosis, improving  Hx tobacco use, wheezing on lung exam. Improving resp acidosis. Still requiring more NC oxygen than baseline.   - Continuous oxygen monitoring  - O2 PRN  - BiPAP while sleeping  -avoid benzos    #CLAIRE  #elevated CK  Cr elevated to 3.23 on admit, downtrended and now wnl. CK elevated to 427 on admit but downtrended. Not a clear pre-renal pattern, however will continue fluid resuscitation for now. Concern for intrarenal etiology (vanc given early in course, other possible sources of ATN).   -  mL/hr  - Monitor with daily BMPs  - Electrolyte replacement available PRN    #chronic pain  #chronic opiate use  Home medications include norco  with 1-2 tabs [id number], morphine MS contin 15mg (3 tabs qam, 3tabs qafternoon, 2tabs at bedtime), as well as gabapentin 400mg TID. Will titrate up to home dose of pain medications slowly given recent metabolic insult. No medical indication to give prn opiates for opiate withdrawal-protocol typically includes adjuvant therapy such as clonidine and hydroxyzine. Cannot safely give clonidine given concern for bradycardia and fluctuations in HR. Will plan to avoid benzodiazepines due to concern for respiratory depression and central effects.   -hold gabapentin and norco due to  concern for AMS, depression of respirations, and induction of myoclonus  -morphine at decreased dose for now (30mg MS contin at morning and afternoon, 15mg MS contin qhs), will continue to evaluate  -prn atarax for anxiety relating to potential opiate withdrawal    #sleep  -hold pta zolpidem  -melatonin at bedtime prn (schedule at 7pm if giving)  -delirium precautions        Checklist:  PPx:   - DVT: subcutaneous Heparin and SCDs  - Diet: Regular diet  - Bowel regimen available PRN  - GI ppx not indicated  Code: Full      Patient was seen and discussed with Dr. Bello.    Leticia Gabriel MD  PGY2 Neurology  [phone]

## 2020-07-20 NOTE — PROGRESS NOTES
Preliminary Video EEG impression on July 19-20, 2020  Until 6am     This EEG is abnormal due to the presences of intermittent generalized polymorphic delta slowing in 50% of the record. There is a  7-8 hz symmetric parieto-occipital rhythm Sleep architecture (vertex waves, sleep spindles, POSTS, and a mixture of delta/theta slowing) was not present.     This EEG is consistent with a mild encephalopathy. No epileptiform discharges or electrographic seizures were seen in this record. If events of interest are noted, please press the event button and complete behavioral testing (ROAR testing) if possible. Clinical correlation is advised.     Carlene Trinidad MD  Staff Epilepsy Neurologist   [phone]

## 2020-07-20 NOTE — PLAN OF CARE
ICU End of Shift Summary. See flowsheets for vital signs and detailed assessment.    Changes this shift: Neuro unchanged. EEG d/c'd.  Intermittently disoriented to situation. Follows commands but is very anxious and restless, having chills and a runny nose; opiate withdrawal scale in use, no PRNs ordered, neurology team updated, ordered PRN norco and morphine x1 each. No PRN ordered still for opiate withdrawal . Per pt takes 10 tabs of vicodin and 3 times a day (3 in am, 2 in afternoon, and 3 in evening tabs). Afebrile, Demario to as low as 36bpm, team aware. SBP up to 190, PRN hydralazine administered x1. On 2-3L via NC. Regular diet with poor appetite. BM X3 today.     Plan: Cards EP consult, Lisinopril scheduled ordered. Echo done this am. Will continue to monitor and update team with changes.

## 2020-07-20 NOTE — PLAN OF CARE
Pt becoming increasingly agitated and harder to redirect. C/o sweats, HA, itchiness. Vital signs stable, temp 98.2. Sx notified for question of withdrawal d/t home opioid regime. Ativan ordered and given, will continue to monitor.

## 2020-07-20 NOTE — PLAN OF CARE
OT 4A: Evaluation complete and treatment initiated.  Discharge Planner OT   Patient plan for discharge: Home  Current status: Pt alert and interactive; however, confusion noted within conversation. Some difficulty reporting details, benefits from redirection.  Pt CGA for transfers and in-room ambulation.  HR hannah 38-50 throughout session and BP elevated 180s/80s - RN and MD aware  Barriers to return to prior living situation: impaired cognition, acute medical needs  Recommendations for discharge: anticipate with continued cognitive clearing, pt could return to home with assist of his wife, gary would recommend 24hr assist due to impaired safety awareness and cognitive processing - pt may benefit from home therapy pending progress to address safety in the home and proper medication management strategies.  Rationale for recommendations: see above.         Entered by: Katelin Dillon 07/20/2020 10:59 AM

## 2020-07-20 NOTE — PROGRESS NOTES
Preliminary Video EEG impression on July 19-20, 2020  Until EEG stopped   This EEG is abnormal due to the presences of intermittent generalized polymorphic delta slowing in 50% of the record. There is a  7-8 hz symmetric parieto-occipital rhythm Sleep architecture (vertex waves, sleep spindles, POSTS, and a mixture of delta/theta slowing) was not present.     This EEG is consistent with a mild encephalopathy. No epileptiform discharges or electrographic seizures were seen in this record. Clinical correlation is advised.     Carlene Trinidad MD  Staff Epilepsy Neurologist   [phone]

## 2020-07-20 NOTE — PLAN OF CARE
4A PT - Hold. PT evaluation orders acknowledged and appreciated. Per discussion with OT and chart review, pt is mobilizing SBA for in room mobility and anticipate 1 IP therapy discipline to meet pt's needs. OT to follow for functional mobility, cognition, and ADLs. PT to follow from a periphery and will initiate if needed.

## 2020-07-20 NOTE — PROGRESS NOTES
VEEG monitoring preliminary results:    VEEG has been running for over one hour.  EEG showed mild generalized slowing of the background activities.  No epileptiform activities, no clinical or electrographic seizures were observed.      Ene Anglin MD  Neurology

## 2020-07-21 ENCOUNTER — APPOINTMENT (OUTPATIENT)
Dept: OCCUPATIONAL THERAPY | Facility: CLINIC | Age: 70
DRG: 682 | End: 2020-07-21
Attending: PSYCHIATRY & NEUROLOGY
Payer: MEDICARE

## 2020-07-21 LAB
ANION GAP SERPL CALCULATED.3IONS-SCNC: 8 MMOL/L (ref 3–14)
BUN SERPL-MCNC: 8 MG/DL (ref 7–30)
CALCIUM SERPL-MCNC: 9.1 MG/DL (ref 8.5–10.1)
CHLORIDE SERPL-SCNC: 108 MMOL/L (ref 94–109)
CO2 SERPL-SCNC: 24 MMOL/L (ref 20–32)
CREAT SERPL-MCNC: 0.8 MG/DL (ref 0.66–1.25)
ERYTHROCYTE [DISTWIDTH] IN BLOOD BY AUTOMATED COUNT: 14.2 % (ref 10–15)
FERRITIN SERPL-MCNC: 227 NG/ML (ref 26–388)
GFR SERPL CREATININE-BSD FRML MDRD: >90 ML/MIN/{1.73_M2}
GLUCOSE BLDC GLUCOMTR-MCNC: 99 MG/DL (ref 70–99)
GLUCOSE SERPL-MCNC: 97 MG/DL (ref 70–99)
HCT VFR BLD AUTO: 36.4 % (ref 40–53)
HGB BLD-MCNC: 12.2 G/DL (ref 13.3–17.7)
MCH RBC QN AUTO: 31.4 PG (ref 26.5–33)
MCHC RBC AUTO-ENTMCNC: 33.5 G/DL (ref 31.5–36.5)
MCV RBC AUTO: 94 FL (ref 78–100)
PLATELET # BLD AUTO: 256 10E9/L (ref 150–450)
POTASSIUM SERPL-SCNC: 3.3 MMOL/L (ref 3.4–5.3)
POTASSIUM SERPL-SCNC: 3.5 MMOL/L (ref 3.4–5.3)
RBC # BLD AUTO: 3.89 10E12/L (ref 4.4–5.9)
SODIUM SERPL-SCNC: 139 MMOL/L (ref 133–144)
WBC # BLD AUTO: 10.2 10E9/L (ref 4–11)

## 2020-07-21 PROCEDURE — 99207 ZZC CONSULT E&M CHANGED TO SUBSEQUENT LEVEL: CPT | Performed by: NURSE PRACTITIONER

## 2020-07-21 PROCEDURE — 25000128 H RX IP 250 OP 636: Performed by: STUDENT IN AN ORGANIZED HEALTH CARE EDUCATION/TRAINING PROGRAM

## 2020-07-21 PROCEDURE — 25000132 ZZH RX MED GY IP 250 OP 250 PS 637: Mod: GY | Performed by: STUDENT IN AN ORGANIZED HEALTH CARE EDUCATION/TRAINING PROGRAM

## 2020-07-21 PROCEDURE — 36415 COLL VENOUS BLD VENIPUNCTURE: CPT | Performed by: PSYCHIATRY & NEUROLOGY

## 2020-07-21 PROCEDURE — 80048 BASIC METABOLIC PNL TOTAL CA: CPT | Performed by: PSYCHIATRY & NEUROLOGY

## 2020-07-21 PROCEDURE — 25000132 ZZH RX MED GY IP 250 OP 250 PS 637: Mod: GY | Performed by: PSYCHIATRY & NEUROLOGY

## 2020-07-21 PROCEDURE — 84132 ASSAY OF SERUM POTASSIUM: CPT | Performed by: PSYCHIATRY & NEUROLOGY

## 2020-07-21 PROCEDURE — 00000146 ZZHCL STATISTIC GLUCOSE BY METER IP

## 2020-07-21 PROCEDURE — 85027 COMPLETE CBC AUTOMATED: CPT | Performed by: PSYCHIATRY & NEUROLOGY

## 2020-07-21 PROCEDURE — 97535 SELF CARE MNGMENT TRAINING: CPT | Mod: GO | Performed by: OCCUPATIONAL THERAPIST

## 2020-07-21 PROCEDURE — 99233 SBSQ HOSP IP/OBS HIGH 50: CPT | Performed by: NURSE PRACTITIONER

## 2020-07-21 PROCEDURE — 12000004 ZZH R&B IMCU UMMC

## 2020-07-21 PROCEDURE — 82728 ASSAY OF FERRITIN: CPT | Performed by: STUDENT IN AN ORGANIZED HEALTH CARE EDUCATION/TRAINING PROGRAM

## 2020-07-21 RX ORDER — AMLODIPINE BESYLATE 5 MG/1
5 TABLET ORAL DAILY
Status: DISCONTINUED | OUTPATIENT
Start: 2020-07-21 | End: 2020-07-22 | Stop reason: HOSPADM

## 2020-07-21 RX ORDER — HYDRALAZINE HYDROCHLORIDE 20 MG/ML
10 INJECTION INTRAMUSCULAR; INTRAVENOUS EVERY 4 HOURS PRN
Status: DISCONTINUED | OUTPATIENT
Start: 2020-07-21 | End: 2020-07-22 | Stop reason: HOSPADM

## 2020-07-21 RX ORDER — HYDRALAZINE HYDROCHLORIDE 20 MG/ML
10 INJECTION INTRAMUSCULAR; INTRAVENOUS EVERY 4 HOURS PRN
Status: DISCONTINUED | OUTPATIENT
Start: 2020-07-21 | End: 2020-07-21

## 2020-07-21 RX ORDER — OXYCODONE HYDROCHLORIDE 5 MG/1
5-10 TABLET ORAL 3 TIMES DAILY PRN
Status: DISCONTINUED | OUTPATIENT
Start: 2020-07-21 | End: 2020-07-21

## 2020-07-21 RX ORDER — GABAPENTIN 100 MG/1
100 CAPSULE ORAL 2 TIMES DAILY
Status: DISCONTINUED | OUTPATIENT
Start: 2020-07-21 | End: 2020-07-21

## 2020-07-21 RX ORDER — LISINOPRIL 10 MG/1
10 TABLET ORAL DAILY
Status: DISCONTINUED | OUTPATIENT
Start: 2020-07-21 | End: 2020-07-22 | Stop reason: HOSPADM

## 2020-07-21 RX ORDER — MORPHINE SULFATE 30 MG/1
30 TABLET, FILM COATED, EXTENDED RELEASE ORAL AT BEDTIME
Status: DISCONTINUED | OUTPATIENT
Start: 2020-07-21 | End: 2020-07-22 | Stop reason: HOSPADM

## 2020-07-21 RX ORDER — HYDRALAZINE HYDROCHLORIDE 20 MG/ML
10 INJECTION INTRAMUSCULAR; INTRAVENOUS ONCE
Status: DISCONTINUED | OUTPATIENT
Start: 2020-07-21 | End: 2020-07-22 | Stop reason: HOSPADM

## 2020-07-21 RX ORDER — OXYCODONE HYDROCHLORIDE 5 MG/1
5 TABLET ORAL EVERY 4 HOURS PRN
Status: DISCONTINUED | OUTPATIENT
Start: 2020-07-21 | End: 2020-07-22 | Stop reason: HOSPADM

## 2020-07-21 RX ORDER — BUSPIRONE HYDROCHLORIDE 5 MG/1
10 TABLET ORAL 3 TIMES DAILY
Status: DISCONTINUED | OUTPATIENT
Start: 2020-07-21 | End: 2020-07-22 | Stop reason: HOSPADM

## 2020-07-21 RX ORDER — LANOLIN ALCOHOL/MO/W.PET/CERES
6 CREAM (GRAM) TOPICAL AT BEDTIME
Status: DISCONTINUED | OUTPATIENT
Start: 2020-07-21 | End: 2020-07-22 | Stop reason: HOSPADM

## 2020-07-21 RX ORDER — OXYCODONE HYDROCHLORIDE 5 MG/1
5 TABLET ORAL 3 TIMES DAILY PRN
Status: DISCONTINUED | OUTPATIENT
Start: 2020-07-21 | End: 2020-07-21

## 2020-07-21 RX ADMIN — HEPARIN SODIUM 5000 UNITS: 5000 INJECTION, SOLUTION INTRAVENOUS; SUBCUTANEOUS at 06:39

## 2020-07-21 RX ADMIN — HYDROXYZINE HYDROCHLORIDE 50 MG: 25 TABLET ORAL at 02:38

## 2020-07-21 RX ADMIN — FLUTICASONE PROPIONATE 1 SPRAY: 50 SPRAY, METERED NASAL at 08:10

## 2020-07-21 RX ADMIN — MORPHINE SULFATE 30 MG: 30 TABLET, EXTENDED RELEASE ORAL at 22:07

## 2020-07-21 RX ADMIN — OXYCODONE HYDROCHLORIDE 5 MG: 5 TABLET ORAL at 20:11

## 2020-07-21 RX ADMIN — GABAPENTIN 400 MG: 300 CAPSULE ORAL at 20:10

## 2020-07-21 RX ADMIN — POTASSIUM CHLORIDE 40 MEQ: 750 TABLET, EXTENDED RELEASE ORAL at 08:20

## 2020-07-21 RX ADMIN — MELATONIN TAB 3 MG 6 MG: 3 TAB at 19:05

## 2020-07-21 RX ADMIN — BUSPIRONE HYDROCHLORIDE 10 MG: 5 TABLET ORAL at 13:13

## 2020-07-21 RX ADMIN — OXYCODONE HYDROCHLORIDE 5 MG: 5 TABLET ORAL at 16:04

## 2020-07-21 RX ADMIN — ACETAMINOPHEN 650 MG: 325 TABLET, FILM COATED ORAL at 13:13

## 2020-07-21 RX ADMIN — HYDROXYZINE HYDROCHLORIDE 50 MG: 25 TABLET ORAL at 06:38

## 2020-07-21 RX ADMIN — BUSPIRONE HYDROCHLORIDE 10 MG: 5 TABLET ORAL at 20:10

## 2020-07-21 RX ADMIN — POTASSIUM CHLORIDE 20 MEQ: 750 TABLET, EXTENDED RELEASE ORAL at 16:04

## 2020-07-21 RX ADMIN — MORPHINE SULFATE 30 MG: 30 TABLET, EXTENDED RELEASE ORAL at 16:04

## 2020-07-21 RX ADMIN — ACETAMINOPHEN 650 MG: 325 TABLET, FILM COATED ORAL at 04:40

## 2020-07-21 RX ADMIN — NICOTINE 1 PATCH: 21 PATCH, EXTENDED RELEASE TRANSDERMAL at 08:17

## 2020-07-21 RX ADMIN — HEPARIN SODIUM 5000 UNITS: 5000 INJECTION, SOLUTION INTRAVENOUS; SUBCUTANEOUS at 13:15

## 2020-07-21 RX ADMIN — HEPARIN SODIUM 5000 UNITS: 5000 INJECTION, SOLUTION INTRAVENOUS; SUBCUTANEOUS at 22:07

## 2020-07-21 RX ADMIN — ACETAMINOPHEN 650 MG: 325 TABLET, FILM COATED ORAL at 17:54

## 2020-07-21 RX ADMIN — AMLODIPINE BESYLATE 5 MG: 5 TABLET ORAL at 14:25

## 2020-07-21 RX ADMIN — POTASSIUM CHLORIDE 20 MEQ: 750 TABLET, EXTENDED RELEASE ORAL at 10:16

## 2020-07-21 RX ADMIN — HYDRALAZINE HYDROCHLORIDE 10 MG: 20 INJECTION INTRAMUSCULAR; INTRAVENOUS at 13:08

## 2020-07-21 RX ADMIN — HYDROXYZINE HYDROCHLORIDE 50 MG: 25 TABLET ORAL at 10:26

## 2020-07-21 RX ADMIN — GABAPENTIN 100 MG: 100 CAPSULE ORAL at 10:16

## 2020-07-21 RX ADMIN — MORPHINE SULFATE 30 MG: 30 TABLET, EXTENDED RELEASE ORAL at 08:10

## 2020-07-21 RX ADMIN — HYDROXYZINE HYDROCHLORIDE 50 MG: 25 TABLET ORAL at 14:33

## 2020-07-21 RX ADMIN — LISINOPRIL 10 MG: 10 TABLET ORAL at 08:09

## 2020-07-21 ASSESSMENT — PAIN DESCRIPTION - DESCRIPTORS
DESCRIPTORS: ACHING;CRAMPING
DESCRIPTORS: ACHING
DESCRIPTORS: ACHING;SORE
DESCRIPTORS: STABBING
DESCRIPTORS: STABBING
DESCRIPTORS: ACHING

## 2020-07-21 ASSESSMENT — ACTIVITIES OF DAILY LIVING (ADL)
ADLS_ACUITY_SCORE: 13
ADLS_ACUITY_SCORE: 13
ADLS_ACUITY_SCORE: 11
ADLS_ACUITY_SCORE: 13
ADLS_ACUITY_SCORE: 11
ADLS_ACUITY_SCORE: 13

## 2020-07-21 ASSESSMENT — MIFFLIN-ST. JEOR: SCORE: 1461.25

## 2020-07-21 NOTE — PLAN OF CARE
Neuro: Able to answer orientation questions with some difficulty. Follows most commands. Speech clear, somewhat rambling. Other neuros negative.  Cardiac: SR  50-60s. BP elevated. Had pt lay flat, relax arm and slow breathing before measuring. Afebrile.   Respiratory: Sating 98 on RA. Lung sounds clear. Endorses runny nose.  GI/: Adequate urine output. Frequent urination. Passing large amounts of flatus. Small BMs, x2 this shift.   Diet/appetite: Tolerating reg diet. Poor appetite, reports he has not eaten in days.  Activity:  Assist of SBA, up to bathroom.  Pain: At acceptable level on current regimen. Reports aching discomfort in neck, gave tylenol.   Skin: No new deficits noted. Scattered bruising. Wound to L great toe, dressing CDI.  LDA's: PIV x1.    Plan: Gave hydoxyzine once for anxiety/restlessness and tremors. Continue with POC. Notify primary team with changes.

## 2020-07-21 NOTE — PLAN OF CARE
PT 6B: Defer -     Discharge Planner PT   Patient plan for discharge: Home  Current status: PT orders acknowledged and appreciated. Pt mobilizing with CGA and no balance impairments. Pt limited most by poor cognition. Pt without acute IP PT needs and remaining needs to be met by OT. Will complete orders. Thank you for your referral.   Barriers to return to prior living situation: medical status  Recommendations for discharge: Defer to OT  Rationale for recommendations: See above.        Entered by: Chetna Linares 07/21/2020 2:06 PM

## 2020-07-21 NOTE — CONSULTS
"Inpatient Pain Management Service: Consultation    ADMIT DATE: 7/19/2020  DATE OF CONSULT: July 21, 2020   CONSULT REASON: \"on large dose of chronic opiates for pain, presented with myoclonic spells and somnolence that improved w/ narcan\"  ORDERING PROVIDER:   Neurology Service (DENZEL Peralta MD)        CHIEF PAIN COMPLAINT:  Knees, shoulders, and back pain    ASSESSMENT:  70 year old male initally seen [facility] ED 7/16 for myoclonic jerks vs akathesia which resolved with electrolyte repletion and ativan, he was sent home, then presented 7/18 to [facility]SH ED with staring spells, confusion and myoclonic type jerking along with CLAIRE. Workup included 7/18 LP: CSF normal, CT Head: no mass, hemorrhage or stroke, chronic small vessel, MRI Brain: No intracranial hemorrhage, midline shift or hydrocephalus, no enhancing lesions, moderate leukoaraiosis  Creat up from 1.17 on 7/16 to 3.23 on 7/18, trending down today 0.8    ## CLAIRE, elevated CK trended to normal.  Concern for intrarenal etiology    ## Myoclonic jerks EEG: without evidence of seizure or epileptiform changes, but consistent with encephalopathy, most likely toxic/metabolic vs medication induced.     Doses that patient was taking of morphine with it's active metabolites M3G and M6G and gabapentin during CLAIRE likely contribute to myoclonus and altered mental status    ## Altered mental status.  Somnolence and confusion.  Somnolence improved following administration of naloxone at OSH.  Ongoing confusion, forgetfulness, OT cognitive screening today    ## History of chronic pain syndrome, musculoskeletal low back pain, failed R) TKA, on chronic high dose opioid therapy   -- Outpatient opioid requirements prior to admission: OME = 220mg/day   -- According to [place]  review for pain management.    Morphine controlled release (MS CONTIN) 15 mg tab last filled #240 tabs 7/16/2020. Take 3 tabs in am, 3 tabs afternoon, 2 tabs at bedtime, max 8 tabs/day    HYDROcodone  mg last filled " #300 tabs 6/9/2020. Take 1-2 tabs every 3 hrs PRN pain, max 10 tabs/day    Gabapentin (NEURONTIN) 400 mg capsule last filled #90 tabs 7/10/2020    ## History of alcohol use disorder, in remission for past 30 years    ## History of obstructive sleep apnea patient states was fixed with surgery.     ## Insomnia. RLS. Prescribed Zolpidem         Primary Care Provider: Radha Vivar  Chronic Pain Provider: Followed at [facility], Rupesh Roach MD      TREATMENT RECOMMENDATIONS/PLAN:   1. Continue MS Contin 30 mg PO every morning and afternoon and 15 mg at bedtime as ordered  2. Do not resume HYDROcodone-acetaminophen, taking combination medication for pain that contains acetaminophen may mask any fevers  3. Start oxycodone 5-10 mg PO Q 4 hrs PRN breakthrough pain.    4. Okay to resume gabapentin.  Start at 200 mg PO BID and uptitrate as tolerated to max dose of (PTA dose) 400 mg PO TID   5. Patient's outpatient pain specialist/clinic should be notified about reason for hospitalization change in opioid dosing, and should make an appointment to review medication plan as soon as it can be arranged  6. Patient and his wife need to be told and reminded that patient should remain on current long acting and short acting opioid analgesic dosing because patient is no longer tolerant to higher doses.  Any increase or combination with other CNS depressant could lead to life-threatening respiratory depression, or even death.  This was discussed with patient at time of visit, but he is very forgetful at this time.  7. Make sure to order Narcan nasal spray x 2 doses for opioid reversal    Pain Service will Sign Off at this time.     Recommendations were discussed with Neurology Service MODESTO Gabriel MD  Plan was reviewed by the Pain Service staff, MADISON Julien MD    Thank you for consulting the Inpatient Pain Management Service.   The above recommendations are to be acted upon at the primary team s discretion.    To reach us:  Mon  "- Friday 8 AM - 3 PM: Pager [phone] (Text Page)  After hours, weekends and holidays: Primary service should call [phone] for the on-call pain specialist    HISTORY OF PRESENT ILLNESS:   According to H&P \"Mr. Lindsay is a 71 yo M who presented to [facility] ED on 7/18 w staring spells, confusion, and myoclonic jerks. He was initially seen in the ED on 7/16 for myoclonic jerks v akathesia which resolved with electrolyte repletion and ativan and he was sent home. Then on AM of the 18th he woke up confused, very tired, and with returned myoclonus. They thought maybe it was due to his sleep medications, but it kept getting worse. He wasn't even able to hold a cup by afternoon due to the shaking so they went back in, at which point the repeated labs and he was sent here.     There was a report from OSH that he may have stopped taking his lexapro suddenly but he denies this and his S.O. isn't sure because he takes care of his own meds. He denies making any changes. While he normally seems to do okay, she does note that he doesn't have a good organizational system for them.\"    Patient currently endorses knee pain and back pain, and restless legs.  His average daily chronic pain 6-7/10 and today his pain is unchanged from baseline. Patient states he is responsible for taking his medications at home, \"I always take them as prescribed\".  Abram states that on Tuesday he had diarrhea and could not eat for 3-4 days then didn't know what happened and one morning his wife asked him what he was doing and he had put his pants on backwards. A few times during the visit he said that his wife Velvet is upset with him and he wishes there was a way for him to get out of here because she (Velvet) needs him to fix the printer.       FUNCTIONAL STATUS:  Change:      Improving since hospitalized  Oral intake:     Regular  Activity level:     Up with assistance ambulating in room, has bed alarm on  Sleep:      RLS affects " sleep  Mood:      Worried, forgetful      REVIEW OF 10 BODY SYSTEMS: 10 point ROS of systems including Constitutional, Eyes, Respiratory, Cardiovascular, Gastroenterology, Genitourinary, Integumentary, Musculoskeletal, Psychiatric were all negative except for pertinent positives noted in my HPI.       INPATIENT MEDICATIONS PERTINENT TO PAIN CONSULT:   Medications related to Pain Management (From now, onward)    Start     Dose/Rate Route Frequency Ordered Stop    07/21/20 1600  morphine (MS CONTIN) 12 hr tablet 30 mg      30 mg Oral DAILY 07/20/20 1957 07/21/20 1400  busPIRone (BUSPAR) tablet 10 mg      10 mg Oral 3 TIMES DAILY 07/21/20 1011 07/21/20 1015  gabapentin (NEURONTIN) capsule 100 mg      100 mg Oral 2 TIMES DAILY 07/21/20 1011 07/21/20 0800  morphine (MS CONTIN) 12 hr tablet 30 mg      30 mg Oral DAILY 07/20/20 1957 07/20/20 2200  morphine (MS CONTIN) 12 hr tablet 15 mg      15 mg Oral AT BEDTIME 07/20/20 1953 07/20/20 1858  hydrOXYzine (ATARAX) tablet 25-50 mg      25-50 mg Oral EVERY 4 HOURS PRN 07/20/20 1859      07/19/20 0152  acetaminophen (TYLENOL) tablet 650 mg      650 mg Oral EVERY 4 HOURS PRN 07/19/20 0152      07/19/20 0152  acetaminophen (TYLENOL) solution 650 mg      650 mg Per NG tube EVERY 4 HOURS PRN 07/19/20 0152              CURRENT MEDICATIONS:   Current Facility-Administered Medications Ordered in Epic   Medication Dose Route Frequency Provider Last Rate Last Dose     acetaminophen (TYLENOL) tablet 650 mg  650 mg Oral Q4H PRN Anay Martinez MD   650 mg at 07/21/20 0440    Or     acetaminophen (TYLENOL) solution 650 mg  650 mg Per NG tube Q4H PRN Anay Martinez MD         busPIRone (BUSPAR) tablet 10 mg  10 mg Oral TID Leticia Gabriel MD         fluticasone (FLONASE) 50 MCG/ACT spray 1 spray  1 spray Both Nostrils Daily Chriss Hernandez MD   1 spray at 07/21/20 0810     gabapentin (NEURONTIN) capsule 100 mg  100 mg Oral BID Leticia Gabriel  MD Xenia   100 mg at 07/21/20 1016     heparin ANTICOAGULANT injection 5,000 Units  5,000 Units Subcutaneous Q8H Anay Martinez MD   5,000 Units at 07/21/20 0639     hydrALAZINE (APRESOLINE) injection 10 mg  10 mg Intravenous Q6H PRN Ramandeep Peralta MD   10 mg at 07/20/20 2245     hydrOXYzine (ATARAX) tablet 25-50 mg  25-50 mg Oral Q4H PRN Ramandeep Peralta MD   50 mg at 07/21/20 0638     lisinopril (ZESTRIL) tablet 10 mg  10 mg Oral Daily Leticia Gabriel MD   10 mg at 07/21/20 0809     magnesium sulfate 2 g in water intermittent infusion  2 g Intravenous Daily PRN Anay Martinez  mL/hr at 07/19/20 1228 2 g at 07/19/20 1228     magnesium sulfate 4 g in 100 mL sterile water (premade)  4 g Intravenous Q4H PRN Anay Martinez MD         melatonin tablet 1 mg  1 mg Oral At Bedtime PRN Leticia Gabriel MD         morphine (MS CONTIN) 12 hr tablet 15 mg  15 mg Oral At Bedtime Leticia Gabriel MD   15 mg at 07/20/20 2204     morphine (MS CONTIN) 12 hr tablet 30 mg  30 mg Oral Daily Leticia Gabriel MD   30 mg at 07/21/20 0810     morphine (MS CONTIN) 12 hr tablet 30 mg  30 mg Oral Daily Leticia Gabriel MD         naloxone (NARCAN) injection 0.1-0.4 mg  0.1-0.4 mg Intravenous Q2 Min PRN Anay Martinez MD         nicotine (NICODERM CQ) 21 MG/24HR 24 hr patch 1 patch  1 patch Transdermal Daily Segundo Asutin MD   1 patch at 07/21/20 0817     nicotine Patch in Place   Transdermal Q8H Ne Dhillon MD         potassium chloride (KLOR-CON) Packet 20-40 mEq  20-40 mEq Oral or Feeding Tube Q2H PRN Anay Martinez MD         potassium chloride 10 mEq in 100 mL intermittent infusion with 10 mg lidocaine  10 mEq Intravenous Q1H PRN Anay Martinez MD         potassium chloride 10 mEq in 100 mL sterile water intermittent infusion (premix)  10 mEq Intravenous Q1H PRN Anay Martinez MD         potassium chloride 20 mEq in 50 mL intermittent infusion   20 mEq Intravenous Q1H PRN Anay Martinez MD         potassium chloride ER (KLOR-CON M) CR tablet 20-40 mEq  20-40 mEq Oral Q2H PRN Anay Martinez MD   20 mEq at 07/21/20 1016     sodium phosphate 10 mmol in D5W intermittent infusion  10 mmol Intravenous Daily PRN Anay Martinez MD         sodium phosphate 15 mmol in D5W intermittent infusion  15 mmol Intravenous Daily PRN Anay Martinez MD         sodium phosphate 20 mmol in D5W intermittent infusion  20 mmol Intravenous Q6H PRN Anay Martinez MD         sodium phosphate 25 mmol in D5W intermittent infusion  25 mmol Intravenous Q8H PRN Anay Martinez MD         No current [facility]-ordered outpatient medications on file.           HOME/PREVIOUS MEDICATIONS:   Prior to Admission medications    Medication Sig Start Date End Date Taking? Authorizing Provider   busPIRone (BUSPAR) 10 MG tablet Take 10 mg by mouth 3 times daily   Yes Unknown, Entered By History   escitalopram (LEXAPRO) 20 MG tablet Take 30 mg by mouth daily (takes 1.5 tablets)   Yes Unknown, Entered By History   fluticasone (FLONASE) 50 MCG/ACT nasal spray Spray 1-2 sprays into both nostrils daily 4/27/20  Yes Radha Vivar MD   gabapentin (NEURONTIN) 400 MG capsule Take 400 mg by mouth 3 times daily   Yes Unknown, Entered By History   hydrocodone-acetaminophen (NORCO)  MG per tablet Take 1-2 tablets by mouth every 3 hours as needed for moderate to severe pain Maximum 10 tablets per day   Yes Reported, Patient   hydrOXYzine (ATARAX) 25 MG tablet Take 50 mg by mouth 3 times daily as needed for anxiety  3/2/20  Yes Reported, Patient   LORazepam (ATIVAN) 1 MG tablet Take 0.5-1 tablets (0.5-1 mg) by mouth once as needed for muscle spasms Do not operate a vehicle after taking this medication. 7/16/20  Yes Jovany Ritter MD   morphine (MS CONTIN) 15 MG 12 hr tablet Take 3 tablets every morning, 3 tablets in the afternoon, and 2 tablets at bedtime 7/27/16  Yes Radha Vivar  MD GRACE   potassium chloride ER (KLOR-CON M) 20 MEQ CR tablet Take 1 tablet (20 mEq) by mouth 2 times daily for 5 days 7/16/20 7/21/20 Yes Chetna Rabago PA-C   zolpidem (AMBIEN) 10 MG tablet Take 10 mg by mouth nightly as needed for sleep  4/5/17  Yes Reported, Patient   aspirin  MG EC tablet Take 325 mg by mouth 2 times daily (with meals)  3/20/17   Reported, Patient   Cholecalciferol (VITAMIN D PO) Take 1 tablet by mouth daily    Reported, Patient   Cyanocobalamin (B-12 PO) Take 1 tablet by mouth daily    Reported, Patient   ferrous gluconate (FERGON) 324 (38 Fe) MG tablet Take 1 tablet (324 mg) by mouth daily (with breakfast) 8/8/18   Víctor Grant MD   Nutritional Supplements (SENIOR MENS FORMULA OR) Take 1 tablet by mouth daily Reported on 4/26/2017    Reported, Patient   varenicline (CHANTIX STARTING MONTH PAK) 0.5 MG X 11 & 1 MG X 42 tablet Take 0.5 mg tab daily for 3 days, THEN 0.5 mg tab twice daily for 4 days, THEN 1 mg twice daily. 5/13/20   Radha Vivar MD           PAST PAIN TREATMENT: [facility]        ALLERGIES:    Allergies   Allergen Reactions     Nka [No Known Allergies]             PAST MEDICAL AND PSYCHIATRIC HISTORY:    Past Medical History:   Diagnosis Date     Allergic rhinitis due to other allergen      Arthritis      Depressive disorder, not elsewhere classified      Other and unspecified alcohol dependence, unspecified drinking behavior      Tobacco use disorder      Unspecified sleep apnea            PAST SURGICAL HISTORY:   Past Surgical History:   Procedure Laterality Date     ABDOMEN SURGERY  1998     APPENDECTOMY  1998     COLONOSCOPY  10/8/1998    Colonoscopy     COLONOSCOPY N/A 9/5/2019    Procedure: COLONOSCOPY;  Surgeon: René Luna MD;  Location: [facility]     ORTHOPEDIC SURGERY  2018     SOFT TISSUE SURGERY  2005    left elbow     SURGICAL HISTORY OF -   7/12/1999    Uvulopalatopharyngoplasty with  "Tonsillecotomy     SURGICAL HISTORY OF -   9/2003    Septoplasty     SURGICAL HISTORY OF -   2002    L Elbow     SURGICAL HISTORY OF -   4/2004     L Knee Arthroscopy     SURGICAL HISTORY OF -   10/2004    L Rotator Cuff Repair     SURGICAL HISTORY OF -   19996    Ruptured appendix           FAMILY HISTORY: family history includes Alcohol/Drug in his brother; Alzheimer Disease in his mother; Anemia in his mother; Anxiety Disorder in his brother and mother; Cancer in his father; Cerebrovascular Disease in his brother, brother, father, and mother; Hyperlipidemia in his mother; Lipids in his father and mother; Neurologic Disorder in his brother; Psychotic Disorder in his brother; Substance Abuse in his brother and brother; Thyroid Disease in his brother and brother; Unknown/Adopted in his mother.      HEALTH & LIFESTYLE PRACTICES:   Tobacco:  reports that he has been smoking cigarettes. He started smoking about 40 years ago. He has a 12.50 pack-year smoking history. He has never used smokeless tobacco.  Alcohol:  reports no history of alcohol use.  Illicit drugs:  reports no history of drug use.      SOCIAL HISTORY:  Retired from [employer], worked in the lab.  Lives in a home [place] acres of land in [place] with wife Velvet.  Feels safe. Patient mentioned they both do own guns and \"we shoot together\"      LABORATORY VALUES:   Last Basic Metabolic Panel:  Lab Results   Component Value Date     07/21/2020      Lab Results   Component Value Date    POTASSIUM 3.3 07/21/2020     Lab Results   Component Value Date    CHLORIDE 108 07/21/2020     Lab Results   Component Value Date    MARY 9.1 07/21/2020     Lab Results   Component Value Date    CO2 24 07/21/2020     Lab Results   Component Value Date    BUN 8 07/21/2020     Lab Results   Component Value Date    CR 0.80 07/21/2020     Lab Results   Component Value Date    GLC 97 07/21/2020       CBC results:  Lab Results   Component Value Date    WBC 10.2 07/21/2020     Lab " Results   Component Value Date    HGB 12.2 07/21/2020     Lab Results   Component Value Date    HCT 36.4 07/21/2020     Lab Results   Component Value Date     07/21/2020       DIAGNOSTIC TESTS:   Labs above reviewed as well as additional relevant diagnostic studies from the EPIC record.       PHYSICAL EXAMINATION:  VITAL SIGNS:  B/P: 171/86, T: 98.1, P: 56, R: 18    Exam:  Constitutional: awake, sitting up in bed, no acute distress, restless  Head: normocephalic and atraumatic  ENT: lips and oral mucous membranes dry  Cardiovascular: extremities warm, well perfused, no edema  Respiratory: nonlabored breathing, no cough no wheeze  Musculoskeletal: extremities normal- no gross joint deformities noted, initially T5-6 spine tenderness to light tap, that was less tender when that part of exam was redone.    Skin: no suspicious lesions or rashes, hair quality, dry flaky scalp, bruise right antecubital  Neurologic: negative findings: muscle strength normal, positive findings: forgetful, mild asterixis. Unable to balance on toes, heels  Psychiatric: anxious, worried and forgetful, mild cognitive difficulty with articulating thoughts      TIME SPENT: 45 minutes including 20 minutes face-to-face time exam, evaluation and discussion of current treatment plan regarding chronic opioid therapy, and coordinating care with the primary team.      HESHAM Gutierrez CNP  July 21, 2020, 10:19 AM  Inpatient Pain Management Service

## 2020-07-21 NOTE — PROGRESS NOTES
"[facility]  General Neurology Progress Note    Patient Name:  Juan Lindsay  MRN:  [id number]    :  1950  Date of Service: 2020      Patient Summary:  Per HPI:  \"Mr. Lindsay is a 69 yo M who presented to OSH ED on  w staring spells, confusion, and myoclonic jerks. He was initially seen in the ED on  for myoclonic jerks v akathesia which resolved with electrolyte repletion and ativan and he was sent home. Then on AM of the  he woke up confused, very tired, and with returned myoclonus. They thought maybe it was due to his sleep medications, but it kept getting worse. He wasn't even able to hold a cup by afternoon due to the shaking so they went back in, at which point the repeated labs and he was sent here.     There was a report from OSH that he may have stopped taking his lexapro suddenly but he denies this and his S.O. isn't sure because he takes care of his own meds. He denies making any changes. While he normally seems to do okay, she does note that he doesn't have a good organizational system for them.\"      pt was transferred from stroke/critical care neurology service to general neurology service.       Interval history:   Since last exam Mr. Lindsay moved up to floor 6A. He was confused this morning, noting that he had \"gotten here yesterday\" to the hospital, and was unable to recall the events leading to his admission      Physical Examination   Vitals: BP (!) 171/86 (BP Location: Left arm)   Pulse 56   Temp 98.1  F (36.7  C) (Oral)   Resp 18   Ht 1.778 m (5' 10\")   Wt 68.5 kg (151 lb 0.2 oz)   SpO2 100%   BMI 21.67 kg/m    Physical Exam:  Constitutional: Alert. Sitting in bed. Some restlessness and movement of legs.   Head: Atraumatic, normocephalic.  ENT: Moist mucous membranes. No sinus drainage.  Cardiovascular: Appears warm, well-perfused, and non-toxic.   Respiratory: No increased work of breathing, no accessory muscle use. "   Gastrointestinal: Soft, nontender,nondistended  Musculoskeletal: Moving all four limbs spontaneously.   Skin: No rashes appreciated on visualized skin.   Hematologic/Lymphatic/Immunologic: Bruising on bilateral forearms at IV sites.   Neurologic:   Mental Status: alert, oriented to self, month/year. Oriented to hospital but thought he had gotten here yesterday and is onfused about some details of admission/course. he recalled 0/3 words correctly  Language: Speech is fluent, able to comprehend, and follows commands. Some paraphasic errors and word substitution. No dysarthria.  Cranial Nerves: pupils miotic, equal and reactive to light and accommodation, EOMI without nystagmus, eyes move conjugately. Smile and eyebrow raise equal, blinking symmetric, no weakness.  Nasolabial folds symmetric. hearing intact to conversation.  Motor: Strength is symmetric and intact.  Mild asterixis noted.  Reflexes: normoreflexic and symmetric at the brachioradialis/achilles. Biceps deferred due to lines, patellas with bilateral prior surgeries. No cross  Sensory: intact to light touch all four extremities.  Coordination: FNF no dysmetria        Investigations   Recent Labs   Lab 07/21/20  1419 07/21/20  0455 07/20/20  0821 07/19/20  1400  07/19/20  0302 07/18/20  2100 07/18/20  1739  07/16/20  1134   WBC  --  10.2 9.0  --   --  12.9*  --  18.1*  --  8.8   HGB  --  12.2* 10.6*  --   --  10.2*  --  12.9*  --  14.4   MCV  --  94 99  --   --  100  --  98  --  94   PLT  --  256 200  --   --  198  --  274  --  328   NA  --  139 142 144   < > 146*  --  141   < > 137   POTASSIUM 3.5 3.3* 3.7 4.1   < > 3.8  --  3.9   < > 2.9*   CHLORIDE  --  108 114* 120*   < > 119*  --  109   < > 105   CO2  --  24 22 20   < > 21  --  24   < > 25   BUN  --  8 13 22   < > 24  --  30   < > 11   CR  --  0.80 1.06 1.46*   < > 2.19*  --  3.23*   < > 1.17   ANIONGAP  --  8 5 4   < > 6  --  8   < > 7   MARY  --  9.1 8.6 8.0*   < > 8.0*  --  8.9   < > 9.3   GLC  --   97 93 110*   < > 87  --  102*   < > 112*   ALBUMIN  --   --  2.9*  --   --   --   --  3.9   < >  --    PROTTOTAL  --   --  6.0*  --   --   --   --  7.5   < >  --    BILITOTAL  --   --  0.7  --   --   --   --  0.5   < >  --    ALKPHOS  --   --  60  --   --   --   --  71   < >  --    ALT  --   --  17  --   --   --   --  18   < >  --    AST  --   --  17  --   --   --   --  23   < >  --    TROPI  --   --   --   --   --   --  0.027  --   --  <0.015    < > = values in this interval not displayed.         Assessment and Plan:  Mr. Juan Lindsay is a 70 year old man with a history of ANGELA (not using Bipap at home per pt), depression, remote alcohol use disorder, ongoing tobacco use, chronic pain on chronic high dose opiates who presented to OSH 7/18 w/ staring spells, confusion, myoclonic jerks, and somnolence. Found to have altered mental status, myoclonic jerks, CLAIRE, respiratory acidosis. He was initially worked up for sepsis and given broad spectrum antibiotics including vancomycin. He was admitted to NCC/stroke and vEEG attached to look for evidence of seizures (no noted hx seizures or myoclonic epilepsy per pt and chart review). No seizures seen, no epileptiform changes. Transferred to general neurology.     Suspect myoclonic jerks 2/2 gabapentin toxicity after CLAIRE and decreased renal clearance (vs opiate or other medication-induced myoclonus). He is clearing with improved kidney function. MRI and LP performed and not highly remarkable for other etiology.     Of note he has had episodes of asymptomatic sinus bradycardia while here. His heart rate is exercise responsive, but has been low to 30s-40s while at rest and talking.         #AMS  On exam Mr. Lindsay has limited recall and insight into his medical conditions. He is unable to tell me his exact medication regimen. Patient lives alone with wife. Per extensive conversation with her today she notes that he had been doing well, and was independent in all ADLs and iADLs,  however he did manage his own medications and didn't use a pill box, so she wondered if he sometimes missed/mixed up certain pills. So far he does not appear to be back at his baseline.   -PT and OT consulted, appreciate assistance with dispo plan    #chronic pain  #chronic opiate use  #restless leg syndrome  Home medications include norco  with 1-2 tabs [id number], morphine MS contin 15mg (3 tabs qam, 3tabs qafternoon, 2tabs at bedtime), as well as gabapentin 400mg TID. Will titrate up to home dose of pain medications slowly given recent metabolic insult. Concern from nursing for opiate withdrawal, symptoms (especially restlessness in bed) also likely due to patient's restless leg syndrome. Assisted walks encouraged. Pain consulted, appreciate recs. Patient was much more awake in afternoon and had symptoms of opiate withdrawal without depressed respirations. At this time it is reasonable to increase back up Increased home morphine back up to pta doses, and added an extra prn available of immediate release oxycodone.   -hold pta norco due to concern for AMS, depression of respirations, and induction of myoclonus  -morphine at decreased dose for now (30mg MS contin at morning and afternoon, 15mg MS contin qhs), will continue to evaluate  -added prn oxycodone (fewer metabolites than norco) as a q4hour prn for adjuvant pain control  -prn atarax for anxiety relating to potential opiate withdrawal  -resume pta buspar  -resume home gabapentin (400mg PO TID) as CLAIRE resolved, and due to escalating RLS symptoms    #myoclonic jerks, improving  EEG without any evidence of seizure or epileptiform changes but consistent with encephalopathy. Most likely etiology is toxic/metabolic vs medication induced. High suspicion for gabapentin toxicity due to renal injury.   -ok to discontinue vEEG  -supportive cares for underlying medical conditions    #Asymptomatic Sinus Bradycardia  Down to 30s. No symptoms endorsed during these  episodes. EKG showed sinus bradycardia. Echo wnl. Avoiding beta blockers and medications with bradycardic effect. Cards EP consulted and noted no additional workup needed at this time, appreciate assistance.   -if symptomatic please contact provider  -tele    #HTN  Elevated pressures to 180s systolic this am. With return to normal creatinine will restart low dose of home lisinopril.  -lisinopril 10mg PO daily    #respiratory acidosis, improving  Hx tobacco use, wheezing on lung exam. Improving resp acidosis. Still requiring more NC oxygen than baseline.   - Continuous oxygen monitoring  - O2 PRN  - BiPAP while sleeping  -avoid benzos    #CLAIRE  #elevated CK  Cr elevated to 3.23 on admit, downtrended and now wnl. CK elevated to 427 on admit but downtrended. Not a clear pre-renal pattern, however will continue fluid resuscitation for now. Concern for intrarenal etiology (vanc given early in course, other possible sources of ATN).   -  mL/hr  - Monitor with daily BMPs  - Electrolyte replacement available PRN    #sleep  -hold pta zolpidem  -scheduled melatonin 6mg at bedtime prn (schedule at 7pm)  -delirium precautions        Checklist:  PPx:   - DVT: subcutaneous Heparin and SCDs  - Diet: Regular diet  - Bowel regimen available PRN  - GI ppx not indicated  Code: Full      Patient was seen and discussed with Dr. Bello.    Leticia Gabriel MD  PGY2 Neurology  [phone]

## 2020-07-21 NOTE — CONSULTS
Electrophysiology Consultation Note   EP Attending: .   Reason for consultation: bradycardia, hypertension.   Provider requesting consultation: .  Date of Service: 7/21/2020      HPI:   Mr. Lindsay is a 70 year old male who has a past medical history significant for ANGELA, ETOH abuse, tobacco abuse, chronic high dose opiate use, and depression. He presented to OSH on 7/18/20 with staring spells, confusion, myoclonic jerks, and somnolence that improved with administration of narcan. He was admitted here for further evaluation. He was initially seen in ER on 7/16/20 for myoclonic jerks v akathesia which resolved with electrolyte repletion and ativan and he was sent home. Then on AM of the 18th he woke up confused, very tired, and with returned myoclonus. They thought maybe it was due to his sleep medications, but it kept getting worse. He wasn't even able to hold a cup by afternoon due to the shaking so they went back in, at which point the repeated labs and he was sent here. He is unable to provide much history or insight to his medical condition/symptoms. He is admitted to Neurology. While on monitor here, he has been having sinus bradycardia with HRs as low as mid 30s. ECG shows SB with no significant conduction abnormalities. He is asymptomatic. HR is responsive to exercise. He is also noted to be hypertensive. He initially had CLAIRE but renal function and electrolytes now WDL. No known prior cardiac history. Echo here showed LVEF 55-60%, normal RV size/function, and dilated IVC. Current cardiac medication: lisinopril.   Past Medical History:   Past Medical History:   Diagnosis Date     Allergic rhinitis due to other allergen      Arthritis      Depressive disorder, not elsewhere classified      Other and unspecified alcohol dependence, unspecified drinking behavior      Tobacco use disorder      Unspecified sleep apnea      Past Surgical History:   Past Surgical History:   Procedure Laterality  Date     ABDOMEN SURGERY  1998     APPENDECTOMY  1998     COLONOSCOPY  10/8/1998    Colonoscopy     COLONOSCOPY N/A 9/5/2019    Procedure: COLONOSCOPY;  Surgeon: René Luna MD;  Location: [facility]     ORTHOPEDIC SURGERY  2018     SOFT TISSUE SURGERY  2005    left elbow     SURGICAL HISTORY OF -   7/12/1999    Uvulopalatopharyngoplasty with Tonsillecotomy     SURGICAL HISTORY OF -   9/2003    Septoplasty     SURGICAL HISTORY OF -   2002    L Elbow     SURGICAL HISTORY OF -   4/2004     L Knee Arthroscopy     SURGICAL HISTORY OF -   10/2004    L Rotator Cuff Repair     SURGICAL HISTORY OF -   19996    Ruptured appendix     Allergies: Per MAR     Allergies   Allergen Reactions     Nka [No Known Allergies]      Medications:   Per MAR current outpatient cardiovascular medications include:   Medications Prior to Admission   Medication Sig Dispense Refill Last Dose     busPIRone (BUSPAR) 10 MG tablet Take 10 mg by mouth 3 times daily        escitalopram (LEXAPRO) 20 MG tablet Take 30 mg by mouth daily (takes 1.5 tablets)        fluticasone (FLONASE) 50 MCG/ACT nasal spray Spray 1-2 sprays into both nostrils daily 48 g 1      gabapentin (NEURONTIN) 400 MG capsule Take 400 mg by mouth 3 times daily        hydrocodone-acetaminophen (NORCO)  MG per tablet Take 1-2 tablets by mouth every 3 hours as needed for moderate to severe pain Maximum 10 tablets per day        hydrOXYzine (ATARAX) 25 MG tablet Take 50 mg by mouth 3 times daily as needed for anxiety         LORazepam (ATIVAN) 1 MG tablet Take 0.5-1 tablets (0.5-1 mg) by mouth once as needed for muscle spasms Do not operate a vehicle after taking this medication. 1 tablet 0      morphine (MS CONTIN) 15 MG 12 hr tablet Take 3 tablets every morning, 3 tablets in the afternoon, and 2 tablets at bedtime        potassium chloride ER (KLOR-CON M) 20 MEQ CR tablet Take 1 tablet (20 mEq) by mouth 2 times daily for 5 days 10 tablet 0      zolpidem (AMBIEN) 10 MG tablet Take  10 mg by mouth nightly as needed for sleep         aspirin  MG EC tablet Take 325 mg by mouth 2 times daily (with meals)    Unknown at Unknown time     Cholecalciferol (VITAMIN D PO) Take 1 tablet by mouth daily   Unknown at Unknown time     Cyanocobalamin (B-12 PO) Take 1 tablet by mouth daily   Unknown at Unknown time     ferrous gluconate (FERGON) 324 (38 Fe) MG tablet Take 1 tablet (324 mg) by mouth daily (with breakfast) 100 tablet 0 Unknown at Unknown time     Nutritional Supplements (SENIOR MENS FORMULA OR) Take 1 tablet by mouth daily Reported on 2017   Unknown at Unknown time     varenicline (CHANTIX STARTING MONTH ) 0.5 MG X 11 & 1 MG X 42 tablet Take 0.5 mg tab daily for 3 days, THEN 0.5 mg tab twice daily for 4 days, THEN 1 mg twice daily. 53 tablet 1 Unknown at Unknown time     No current outpatient medications on file.     Current Facility-Administered Medications   Medication Dose Route Frequency     fluticasone  1 spray Both Nostrils Daily     heparin ANTICOAGULANT  5,000 Units Subcutaneous Q8H     lisinopril  2.5 mg Oral Daily     morphine  15 mg Oral At Bedtime     morphine  30 mg Oral Daily     morphine  30 mg Oral Daily     nicotine  1 patch Transdermal Daily     nicotine   Transdermal Q8H     Family History:   Family History   Problem Relation Age of Onset     Lipids Mother      Alzheimer Disease Mother               Anemia Mother         Questionable     Hyperlipidemia Mother      Cerebrovascular Disease Mother      Anxiety Disorder Mother      Unknown/Adopted Mother      Lipids Father      Cerebrovascular Disease Father          - mouth cancer     Cancer Father      Neurologic Disorder Brother         atacksia     Substance Abuse Brother         Ataxia,      Cerebrovascular Disease Brother         Ataxia, alchol abuse     Alcohol/Drug Brother         Alcohal and Rx abuse. Suicide      Psychotic Disorder Brother      Thyroid Disease Brother      Cerebrovascular  "Disease Brother         suicide     Anxiety Disorder Brother      Substance Abuse Brother         suicide     Thyroid Disease Brother      Social History:   Social History     Tobacco Use     Smoking status: Light Tobacco Smoker     Packs/day: 0.50     Years: 25.00     Pack years: 12.50     Types: Cigarettes     Start date: 1980     Last attempt to quit: 2014     Years since quittin.8     Smokeless tobacco: Never Used     Tobacco comment: can't get my mind to agree with my feelings   Substance Use Topics     Alcohol use: No       ROS:   A comprehensive 10 point ROS was negative other than as mentioned in HPI.    Physical Examination:   VITALS: BP (!) 166/92   Pulse 56   Temp 98  F (36.7  C) (Oral)   Resp 18   Ht 1.778 m (5' 10\")   Wt 70.2 kg (154 lb 12.2 oz)   SpO2 100%   BMI 22.21 kg/m    GENERAL APPEARANCE: NAD, sitting in chair.   HEENT: NCAT, EOMI, MMM. PERRLA.   NECK: Supple.   CHEST: CTAB   CARDIOVASCULAR: S1S2, Reg, No m/r/g.   ABDOMEN: BS+, soft, NT, ND.   EXTREMITIES: No pedal edema. Distal pulses intact.   NEURO: Grossly nonfocal.   PSYCH: Normal affect.  SKIN: Warm and dry.   Data:   Labs:  BMP  Recent Labs   Lab 20  0455 20  0821 20  1400 20  1029    142 144 143   POTASSIUM 3.3* 3.7 4.1 3.8   CHLORIDE 108 114* 120* 118*   MARY 9.1 8.6 8.0* 8.2*   CO2 24 22 20 22   BUN 8 13 22 22   CR 0.80 1.06 1.46* 1.63*   GLC 97 93 110* 86     CBC  Recent Labs   Lab 20  0455 20  0821 20  0302 20  1739   WBC 10.2 9.0 12.9* 18.1*   RBC 3.89* 3.37* 3.33* 4.08*   HGB 12.2* 10.6* 10.2* 12.9*   HCT 36.4* 33.5* 33.4* 39.8*   MCV 94 99 100 98   MCH 31.4 31.5 30.6 31.6   MCHC 33.5 31.6 30.5* 32.4   RDW 14.2 15.2* 15.5* 15.2*    200 198 274     Cholesterol (mg/dL)   Date Value   2019 145   2018 179   2015 229 (H)   10/18/2011 222 (H)     Cholesterol/HDL Ratio (no units)   Date Value   2015 3.3   10/18/2011 4.0   2009 4.0 "   2007 3.0     HDL Cholesterol (mg/dL)   Date Value   2019 60   2018 70   2015 69   10/18/2011 52     LDL Cholesterol Calculated (mg/dL)   Date Value   2019 59   2018 88   2015 145 (H)   10/18/2011 149 (H)     LDL Cholesterol Direct (mg/dL)   Date Value   2013 128     EK/20/20 ECHO:   Interpretation Summary  Global and regional left ventricular function is normal with an EF of 55-60%.  Right ventricular function, chamber size, wall motion, and thickness are  normal.  Sinuses of Valsalva 4 cm.  Dilation of the inferior vena cava is present with abnormal respiratory  variation in diameter.  No pericardial effusion is present.  Previous study not available for comparison  Assessment:   Mr. Lindsay is a 70 year old male who has a past medical history significant for ANGELA, ETOH abuse, tobacco abuse, chronic high dose opiate use, and depression. He presented to OSH on 20 with staring spells, confusion, myoclonic jerks, and somnolence that improved with administration of narcan. He was admitted here for further evaluation. He was initially seen in ER on 20 for myoclonic jerks v akathesia which resolved with electrolyte repletion and ativan and he was sent home. Then on AM of the  he woke up confused, very tired, and with returned myoclonus. They thought maybe it was due to his sleep medications, but it kept getting worse. He wasn't even able to hold a cup by afternoon due to the shaking so they went back in, at which point the repeated labs and he was sent here. He is unable to provide much history or insight to his medical condition/symptoms. He is admitted to Neurology. While on monitor here, he has been having sinus bradycardia with HRs as low as mid 30s. ECG shows SB with no significant conduction abnormalities. He is asymptomatic. HR is responsive to exercise. He is also noted to be hypertensive. He initially had CLAIRE but renal function and  electrolytes now WDL. No known prior cardiac history. Echo here showed LVEF 55-60%, normal RV size/function, and dilated IVC. Current cardiac medication: lisinopril.   EP Recommendations:  He is having sinus bradycardia which is asymptomatic. HR is responsive to exercise. No further intervention/evaluations required for his bradycardia.   For his hypertension, recommend increasing Lisinopril to 10 mg daily with goal to increase to 20 mg if needed. BMP would be needed 1 week after dose increase.     The patient states understanding and is agreeable with plan.   Thank you for allowing us to participate in the care of this patient.     The patient was discussed w/ Dr. Castellon.  The above note reflects our joint plan.    HESHAM Jett CNP  Electrophysiology Consult Service  Pager: [phone]    EP STAFF NOTE  I have seen and examined the patient as part of a shared visit with SUNIL Jett NP.  I agree with the note above. I reviewed today's vital signs and medications. I have reviewed and discussed with the advanced practice provider their physical examination, assessment, and plan   Briefly, asymptomatic bradycardia  My key history/exam findings are: RRR.   The key management decisions made by me: no indication for PPM at this stage, BP control.    Hansel Castellon MD [facility]  Cardiology - Electrophysiology

## 2020-07-21 NOTE — PROGRESS NOTES
Transfer  Transferred from: 4A  Via:wheelchair  Reason for transfer:Pt appropriate for 6B- improved patient condition  Family: Aware of transfer  Belongings: Received with pt  Chart: Received with pt  Medications: Meds received from old unit with pt  Code Status verified on armband: yes  2 RN Skin Assessment Completed By: Sebastian EDWARDS  Bed surface reassessed with algorithm and charted: yes  New bed surface ordered: no    Report received from: Dalia HO  Pt status: Awake, conversational, some rambling speech, needed cues to answer orientation questions. BP elevated. Skin showed scattered bruising, small scabs on scalp and arms. Wound to L great toe, dressing CDI.

## 2020-07-21 NOTE — PLAN OF CARE
Temp: 98.1  F (36.7  C) Temp src: Oral BP: (!) 186/90 Pulse: 56 Heart Rate: 97 Resp: 18 SpO2: 97 % O2 Device: None (Room air)     Patient A&Ox4, extremely restless and agitated. Elevated BP- PRN hydralazine given x1 and MD notified; amlodipine started. Sinus hannah. On opioid withdrawal assessments, PRN atarax given x2. Up stand by with transfer belt, pt slightly unsteady while ambulating. PT and OT ordered. Frequently voiding and having loose bowel movements. Complaining of pain in bilateral shoulders, knees and lower back pain- scheduled MS contin given and PRN tylenol given x1

## 2020-07-21 NOTE — PLAN OF CARE
Discharge Planner OT   Patient plan for discharge: home  Current status: Pt ambulated hallway with CGA and no AE, appearing steady with balance challenges and stairs. HR 50s at rest, upper 60s with activity, BP up to 186/90 with activity. Administered MoCA cognitive screen with pt scoring 14/30 indicating moderate impairments (26 and above is WNL). Notable deficits with attention and short term memory. Pt has some insight into deficits.   Barriers to return to prior living situation: Decreased ADL independence and activity tolerance, cognition  Recommendations for discharge: home with assist - potential 24 hr assist and home OT. Will need assist for medication management and driving.   Rationale for recommendations: Depending on ability to perform functional tasks, pt may need more assist at home due to cognitive impairments, and home OT to assess home safety and ADL performance. Pt agreeable to have wife manage medications and complete all driving at this time.        Entered by: Huang Shukla 07/21/2020 11:57 AM

## 2020-07-21 NOTE — PROVIDER NOTIFICATION
Notified neurology - Re GB in 4226. Elevated BP, gave prn hydralazine at 2245. Order is only q6hr prn. BP continue to be elevated; 188/83 at 0000. Would you like to treat?  Lisa HO, [phone]

## 2020-07-21 NOTE — PROGRESS NOTES
Transferred to: Unit 6B Rm 36 bed 2 at (time)  Belongings: 2 gold rings, 1 watch, upper and lower dentures, clothing  Turner removed? Yes  Central line removed? Yes  Chart and medications sent with patient Yes  Family notified: No: To be notified in AM    Lisa Graves RN on 7/21/2020 at 3:28 AM

## 2020-07-22 VITALS
RESPIRATION RATE: 18 BRPM | BODY MASS INDEX: 20.93 KG/M2 | TEMPERATURE: 98.4 F | DIASTOLIC BLOOD PRESSURE: 90 MMHG | SYSTOLIC BLOOD PRESSURE: 164 MMHG | WEIGHT: 146.16 LBS | HEART RATE: 52 BPM | HEIGHT: 70 IN | OXYGEN SATURATION: 98 %

## 2020-07-22 LAB
ANION GAP SERPL CALCULATED.3IONS-SCNC: 9 MMOL/L (ref 3–14)
BUN SERPL-MCNC: 8 MG/DL (ref 7–30)
CALCIUM SERPL-MCNC: 9 MG/DL (ref 8.5–10.1)
CHLORIDE SERPL-SCNC: 108 MMOL/L (ref 94–109)
CO2 SERPL-SCNC: 22 MMOL/L (ref 20–32)
CREAT SERPL-MCNC: 0.93 MG/DL (ref 0.66–1.25)
GFR SERPL CREATININE-BSD FRML MDRD: 84 ML/MIN/{1.73_M2}
GLUCOSE SERPL-MCNC: 90 MG/DL (ref 70–99)
MAGNESIUM SERPL-MCNC: 1.9 MG/DL (ref 1.6–2.3)
PLATELET # BLD AUTO: 306 10E9/L (ref 150–450)
POTASSIUM SERPL-SCNC: 3.4 MMOL/L (ref 3.4–5.3)
SODIUM SERPL-SCNC: 139 MMOL/L (ref 133–144)

## 2020-07-22 PROCEDURE — 25000132 ZZH RX MED GY IP 250 OP 250 PS 637: Mod: GY | Performed by: STUDENT IN AN ORGANIZED HEALTH CARE EDUCATION/TRAINING PROGRAM

## 2020-07-22 PROCEDURE — 80048 BASIC METABOLIC PNL TOTAL CA: CPT | Performed by: PSYCHIATRY & NEUROLOGY

## 2020-07-22 PROCEDURE — 85049 AUTOMATED PLATELET COUNT: CPT | Performed by: PSYCHIATRY & NEUROLOGY

## 2020-07-22 PROCEDURE — 25000132 ZZH RX MED GY IP 250 OP 250 PS 637: Mod: GY | Performed by: PSYCHIATRY & NEUROLOGY

## 2020-07-22 PROCEDURE — 36415 COLL VENOUS BLD VENIPUNCTURE: CPT | Performed by: PSYCHIATRY & NEUROLOGY

## 2020-07-22 PROCEDURE — 83735 ASSAY OF MAGNESIUM: CPT | Performed by: PSYCHIATRY & NEUROLOGY

## 2020-07-22 RX ORDER — LANOLIN ALCOHOL/MO/W.PET/CERES
6 CREAM (GRAM) TOPICAL
Qty: 60 TABLET | Refills: 0 | Status: SHIPPED | OUTPATIENT
Start: 2020-07-22

## 2020-07-22 RX ORDER — LISINOPRIL 10 MG/1
10 TABLET ORAL DAILY
Qty: 60 TABLET | Refills: 0 | Status: SHIPPED | OUTPATIENT
Start: 2020-07-23

## 2020-07-22 RX ORDER — AMLODIPINE BESYLATE 5 MG/1
5 TABLET ORAL DAILY
Qty: 60 TABLET | Refills: 0 | Status: SHIPPED | OUTPATIENT
Start: 2020-07-23

## 2020-07-22 RX ADMIN — HYDROXYZINE HYDROCHLORIDE 50 MG: 25 TABLET ORAL at 03:11

## 2020-07-22 RX ADMIN — ACETAMINOPHEN 650 MG: 325 TABLET, FILM COATED ORAL at 03:11

## 2020-07-22 RX ADMIN — OXYCODONE HYDROCHLORIDE 5 MG: 5 TABLET ORAL at 06:46

## 2020-07-22 RX ADMIN — LISINOPRIL 10 MG: 10 TABLET ORAL at 07:46

## 2020-07-22 RX ADMIN — FLUTICASONE PROPIONATE 1 SPRAY: 50 SPRAY, METERED NASAL at 07:47

## 2020-07-22 RX ADMIN — GABAPENTIN 400 MG: 300 CAPSULE ORAL at 07:46

## 2020-07-22 RX ADMIN — NICOTINE 1 PATCH: 21 PATCH, EXTENDED RELEASE TRANSDERMAL at 08:36

## 2020-07-22 RX ADMIN — BUSPIRONE HYDROCHLORIDE 10 MG: 5 TABLET ORAL at 07:46

## 2020-07-22 RX ADMIN — OXYCODONE HYDROCHLORIDE 5 MG: 5 TABLET ORAL at 01:06

## 2020-07-22 RX ADMIN — POTASSIUM CHLORIDE 20 MEQ: 1.5 POWDER, FOR SOLUTION ORAL at 07:46

## 2020-07-22 RX ADMIN — AMLODIPINE BESYLATE 5 MG: 5 TABLET ORAL at 07:46

## 2020-07-22 RX ADMIN — MORPHINE SULFATE 45 MG: 30 TABLET, EXTENDED RELEASE ORAL at 07:46

## 2020-07-22 ASSESSMENT — MIFFLIN-ST. JEOR: SCORE: 1439.25

## 2020-07-22 ASSESSMENT — ACTIVITIES OF DAILY LIVING (ADL)
ADLS_ACUITY_SCORE: 13

## 2020-07-22 ASSESSMENT — PAIN DESCRIPTION - DESCRIPTORS: DESCRIPTORS: ACHING

## 2020-07-22 NOTE — PLAN OF CARE
Neuro: A&Ox4. Confused at times with rambling speech. Neuros intact. Agitated, uncomfortable, restless. Tremors, runny nose. New blurry vision this shift, unchanged since first assessed. Able to make needs known. COWS scores 13-17.  Cardiac: SR 60-70s when awake, SB 40s when sleeping. BP elevated 170-180s/100-110s.   Respiratory: Sating >98% on RA, SOB.  GI/: Frequency and urgency with little output. Bladder scanned for 218ml. BM x3.  Diet/appetite: Tolerating regular diet. Eating well.  Activity:  SBA, ambulated to bathroom.  Pain: Intolerable pain in back, knees and shoulder at beginning of shift. Neuro to bedside to assess, updated pain prn meds. Comfortably manageable on current regimen.  Skin: No new deficits noted.  LDA's:   -PIV SL  -R big toe wound: dressing changed 10pm    Plan: K replaced 20meq, recheck in AM. Continue with POC. Notify primary team with changes.

## 2020-07-22 NOTE — PROVIDER NOTIFICATION
"~4:00pm  Neuro notified: pt c/o blurry vision in both eyes and \"it doesn't feel right\", SOB, /139. MD to bedside to assess, changed prn pain med and hydralazine orders. Will continue to monitor.    9:01pm  Neuro notified: HR 46-50 intermittently from 60s. Pt alert, c/o dizziness and lightheadedness. /80. No new orders, will continue to closely monitor.   "

## 2020-07-22 NOTE — PLAN OF CARE
Occupational Therapy Discharge Summary    Reason for therapy discharge:    Discharged to home with home therapy.    Progress towards therapy goal(s). See [facility] on Care [facility] record for goal details.  Goals partially met.  Barriers to achieving goals:   discharge from facility.    Therapy recommendation(s):    Continued therapy is recommended.  Rationale/Recommendations:  Continue with home OT to assess home safety and ADL independence.

## 2020-07-22 NOTE — PLAN OF CARE
DISCHARGE                         No discharge date for patient encounter.  ----------------------------------------------------------------------------  Discharged to: Home  Via: private transportation  Accompanied by: Family  Discharge Instructions: regular diet, activity as tolerated, medications, follow up appointments, when to call the MD, aftercare instructions.  Prescriptions: To be filled by patients pharmacy; medication list reviewed & sent with pt, printed scripts   Follow Up Appointments: arranged; information given  Belongings: All sent with pt  IV: d/c'd  Telemetry: d/c'd  Pt exhibits understanding of above discharge instructions; all questions answered.    Discharge Paperwork: Signed, copied, and sent home with patient.

## 2020-07-22 NOTE — DISCHARGE SUMMARY
"[facility]  NEUROLOGY DISCHARGE SUMMARY    Patient Name:  Juan Lindsay  MRN:  [id number]      :  1950      Date of Admission:  2020  Date of Discharge::  2020  Admitting Physician:  Segundo Austin MD  Discharge Physician:  Collin Bello MD  Primary Care Provider:   Radha Vivar  Discharge Disposition:   Discharged to home    Admission Diagnoses:  Altered mental status  Acute kidney injury  Myoclonic episodes  Respiratory acidosis w secondary non-AG metabolic acidosis  Depression/anxiety  Chronic pain on opioids  ANGELA  Distant Hx EtOH abuse    Discharge Diagnoses:    Chronic opiate dependence   Chronic pain  ANGELA  RLS  CLAIRE  Depression/anxiety    Brief History of Illness:   Mr. Juan Lindsay is a 70 year old man with a history of ANGELA (not using Bipap at home per pt), depression, remote alcohol use disorder, ongoing tobacco use, chronic pain on chronic high dose opiates, osteopenia, history of long QT, knee replacement complicated by prostatic knee joint infections who presented to OSH  w/ sudden onset staring spells, confusion, myoclonic jerks, and somnolence.      Per his wife he had been feeling well and functioning at his normal level prior to this illness.  He describes some diarrheal prodromal type illness.  Then on  he began experiencing symptoms of \"heart pounding\" and \"twitching/shaking\" spells.  He presented to [facility] emergency department.  His EKG showed sinus bradycardia per report.  He a potassium low to 2.9.  With electrolyte repletion he felt better and he was discharged home.  He represented to the emergency department later the same day.  Per ED note he had no focal neurologic deficits at that time and his lab work was reassuring.  He was loaded with magnesium to help with the persistent twitching episodes.  He was discharged again from the emergency department at [facility].  He presented to [facility] " emergency department again on 7/18 with episodes of confusion staring spells and increasing myoclonic jerks.    Hospital Course:  Patient was found to have altered mental status, myoclonic jerks, CLAIRE, and respiratory acidosis.  He had very low blood pressures on presentation.  He was initially worked up for sepsis and given broad spectrum antibiotics. Creatinine on admission elevated significantly to 3.23 (baseline <1).  CK total was also elevated to 427, and cortisol elevated to 29.7.  CRP elevated to 74.8.  He had a respiratory acidosis on presentation with a compounding non-anion gap metabolic acidosis.  He was admitted to Neurocritical Care/stroke and vEEG attached to look for evidence of seizures (no noted hx seizures or myoclonic epilepsy per pt and chart review). No seizures seen, no epileptiform changes. MRI without acute remarkable changes. LP performed and unremarkable. Transferred to general neurology team on 7/20. As CLAIRE resolved, mental status improved and myoclonus resolved.  At this time it appears that the acute kidney injury caused by gabapentin (and potentially opiate or other medication) toxicity caused his symptoms.  From a neurology perspective gabapentin toxicity is highly likely to cause myoclonus.  The treatment for gabapentin-induced myoclonus is withdrawal of the offending agent.  Opiate medications are also known to cause a similar effect.  No further work-up indicated at this time from a neurologic perspective.    Unclear etiology of CLAIRE.  Per patient this is never happened to him before.  CLAIRE resolved with fluids and supportive cares.  Creatinine trended down to within normal range.  Recommend close follow-up with primary care provider to continue to assess kidney function.  Recommend repeat BMP within 1 week following discharge.      Mr. Giron was also noted to have hypertension to the 170s to 190s systolic while admitted.  He was also intermittently going to asymptomatic sinus  bradycardia.  Bradycardia was exercise responsive and was not symptomatic.  Electrophysiology cardiology was consulted recommended no additional testing at this time for sinus bradycardia.  Amlodipine was added for antihypertensive medication, as was lisinopril per cardiology recommendations once acute kidney injury had resolved.  PCP to follow-up with me in 1 week of discharge and follow-up on antihypertensive medications make changes as needed per kidney function.    Mr. Lindsay is on very high home doses of opiate and neuroactive medications including multiple serotonergic medications.  He is also on benzodiazepines and zolpidem. Home pain medications were reintroduced slowly and titrated up towards home doses as patient began displaying symptoms of withdrawing from opiates as he began to clear mentally and improved from a toxic metabolic standpoint.  Did not resume zolpidem for varenicline while inpatient.  Recommended the patient hold these medications on discharge until follow-up with providers and consider discontinuation if unnecessary for care as they can contribute to medication toxicity or polypharmacy.  Strongly recommended following up with outpatient pain providers in consideration of decreasing amounts of narcotics and other neuroactive medications as able.  Concern for acute kidney injury leading to toxicity, with concern of recurrent event in the future.     While in the hospital Mr. Giron endorsed symptoms consistent with restless leg syndrome.  This was improved with uptitrating gabapentin and opiate medications towards his home doses once he had cleared mentally and his kidney injury had improved.  Counseled on restless leg syndrome, and provided outpatient referral for sleep study.    I counseled patient and his wife at bedside extensively about the hospital course medication changes and follow-up plan.  They endorsed understanding and all questions were answered.  Mrs. Lindsay noted that they would  be calling PCP and pain clinic once arriving at home from the hospital to set up follow-up appointments within the next week.  Mr. Lindsay endorsed that he would not plan on driving, operating any heavy machinery, or participating in any other dangerous activities while on any narcotic medications.  Occupational therapy outpatient referral also provided.  OT saw patient while inpatient thought outpatient therapy may be appropriate PRN.  Given patient is a chronic pain patient with multiple opiate prescriptions, I additionally provided a prescription for naloxone nasal spray to treat opiate overdose.  Counseled patient and wife on when and how to use opiate reversal medication.      Pertinent Investigations:  Most Recent 3 CBC's:  Recent Labs   Lab Test 07/22/20  0449 07/21/20  0455 07/20/20  0821 07/19/20  0302   WBC  --  10.2 9.0 12.9*   HGB  --  12.2* 10.6* 10.2*   MCV  --  94 99 100    256 200 198      Most Recent 3 BMP's:  Recent Labs   Lab Test 07/22/20  0449 07/21/20  1419 07/21/20  0455 07/20/20  0821     --  139 142   POTASSIUM 3.4 3.5 3.3* 3.7   CHLORIDE 108  --  108 114*   CO2 22  --  24 22   BUN 8  --  8 13   CR 0.93  --  0.80 1.06   ANIONGAP 9  --  8 5   MARY 9.0  --  9.1 8.6   GLC 90  --  97 93     Most Recent 2 LFT's:  Recent Labs   Lab Test 07/20/20  0821 07/18/20  1739   AST 17 23   ALT 17 18   ALKPHOS 60 71   BILITOTAL 0.7 0.5     Most Recent INR's and Anticoagulation Dosing History:  Anticoagulation Dose History     Recent Dosing and Labs Latest Ref Rng & Units 9/18/2003    INR 0.86 - 1.14 0.97        Most Recent 3 Troponin's:  Recent Labs   Lab Test 07/18/20  2100 07/16/20  1134 07/12/19  0915   TROPI 0.027 <0.015 <0.015     Most Recent Cholesterol Panel:  Recent Labs   Lab Test 07/31/19  1007   CHOL 145   LDL 59   HDL 60   TRIG 132     Most Recent 6 Bacteria Isolates From Any Culture (See EPIC Reports for Culture Details):  Recent Labs   Lab Test 07/18/20 2040 07/18/20  1912  07/18/20  1850   CULT Culture negative monitoring continues No growth after 4 days No growth after 4 days     Most Recent TSH, T4 and A1c Labs:  Recent Labs   Lab Test 07/16/20  1134   TSH 0.62     Results for orders placed or performed during the hospital encounter of 07/19/20   MR Brain w/o & w Contrast    Narrative    Brain MRI without and with contrast    History: AMS, diffuse myoclonus. Okay to wait until improvement in  renal function.    Comparison: CT head 7/18/2020, 7/12/2019    Technique: Axial FLAIR,  T1-weighted, and susceptibility images were  obtained without intravenous contrast. Following intravenous  gadolinium-based contrast administration, axial T2-weighted,  diffusion, FLAIR, and axial and coronal T1-weighted images were  obtained.     Dose: 7CC GADAVIST    Findings:   There is no mass effect, midline shift, or extra-axial fluid  collection.  The ventricles are not enlarged out of proportion to the  cerebral sulci. The gray-white matter differentiation of the cerebral  hemispheres is preserved. Diffusion-weighted images reveal no abnormal  reduced diffusion. Susceptibility weighted imaging reveals no  intracranial hemorrhage. The patchy in the periventricular T2  hyperintense foci as well as T2 hyperintense signal within the christina,  which is nonspecific and may represent chronic small vessel ischemic  disease in a patient this age. Postcontrast images demonstrate no  abnormal intracranial parenchymal or meningeal enhancement.    The major vascular flow-voids appear patent. There is scattered  mucosal thickening throughout the ethmoid and sphenoid sinuses. The  maxillary and frontal sinuses are relatively clear. Mastoid air cells  are clear. Orbits are grossly unremarkable..      Impression    Impression:  1. No intracranial hemorrhage, midline shift, or hydrocephalus.   2. No abnormal contrast enhancing lesions intracranially.  3. Moderate Leukoaraiosis    I have personally reviewed the  examination and initial interpretation  and I agree with the findings.    ALONSO COLÓN MD   Echo Complete    Narrative    [id number]  [id number]  [id number]  [id number]^ALLY^ELISE           [facility],[place]  Echocardiography Laboratory  [address] [place] [id number]     Name: MYESHA QUINONEZ  MRN: [id number]  : 1950  Study Date: 2020 07:41 AM  Age: 70 yrs  Gender: Male  Patient Location: [facility]  Reason For Study: Bradycardia - Sinus  Ordering Physician: ELISE CANALES  Referring Physician: FABIOLA NIELSEN  Performed By: Sriram Frank RDCS     BSA: 1.9 m2  Height: 70 in  Weight: 154 lb  HR: 40  BP: 117/62 mmHg  _____________________________________________________________________________  __        Procedure  Complete Portable Echo Adult.  _____________________________________________________________________________  __        Interpretation Summary  Global and regional left ventricular function is normal with an EF of 55-60%.  Right ventricular function, chamber size, wall motion, and thickness are  normal.  Sinuses of Valsalva 4 cm.  Dilation of the inferior vena cava is present with abnormal respiratory  variation in diameter.  No pericardial effusion is present.  Previous study not available for comparison.  _____________________________________________________________________________  __        Left Ventricle  Global and regional left ventricular function is normal with an EF of 55-60%.  Left ventricular wall thickness is normal. Left ventricular size is normal.  Left ventricular diastolic function is indeterminate. Diastolic Doppler  findings (E/E' ratio and/or other parameters) suggest left ventricular filling  pressures are normal. No regional wall motion abnormalities are seen.     Right Ventricle  Right ventricular function, chamber size, wall motion, and thickness are  normal.     Atria  The right atria appears normal. Moderate left atrial  enlargement is present.  The atrial septum is intact as assessed by color Doppler .     Mitral Valve  The mitral valve is normal.        Aortic Valve  The valve leaflets are not well visualized. On Doppler interrogation, there is  no significant stenosis or regurgitation.     Tricuspid Valve  The tricuspid valve is normal. Mild tricuspid insufficiency is present. The  right ventricular systolic pressure is approximated at 30.4 mmHg plus the  right atrial pressure.     Pulmonic Valve  The pulmonic valve is normal. Trace pulmonic insufficiency is present.     Vessels  The pulmonary artery and bifurcation cannot be assessed. Sinuses of Valsalva 4  cm. Dilation of the inferior vena cava is present with abnormal respiratory  variation in diameter.     Pericardium  No pericardial effusion is present.        Compared to Previous Study  Previous study not available for comparison.  _____________________________________________________________________________  __  MMode/2D Measurements & Calculations  IVSd: 0.88 cm     LVIDd: 5.5 cm  LVIDs: 3.7 cm  LVPWd: 1.0 cm  FS: 32.5 %  LV mass(C)d: 197.1 grams  LV mass(C)dI: 105.5 grams/m2  asc Aorta Diam: 3.3 cm  LVOT diam: 2.6 cm  LVOT area: 5.3 cm2  LA Volume Index (BP): 44.0 ml/m2  RWT: 0.37  TAPSE: 2.7 cm           Doppler Measurements & Calculations  MV E max ray: 65.4 cm/sec  MV A max ray: 36.3 cm/sec  MV E/A: 1.8  MV dec slope: 295.1 cm/sec2  MV dec time: 0.22 sec  TV max P.4 mmHg  PA acc time: 0.13 sec  TR max ray: 275.6 cm/sec  TR max P.4 mmHg  E/E' av.0  Lateral E/e': 6.7  Medial E/e': 7.2     _____________________________________________________________________________  __           Report approved by: Rishi Dubon 2020 09:30 AM              Consultations:    Cardiology, pain service, wound nurse, physical therapy, Occupational Therapy    Discharge Medications:  Discharge Medication List as of 2020 10:45 AM      START taking these medications     Details   amLODIPine (NORVASC) 5 MG tablet Take 1 tablet (5 mg) by mouth daily, Disp-60 tablet,R-0, Local Print      lisinopril (ZESTRIL) 10 MG tablet Take 1 tablet (10 mg) by mouth daily, Disp-60 tablet,R-0, Local Print      naloxone (NARCAN) 4 MG/0.1ML nasal spray Spray 1 spray (4 mg) into one nostril alternating nostrils once as needed for opioid reversal every 2-3 minutes until assistance arrives, Disp-0.2 mL,R-0, Local Print         CONTINUE these medications which have NOT CHANGED    Details   aspirin  MG EC tablet Take 325 mg by mouth 2 times daily (with meals) , Historical      busPIRone (BUSPAR) 10 MG tablet Take 10 mg by mouth 3 times daily, Historical      Cholecalciferol (VITAMIN D PO) Take 1 tablet by mouth daily, Historical      Cyanocobalamin (B-12 PO) Take 1 tablet by mouth daily, Historical      escitalopram (LEXAPRO) 20 MG tablet Take 30 mg by mouth daily (takes 1.5 tablets), Historical      ferrous gluconate (FERGON) 324 (38 Fe) MG tablet Take 1 tablet (324 mg) by mouth daily (with breakfast), Disp-100 tablet, R-0, E-Prescribe      fluticasone (FLONASE) 50 MCG/ACT nasal spray Spray 1-2 sprays into both nostrils daily, Disp-48 g,R-1, E-Prescribe      gabapentin (NEURONTIN) 400 MG capsule Take 400 mg by mouth 3 times daily, Historical      hydrocodone-acetaminophen (NORCO)  MG per tablet Take 1-2 tablets by mouth every 3 hours as needed for moderate to severe pain Maximum 10 tablets per day, Historical      hydrOXYzine (ATARAX) 25 MG tablet Take 50 mg by mouth 3 times daily as needed for anxiety , Historical      LORazepam (ATIVAN) 1 MG tablet Take 0.5-1 tablets (0.5-1 mg) by mouth once as needed for muscle spasms Do not operate a vehicle after taking this medication., Disp-1 tablet,R-0, Local Print      melatonin 3 MG tablet Take 2 tablets (6 mg) by mouth nightly as needed for sleep, Disp-60 tablet,R-0, Local Print      morphine (MS CONTIN) 15 MG 12 hr tablet Take 3 tablets every  morning, 3 tablets in the afternoon, and 2 tablets at bedtime, Historical      Nutritional Supplements (SENIOR MENS FORMULA OR) Take 1 tablet by mouth daily Reported on 4/26/2017, Historical      zolpidem (AMBIEN) 10 MG tablet Take 10 mg by mouth nightly as needed for sleep , Historical         STOP taking these medications       potassium chloride ER (KLOR-CON M) 20 MEQ CR tablet Comments:   Reason for Stopping:         varenicline (CHANTIX STARTING MONTH PAK) 0.5 MG X 11 & 1 MG X 42 tablet Comments:   Reason for Stopping:                Basic metabolic panel     Occupational Therapy Referral      SLEEP EVALUATION & MANAGEMENT REFERRAL - [facility] [phone] (Age 15 and up)      Reason for your hospital stay    You were in the hospital because you had a decrease in kidney function that allowed medications to build up in your blood and cause confusion and muscle-jerking episodes called myoclonus. As your kidneys recovered, your symptoms improved. You should follow up closely with your Primary Care Provider to check on your kidney function, and to continue investigating why the kidney was injured in the first place. You had high blood pressures while you were in the hospital so 2 new blood pressure medications were started (Lisinopril and amlodipine). You should get a follow up lab within 1 week and also follow with your primary care doctor to track this. You also had a slow heart rate. This was evaluated by the cardiology team and they recommended you follow up in PCP clinic.     You are on high doses of many medications that can cause confusion or breathing problems. If these levels are too high in the blood you can have very serious side effects. We recommend seeing your pain clinic provider as soon as possible to help figure out what doses and medications will work best for you.     Adult [facility] Follow-up and recommended labs and tests    Follow up with primary care provider,  Radha Vivar, within 7 days to evaluate medication change and for hospital follow- up.  The following labs/tests are recommended: basic metabolic panel.      Follow up with your pain management clinic within 1-2 weeks.    Appointments on [place] and/or [facility] (with [facility] or [facility] provider or service). Call [phone] if you haven't heard regarding these appointments within 7 days of discharge.     Activity    Your activity upon discharge: activity as tolerated and no driving while using opiate//benzodiazepine medication     When to contact your care team    Call your primary doctor if you have any of the following: temperature greater than 100F, shortness of breath, vomiting that won't stop, new muscle jerking/twitching. For any concerning/life-threatening symptoms go to the Emergency Department. This would include increasing confusion or similar symptoms to the ones that brought you to the hospital this time.     Discharge Instructions    Take it easy and get plenty of rest. Stay well hydrated. Have a system set up to help you take your medications correctly each day.     Diet    Follow this diet upon discharge: Orders Placed This Encounter      Regular Diet Adult Thin Liquids (water, ice chips, juice, milk, gelatin, ice cream, etc)     Clinic/Infusion Appt Request    Please schedule follow up with PCP within 1 week of discharge       Medication changes:    -lisinopril 10mg PO daily  -amlodipine 5mg PO daily  -melatonin at night for sleep  -naloxone nasal spray for opiate overdose reversal (counseling provided)  -Held varenicline and zolpidem recommend holding on discharge    Discharge physical examination:   Vitals:  B/P: 164/90, T: 98.4, P: 52, R: 18  Physical Exam:  Constitutional: Alert. Sitting in bed. Some restlessness and movement of legs.   Head: Atraumatic, normocephalic.  ENT: Moist mucous membranes. No sinus drainage.  Cardiovascular: Appears warm, well-perfused, and non-toxic.    Respiratory: No increased work of breathing, no accessory muscle use.   Gastrointestinal: Soft, nontender,nondistended  Musculoskeletal: Moving all four limbs spontaneously.   Skin: No rashes appreciated on visualized skin.   Neurologic:   Mental Status: alert, oriented to self, month/year/place.  Language: Speech is fluent, able to comprehend, and follows commands. No dysarthria.  Cranial Nerves: pupils equal and reactive to light and accommodation, EOMI without nystagmus, eyes move conjugately. Smile and eyebrow raise equal, blinking symmetric, no weakness.  Nasolabial folds symmetric. hearing intact to conversation.  Motor: Strength is symmetric and intact.   Reflexes: normoreflexic and symmetric at the brachioradialis/biceps/achilles (patellas avoided due to knee surgeries and pain)  Sensory: intact to light touch all four extremities.  Coordination: FNF no dysmetria    Discharge follow up and instructions:    1.  Diet:   Regular  2.  Activity:  Activity as tolerated  3.  Restrictions:  No driving or operating machinery while on narcotic analgesics  4.  Follow up:  Follow up with primary care provider in 1 week, follow up with pain clinic in 1 week. Follow up with a sleep study for RLS.        A total of 60 minutes was spent on discharge activities.     Patient seen and discussed with Dr. Bello.    Leticia Gabriel MD  PGY2 Neurology

## 2020-07-22 NOTE — PLAN OF CARE
End of Shift Summary. See flowsheets for vital signs and detailed assessment.    Changes this shift:     Neuro: Alert and oriented x4, able to make needs known, call light appropriate. At beginning of shift, patient was uncooperative and resistant to cares; however, throughout the night, patient agreeable and participated in cares. Neuros intact (see flowsheet). Neuros Q4H, no changes.  COW scores 2-6. PRN meds given per indication (see MAR). This am, patient refused AM meds and expressed frustration due to being woken up. Emotional support provided with ongoing explanations. Patient remained dismissive.   Cardiac: Sinus rhythm-bradycardia, HR would drop to 40s when patient sleeping, asymptomatic. SBP WNL. Afebrile overnight.  Resp: RA, O2 stat >92%.   GI/: Tolerating oral intake. Up to bathroom with standby assist, voids spontaneously (see I&Os).     Plan: Continue to monitor hemodynamics and follow POC.

## 2020-07-23 ENCOUNTER — PATIENT OUTREACH (OUTPATIENT)
Dept: CARE COORDINATION | Facility: CLINIC | Age: 70
End: 2020-07-23

## 2020-07-23 LAB
B BURGDOR IGG CSF QL IB: NEGATIVE
B BURGDOR IGM CSF QL IB: NEGATIVE

## 2020-07-23 NOTE — PROGRESS NOTES
Clinic Care Coordination Contact  [facility]/Voicemail    Referral Source: IP Report    Clinical Data: Care Coordinator Outreach    Outreach attempted x 1.  Left message on patient's voicemail with call back information and requested return call.    Plan: Care Coordinator will send care coordination introduction letter with care coordinator contact information and explanation of care coordination services via [employer]. Care Coordinator will try to reach patient again in 1-2 business days.    GILBERT Boucher, RN   [facility]  - Clinic Care Coordinator  Phone: [phone]

## 2020-07-23 NOTE — LETTER
[place] CARE COORDINATION  [facility]  [address]  [place] [address]      July 23, 2020    Juangail Lindsay  [id number] [facility] [id number]      Dear Juan,    I am a clinic care coordinator who works with Radha Vivar MD at [facility]. I am reaching out to you to introduce Clinic Care Coordination which is a voluntary, additional source of support for our patients.  Below is a description of clinic care coordination and how we can further assist you.      The clinic care coordinator team is made up of a registered nurse,  and community health worker who understand the health care system. The goal of clinic care coordination is to help you manage your health by establishing patient-centered goals. The team can assist you in meeting your health care goals by providing education, strengthening the communication among your providers, and supporting you with any resource needs.    Please feel free to contact me at [phone] with any questions or if you are interested in enrollment. We are focused on providing you with the highest-quality healthcare experience possible and that all starts with you.     Sincerely,     Chelsi Green, PRATIBHAN, RN   [facility]  - [facility] Care Coordinator  Due to the current pandemic, I am working remotely. My direct number will ring to Lucky Pai. Please leave a brief message and I will return your call as quickly as possible.

## 2020-07-24 ENCOUNTER — TRANSFERRED RECORDS (OUTPATIENT)
Dept: HEALTH INFORMATION MANAGEMENT | Facility: CLINIC | Age: 70
End: 2020-07-24

## 2020-07-24 LAB
BACTERIA SPEC CULT: NO GROWTH
SPECIMEN SOURCE: NORMAL

## 2020-07-24 NOTE — PROGRESS NOTES
Clinic Care Coordination Contact  [facility]/Voicemail    Referral Source: IP Report  Patient was admitted at [facility] from 7/19 to 7/22 for diagnosis of encephalopathy and myoclonus due to opioid and gabapentin toxicity in the context of acute kidney injury, which resolved before discharge. Patient discharged home and was advised to follow up with his Pain provider and PCP.    Clinical Data: Care Coordinator Outreach    Outreach attempted x 2.  Left message on patient's voicemail with call back information and requested return call.    Plan: Care Coordinator sent care coordination introduction letter on 7/23/20 via [employer]. Care Coordinator will do no further outreaches at this time.    PRATIBHA BoucherN, RN   [facility]  - Clinic Care Coordinator  Phone: [phone]

## 2020-07-27 ENCOUNTER — TRANSFERRED RECORDS (OUTPATIENT)
Dept: HEALTH INFORMATION MANAGEMENT | Facility: CLINIC | Age: 70
End: 2020-07-27

## 2020-08-24 ENCOUNTER — TELEPHONE (OUTPATIENT)
Dept: FAMILY MEDICINE | Facility: CLINIC | Age: 70
End: 2020-08-24

## 2020-08-24 NOTE — TELEPHONE ENCOUNTER
Reason for Call:  Other / Spouse's request    Detailed comments:  Velvet calling wishing to speak with Dr. Vivar.  Abram passed away about 3 weeks ago and she'd like to speak to Dr. Vivar regarding his autopsy.  Thank you..Toya Hoyos    Phone Number Patient can be reached at: Other phone number:  [phone]*    Best Time: any time    Can we leave a detailed message on this number? YES    Call taken on 8/24/2020 at 9:46 AM by Toya Hoyos

## 2020-08-24 NOTE — TELEPHONE ENCOUNTER
I spoke with Velvet . She is trying to get a copy of the autopsy. Abram passed away at [facility] and I asked her to start with patient relations there and see if they could help her. If he had toxicology it may still not be completed. She is in agreement and will call back if she needs anything else. Radha Vivar M.D.

## 2021-01-10 ENCOUNTER — HEALTH MAINTENANCE LETTER (OUTPATIENT)
Age: 71
End: 2021-01-10

## 2021-10-23 ENCOUNTER — HEALTH MAINTENANCE LETTER (OUTPATIENT)
Age: 71
End: 2021-10-23

## 2022-02-12 ENCOUNTER — HEALTH MAINTENANCE LETTER (OUTPATIENT)
Age: 72
End: 2022-02-12

## 2022-10-10 ENCOUNTER — HEALTH MAINTENANCE LETTER (OUTPATIENT)
Age: 72
End: 2022-10-10

## 2023-02-14 NOTE — PROGRESS NOTES
"SUBJECTIVE:                                                    Juan Lindsay is a 66 year old male who presents to clinic today for the following health issues:    Joint Pain     Onset: 1 month ago patient slipped on ice hitting his shin stated it got better, then Friday he woke up and was not able to walk due to knee pain.  Hurts to put pressure on right knee    Description:   Location: right knee  Character: Dull ache all the time with Sharp and Stabbing pain that last for 10 seconds    Intensity: moderate    Progression of Symptoms: worse    Accompanying Signs & Symptoms:  Other symptoms: swelling   History:   Previous similar pain: YES- Had total knee replacement 11/8/2016       Precipitating factors:   Trauma or overuse: YES-       Alleviating factors:  Improved by: None       Therapies Tried and outcome: States that his pain medication do not help relieve pain, and ice makes it worst    He had fever 102 Monday, 101 Tuesday. Had 101 this morning. Took aspirin a few hours ago.   At first said no fever today  No cough, cold symptoms, always clears his throat, not feeling ill, no stomach issues, no sore throat or body aches    Did physical therapy for knee, finished this. Still had some knee pain  After falling on ice he had shin/knee pain for about a week  Then knee was back to baseline til the last few days. Hot, inflamed,    [facility] - morphine and norco for low back pain, work comp    Luis Mejia MD    [address]    [place] [address]    [phone]  #2  [phone] (Fax)       Problem list and histories reviewed & adjusted, as indicated.  Additional history: none    Labs reviewed in EPIC    ROS:  Other than noted above, general, HEENT, respiratory, cardiac and gastrointestinal systems are negative.     OBJECTIVE:                                                    /70  Pulse 76  Temp 98.4  F (36.9  C) (Tympanic)  Ht 5' 9.25\" (1.759 m)  Wt 189 lb 1.6 oz (85.8 kg)  BMI 27.72 " kg/m2 Body mass index is 27.72 kg/(m^2).   GENERAL: healthy, alert, well nourished, well hydrated, no distress  HENT: ear canals- normal; TMs- normal; Nose- normal; Mouth- no ulcers, no lesions  NECK: no tenderness, no adenopathy, no asymmetry, no masses, no stiffness; thyroid- normal to palpation  RESP: lungs clear to auscultation - no rales, no rhonchi, no wheezes  CV: regular rates and rhythm, normal S1 S2, no S3 or S4 and no murmur, no click or rub -  ABDOMEN: soft, no tenderness, no  hepatosplenomegaly, no masses, normal bowel sounds  MS: extremities- no gross deformities noted, no edema    Right Knee Exam: Inspection: Some scabbing on postop scar  Significant swelling anterior and proximal. Some warmth.  Faint erythema over whole knee. Some darker erythema surrounding scar  Tender: throughout knee  Active Range of Motion: limited       ASSESSMENT/PLAN:                                                      ASSESSMENT/PLAN:      ICD-10-CM    1. Right knee skin infection L08.9 CBC with platelets differential     Erythrocyte sedimentation rate auto   2. Status post total right knee replacement Z96.651      Concern for skin infection / joint infection; s/p right knee arthroplasty 11/2016.  Per patient report he has been febrile, significant swelling with overlying erythema, sed rate 56, WBCs not elevated.  I called patient's orthopedic office. They recommended appointment this afternoon.  Could see Silvestre HERRERA for Dr. Mejia) 1:40 at [facility]. Patient knows where this is and will head right down    Nguyen Lincoln PA-C   [facility]     Glycopyrrolate Counseling:  I discussed with the patient the risks of glycopyrrolate including but not limited to skin rash, drowsiness, dry mouth, difficulty urinating, and blurred vision.

## 2023-02-18 ENCOUNTER — HEALTH MAINTENANCE LETTER (OUTPATIENT)
Age: 73
End: 2023-02-18

## 2024-03-16 ENCOUNTER — HEALTH MAINTENANCE LETTER (OUTPATIENT)
Age: 74
End: 2024-03-16

## (undated) RX ORDER — GLYCOPYRROLATE 0.2 MG/ML
INJECTION, SOLUTION INTRAMUSCULAR; INTRAVENOUS
Status: DISPENSED
Start: 2019-09-05

## (undated) RX ORDER — ONDANSETRON 2 MG/ML
INJECTION INTRAMUSCULAR; INTRAVENOUS
Status: DISPENSED
Start: 2019-09-05

## (undated) RX ORDER — LIDOCAINE HYDROCHLORIDE 10 MG/ML
INJECTION, SOLUTION EPIDURAL; INFILTRATION; INTRACAUDAL; PERINEURAL
Status: DISPENSED
Start: 2020-07-18